# Patient Record
Sex: FEMALE | Race: WHITE | NOT HISPANIC OR LATINO | URBAN - METROPOLITAN AREA
[De-identification: names, ages, dates, MRNs, and addresses within clinical notes are randomized per-mention and may not be internally consistent; named-entity substitution may affect disease eponyms.]

---

## 2017-02-04 ENCOUNTER — INPATIENT (INPATIENT)
Facility: HOSPITAL | Age: 62
LOS: 5 days | Discharge: HOME | DRG: 536 | End: 2017-02-10
Attending: SURGERY | Admitting: SURGERY
Payer: COMMERCIAL

## 2017-02-04 VITALS — DIASTOLIC BLOOD PRESSURE: 73 MMHG | RESPIRATION RATE: 16 BRPM | HEART RATE: 90 BPM | SYSTOLIC BLOOD PRESSURE: 128 MMHG

## 2017-02-04 DIAGNOSIS — S32.501A UNSPECIFIED FRACTURE OF RIGHT PUBIS, INITIAL ENCOUNTER FOR CLOSED FRACTURE: ICD-10-CM

## 2017-02-04 LAB
ALBUMIN SERPL ELPH-MCNC: 3.8 G/DL — SIGNIFICANT CHANGE UP (ref 3.3–5)
ALP SERPL-CCNC: 88 U/L — SIGNIFICANT CHANGE UP (ref 40–120)
ALT FLD-CCNC: 24 U/L RC — SIGNIFICANT CHANGE UP (ref 10–45)
ANION GAP SERPL CALC-SCNC: 15 MMOL/L — SIGNIFICANT CHANGE UP (ref 5–17)
APPEARANCE UR: CLEAR — SIGNIFICANT CHANGE UP
APTT BLD: 29 SEC — SIGNIFICANT CHANGE UP (ref 27.5–37.4)
AST SERPL-CCNC: 28 U/L — SIGNIFICANT CHANGE UP (ref 10–40)
BACTERIA # UR AUTO: ABNORMAL /HPF
BASOPHILS # BLD AUTO: 0 K/UL — SIGNIFICANT CHANGE UP (ref 0–0.2)
BASOPHILS # BLD AUTO: 0.1 K/UL — SIGNIFICANT CHANGE UP (ref 0–0.2)
BASOPHILS NFR BLD AUTO: 0.2 % — SIGNIFICANT CHANGE UP (ref 0–2)
BASOPHILS NFR BLD AUTO: 0.5 % — SIGNIFICANT CHANGE UP (ref 0–2)
BILIRUB SERPL-MCNC: 0.4 MG/DL — SIGNIFICANT CHANGE UP (ref 0.2–1.2)
BILIRUB UR-MCNC: NEGATIVE — SIGNIFICANT CHANGE UP
BLD GP AB SCN SERPL QL: NEGATIVE — SIGNIFICANT CHANGE UP
BUN SERPL-MCNC: 19 MG/DL — SIGNIFICANT CHANGE UP (ref 7–23)
CALCIUM SERPL-MCNC: 9.3 MG/DL — SIGNIFICANT CHANGE UP (ref 8.4–10.5)
CHLORIDE SERPL-SCNC: 104 MMOL/L — SIGNIFICANT CHANGE UP (ref 96–108)
CO2 SERPL-SCNC: 23 MMOL/L — SIGNIFICANT CHANGE UP (ref 22–31)
COLOR SPEC: SIGNIFICANT CHANGE UP
CREAT SERPL-MCNC: 0.72 MG/DL — SIGNIFICANT CHANGE UP (ref 0.5–1.3)
DIFF PNL FLD: NEGATIVE — SIGNIFICANT CHANGE UP
EOSINOPHIL # BLD AUTO: 0 K/UL — SIGNIFICANT CHANGE UP (ref 0–0.5)
EOSINOPHIL # BLD AUTO: 0.1 K/UL — SIGNIFICANT CHANGE UP (ref 0–0.5)
EOSINOPHIL NFR BLD AUTO: 0.1 % — SIGNIFICANT CHANGE UP (ref 0–6)
EOSINOPHIL NFR BLD AUTO: 0.9 % — SIGNIFICANT CHANGE UP (ref 0–6)
EPI CELLS # UR: SIGNIFICANT CHANGE UP /HPF
GLUCOSE SERPL-MCNC: 115 MG/DL — HIGH (ref 70–99)
GLUCOSE UR QL: NEGATIVE — SIGNIFICANT CHANGE UP
HCT VFR BLD CALC: 40.2 % — SIGNIFICANT CHANGE UP (ref 34.5–45)
HCT VFR BLD CALC: 40.7 % — SIGNIFICANT CHANGE UP (ref 34.5–45)
HGB BLD-MCNC: 13.7 G/DL — SIGNIFICANT CHANGE UP (ref 11.5–15.5)
HGB BLD-MCNC: 13.8 G/DL — SIGNIFICANT CHANGE UP (ref 11.5–15.5)
INR BLD: 1.16 RATIO — SIGNIFICANT CHANGE UP (ref 0.88–1.16)
KETONES UR-MCNC: NEGATIVE — SIGNIFICANT CHANGE UP
LEUKOCYTE ESTERASE UR-ACNC: NEGATIVE — SIGNIFICANT CHANGE UP
LYMPHOCYTES # BLD AUTO: 15.5 % — SIGNIFICANT CHANGE UP (ref 13–44)
LYMPHOCYTES # BLD AUTO: 2 K/UL — SIGNIFICANT CHANGE UP (ref 1–3.3)
LYMPHOCYTES # BLD AUTO: 3.3 K/UL — SIGNIFICANT CHANGE UP (ref 1–3.3)
LYMPHOCYTES # BLD AUTO: 30.3 % — SIGNIFICANT CHANGE UP (ref 13–44)
MCHC RBC-ENTMCNC: 32.8 PG — SIGNIFICANT CHANGE UP (ref 27–34)
MCHC RBC-ENTMCNC: 32.8 PG — SIGNIFICANT CHANGE UP (ref 27–34)
MCHC RBC-ENTMCNC: 33.7 GM/DL — SIGNIFICANT CHANGE UP (ref 32–36)
MCHC RBC-ENTMCNC: 34.2 GM/DL — SIGNIFICANT CHANGE UP (ref 32–36)
MCV RBC AUTO: 95.8 FL — SIGNIFICANT CHANGE UP (ref 80–100)
MCV RBC AUTO: 97.3 FL — SIGNIFICANT CHANGE UP (ref 80–100)
MONOCYTES # BLD AUTO: 0.7 K/UL — SIGNIFICANT CHANGE UP (ref 0–0.9)
MONOCYTES # BLD AUTO: 0.7 K/UL — SIGNIFICANT CHANGE UP (ref 0–0.9)
MONOCYTES NFR BLD AUTO: 5.8 % — SIGNIFICANT CHANGE UP (ref 2–14)
MONOCYTES NFR BLD AUTO: 6.5 % — SIGNIFICANT CHANGE UP (ref 2–14)
NEUTROPHILS # BLD AUTO: 10 K/UL — HIGH (ref 1.8–7.4)
NEUTROPHILS # BLD AUTO: 6.8 K/UL — SIGNIFICANT CHANGE UP (ref 1.8–7.4)
NEUTROPHILS NFR BLD AUTO: 61.7 % — SIGNIFICANT CHANGE UP (ref 43–77)
NEUTROPHILS NFR BLD AUTO: 78.4 % — HIGH (ref 43–77)
NITRITE UR-MCNC: NEGATIVE — SIGNIFICANT CHANGE UP
PH UR: 6.5 — SIGNIFICANT CHANGE UP (ref 4.8–8)
PLATELET # BLD AUTO: 222 K/UL — SIGNIFICANT CHANGE UP (ref 150–400)
PLATELET # BLD AUTO: 263 K/UL — SIGNIFICANT CHANGE UP (ref 150–400)
POTASSIUM SERPL-MCNC: 4.3 MMOL/L — SIGNIFICANT CHANGE UP (ref 3.5–5.3)
POTASSIUM SERPL-SCNC: 4.3 MMOL/L — SIGNIFICANT CHANGE UP (ref 3.5–5.3)
PROT SERPL-MCNC: 6.6 G/DL — SIGNIFICANT CHANGE UP (ref 6–8.3)
PROT UR-MCNC: NEGATIVE — SIGNIFICANT CHANGE UP
PROTHROM AB SERPL-ACNC: 12.7 SEC — SIGNIFICANT CHANGE UP (ref 10–13.1)
RBC # BLD: 4.18 M/UL — SIGNIFICANT CHANGE UP (ref 3.8–5.2)
RBC # BLD: 4.2 M/UL — SIGNIFICANT CHANGE UP (ref 3.8–5.2)
RBC # FLD: 10.7 % — SIGNIFICANT CHANGE UP (ref 10.3–14.5)
RBC # FLD: 11.2 % — SIGNIFICANT CHANGE UP (ref 10.3–14.5)
RH IG SCN BLD-IMP: POSITIVE — SIGNIFICANT CHANGE UP
SODIUM SERPL-SCNC: 142 MMOL/L — SIGNIFICANT CHANGE UP (ref 135–145)
SP GR SPEC: 1.01 — SIGNIFICANT CHANGE UP (ref 1.01–1.02)
UROBILINOGEN FLD QL: NEGATIVE — SIGNIFICANT CHANGE UP
WBC # BLD: 10.9 K/UL — HIGH (ref 3.8–10.5)
WBC # BLD: 12.8 K/UL — HIGH (ref 3.8–10.5)
WBC # FLD AUTO: 10.9 K/UL — HIGH (ref 3.8–10.5)
WBC # FLD AUTO: 12.8 K/UL — HIGH (ref 3.8–10.5)
WBC UR QL: SIGNIFICANT CHANGE UP /HPF (ref 0–5)

## 2017-02-04 PROCEDURE — 73552 X-RAY EXAM OF FEMUR 2/>: CPT | Mod: 26,RT

## 2017-02-04 PROCEDURE — 71260 CT THORAX DX C+: CPT | Mod: 26

## 2017-02-04 PROCEDURE — 76377 3D RENDER W/INTRP POSTPROCES: CPT | Mod: 26

## 2017-02-04 PROCEDURE — 73502 X-RAY EXAM HIP UNI 2-3 VIEWS: CPT | Mod: 26,RT

## 2017-02-04 PROCEDURE — 71010: CPT | Mod: 26

## 2017-02-04 PROCEDURE — 99221 1ST HOSP IP/OBS SF/LOW 40: CPT | Mod: AI

## 2017-02-04 PROCEDURE — 72193 CT PELVIS W/DYE: CPT | Mod: 26,77

## 2017-02-04 PROCEDURE — 74177 CT ABD & PELVIS W/CONTRAST: CPT | Mod: 26,59

## 2017-02-04 PROCEDURE — 99285 EMERGENCY DEPT VISIT HI MDM: CPT

## 2017-02-04 PROCEDURE — 72193 CT PELVIS W/DYE: CPT | Mod: 26

## 2017-02-04 RX ORDER — SODIUM CHLORIDE 9 MG/ML
1000 INJECTION INTRAMUSCULAR; INTRAVENOUS; SUBCUTANEOUS ONCE
Qty: 0 | Refills: 0 | Status: COMPLETED | OUTPATIENT
Start: 2017-02-04 | End: 2017-02-04

## 2017-02-04 RX ORDER — ACETAMINOPHEN 500 MG
1000 TABLET ORAL ONCE
Qty: 0 | Refills: 0 | Status: COMPLETED | OUTPATIENT
Start: 2017-02-04 | End: 2017-02-04

## 2017-02-04 RX ORDER — SODIUM CHLORIDE 9 MG/ML
1000 INJECTION, SOLUTION INTRAVENOUS
Qty: 0 | Refills: 0 | Status: DISCONTINUED | OUTPATIENT
Start: 2017-02-04 | End: 2017-02-05

## 2017-02-04 RX ORDER — MORPHINE SULFATE 50 MG/1
4 CAPSULE, EXTENDED RELEASE ORAL ONCE
Qty: 0 | Refills: 0 | Status: DISCONTINUED | OUTPATIENT
Start: 2017-02-04 | End: 2017-02-04

## 2017-02-04 RX ORDER — OXYCODONE HYDROCHLORIDE 5 MG/1
5 TABLET ORAL EVERY 6 HOURS
Qty: 0 | Refills: 0 | Status: DISCONTINUED | OUTPATIENT
Start: 2017-02-04 | End: 2017-02-05

## 2017-02-04 RX ORDER — MORPHINE SULFATE 50 MG/1
2 CAPSULE, EXTENDED RELEASE ORAL ONCE
Qty: 0 | Refills: 0 | Status: DISCONTINUED | OUTPATIENT
Start: 2017-02-04 | End: 2017-02-04

## 2017-02-04 RX ORDER — ACETAMINOPHEN 500 MG
650 TABLET ORAL EVERY 6 HOURS
Qty: 0 | Refills: 0 | Status: DISCONTINUED | OUTPATIENT
Start: 2017-02-04 | End: 2017-02-05

## 2017-02-04 RX ADMIN — MORPHINE SULFATE 2 MILLIGRAM(S): 50 CAPSULE, EXTENDED RELEASE ORAL at 20:25

## 2017-02-04 RX ADMIN — MORPHINE SULFATE 4 MILLIGRAM(S): 50 CAPSULE, EXTENDED RELEASE ORAL at 23:37

## 2017-02-04 RX ADMIN — SODIUM CHLORIDE 1000 MILLILITER(S): 9 INJECTION INTRAMUSCULAR; INTRAVENOUS; SUBCUTANEOUS at 22:30

## 2017-02-04 RX ADMIN — Medication 1000 MILLIGRAM(S): at 18:53

## 2017-02-04 RX ADMIN — MORPHINE SULFATE 2 MILLIGRAM(S): 50 CAPSULE, EXTENDED RELEASE ORAL at 18:56

## 2017-02-04 RX ADMIN — MORPHINE SULFATE 2 MILLIGRAM(S): 50 CAPSULE, EXTENDED RELEASE ORAL at 21:00

## 2017-02-04 RX ADMIN — Medication 400 MILLIGRAM(S): at 17:55

## 2017-02-04 NOTE — ED PROVIDER NOTE - MUSCULOSKELETAL, MLM
No midline C, T, L ttp. No clavicular, chest wall, sternal ttp. Full ROM bilat UE with no joint or bony ttp. Full ROM LLE with no joint or bony ttp. RLE with limited R hip ROM 2/2 pain, but passive ROM intact, with R lateral hip ttp, no appreciable deformity. R knee with no ttp, full ROM, no ttp or deformity of ankle or foot.

## 2017-02-04 NOTE — H&P ADULT. - ATTENDING COMMENTS
Pt seen and examined. Agree with A/P. Admit to [facility], floor. NPO, IVF, serial HCT, pain control, hold lovenox.
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
## 2017-02-04 NOTE — ED ADULT NURSE REASSESSMENT NOTE - NS ED NURSE REASSESS COMMENT FT1
Patient complaining of pain at this time; MD dougherty made aware; will write for morphine; vitals stable; will continue to monitor; family at bedside
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
## 2017-02-04 NOTE — ED ADULT NURSE REASSESSMENT NOTE - NS ED NURSE REASSESS COMMENT FT1
0715 report received from aman jamison in [facility]; patient resting comfortably in stretcher; family at bedside; awaiting disposition; will reassess pain; a&ox3
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
## 2017-02-04 NOTE — ED ADULT NURSE REASSESSMENT NOTE - NS ED NURSE REASSESS COMMENT FT1
Assisted pt on female urinal. Hygienic and comfort needs provided. Tylenol IV administered as ordered, no adverse reaction noted.
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
## 2017-02-04 NOTE — ED PROCEDURE NOTE - PROCEDURE ADDITIONAL DETAILS
Emergency Department Focused Ultrasound performed at patient's bedside for educational purposes. The study will have a follow up study performed or was performed in the direct supervision of an ultrasound trained attending.       eFAST exam: no obvious intraperitoneal free fluid, lung sliding observed bilaterally. Confirmatory CT a/p and CXR pending
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
## 2017-02-04 NOTE — ED ADULT NURSE REASSESSMENT NOTE - NS ED NURSE REASSESS COMMENT FT1
Patient medicated as per order; patient awaiting transport upstairs; report called to shaheen brown 2monti

## 2017-02-04 NOTE — ED PROVIDER NOTE - PHYSICAL EXAMINATION
CN II-XII intact. Visual fields intact. EOMI, PERRLA. Facial sensation equal bilat. Smile and eye closure equal bilat. Hearing intact bilat. Palate elevation equal, tongue protrusion midline. Lateral head rotation equal bilat. 5/5 strength bilat UE and LLE. assessment RLE limited 2/2 pain. No pronator drift.  2+ rad and dp pulses bilat. sensation grossly intact bilat.
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
## 2017-02-05 LAB
ANION GAP SERPL CALC-SCNC: 10 MMOL/L — SIGNIFICANT CHANGE UP (ref 5–17)
APTT BLD: 29.2 SEC — SIGNIFICANT CHANGE UP (ref 27.5–37.4)
BUN SERPL-MCNC: 12 MG/DL — SIGNIFICANT CHANGE UP (ref 7–23)
CALCIUM SERPL-MCNC: 9.3 MG/DL — SIGNIFICANT CHANGE UP (ref 8.4–10.5)
CHLORIDE SERPL-SCNC: 104 MMOL/L — SIGNIFICANT CHANGE UP (ref 96–108)
CO2 SERPL-SCNC: 24 MMOL/L — SIGNIFICANT CHANGE UP (ref 22–31)
CREAT SERPL-MCNC: 0.74 MG/DL — SIGNIFICANT CHANGE UP (ref 0.5–1.3)
GLUCOSE SERPL-MCNC: 105 MG/DL — HIGH (ref 70–99)
HCT VFR BLD CALC: 38.2 % — SIGNIFICANT CHANGE UP (ref 34.5–45)
HGB BLD-MCNC: 12.4 G/DL — SIGNIFICANT CHANGE UP (ref 11.5–15.5)
INR BLD: 1.1 RATIO — SIGNIFICANT CHANGE UP (ref 0.88–1.16)
MCHC RBC-ENTMCNC: 31.9 PG — SIGNIFICANT CHANGE UP (ref 27–34)
MCHC RBC-ENTMCNC: 32.5 GM/DL — SIGNIFICANT CHANGE UP (ref 32–36)
MCV RBC AUTO: 98.2 FL — SIGNIFICANT CHANGE UP (ref 80–100)
PLATELET # BLD AUTO: 244 K/UL — SIGNIFICANT CHANGE UP (ref 150–400)
POTASSIUM SERPL-MCNC: 4.4 MMOL/L — SIGNIFICANT CHANGE UP (ref 3.5–5.3)
POTASSIUM SERPL-SCNC: 4.4 MMOL/L — SIGNIFICANT CHANGE UP (ref 3.5–5.3)
PROTHROM AB SERPL-ACNC: 12.4 SEC — SIGNIFICANT CHANGE UP (ref 10–13.1)
RBC # BLD: 3.89 M/UL — SIGNIFICANT CHANGE UP (ref 3.8–5.2)
RBC # FLD: 12.1 % — SIGNIFICANT CHANGE UP (ref 10.3–14.5)
SODIUM SERPL-SCNC: 138 MMOL/L — SIGNIFICANT CHANGE UP (ref 135–145)
WBC # BLD: 9.67 K/UL — SIGNIFICANT CHANGE UP (ref 3.8–10.5)
WBC # FLD AUTO: 9.67 K/UL — SIGNIFICANT CHANGE UP (ref 3.8–10.5)

## 2017-02-05 PROCEDURE — 99232 SBSQ HOSP IP/OBS MODERATE 35: CPT

## 2017-02-05 RX ORDER — OXYCODONE HYDROCHLORIDE 5 MG/1
10 TABLET ORAL EVERY 6 HOURS
Qty: 0 | Refills: 0 | Status: DISCONTINUED | OUTPATIENT
Start: 2017-02-05 | End: 2017-02-07

## 2017-02-05 RX ORDER — ACETAMINOPHEN 500 MG
1000 TABLET ORAL ONCE
Qty: 0 | Refills: 0 | Status: COMPLETED | OUTPATIENT
Start: 2017-02-05 | End: 2017-02-05

## 2017-02-05 RX ORDER — MORPHINE SULFATE 50 MG/1
2 CAPSULE, EXTENDED RELEASE ORAL
Qty: 0 | Refills: 0 | Status: DISCONTINUED | OUTPATIENT
Start: 2017-02-05 | End: 2017-02-07

## 2017-02-05 RX ORDER — DOCUSATE SODIUM 100 MG
100 CAPSULE ORAL THREE TIMES A DAY
Qty: 0 | Refills: 0 | Status: DISCONTINUED | OUTPATIENT
Start: 2017-02-05 | End: 2017-02-10

## 2017-02-05 RX ORDER — OXYCODONE HYDROCHLORIDE 5 MG/1
5 TABLET ORAL EVERY 6 HOURS
Qty: 0 | Refills: 0 | Status: DISCONTINUED | OUTPATIENT
Start: 2017-02-05 | End: 2017-02-06

## 2017-02-05 RX ORDER — SENNA PLUS 8.6 MG/1
2 TABLET ORAL AT BEDTIME
Qty: 0 | Refills: 0 | Status: DISCONTINUED | OUTPATIENT
Start: 2017-02-05 | End: 2017-02-10

## 2017-02-05 RX ORDER — ACETAMINOPHEN 500 MG
1000 TABLET ORAL ONCE
Qty: 0 | Refills: 0 | Status: COMPLETED | OUTPATIENT
Start: 2017-02-06 | End: 2017-02-06

## 2017-02-05 RX ORDER — ENOXAPARIN SODIUM 100 MG/ML
40 INJECTION SUBCUTANEOUS DAILY
Qty: 0 | Refills: 0 | Status: DISCONTINUED | OUTPATIENT
Start: 2017-02-05 | End: 2017-02-10

## 2017-02-05 RX ORDER — OXYCODONE HYDROCHLORIDE 5 MG/1
5 TABLET ORAL EVERY 6 HOURS
Qty: 0 | Refills: 0 | Status: DISCONTINUED | OUTPATIENT
Start: 2017-02-05 | End: 2017-02-05

## 2017-02-05 RX ADMIN — SODIUM CHLORIDE 30 MILLILITER(S): 9 INJECTION, SOLUTION INTRAVENOUS at 01:44

## 2017-02-05 RX ADMIN — Medication 400 MILLIGRAM(S): at 23:10

## 2017-02-05 RX ADMIN — Medication 1000 MILLIGRAM(S): at 19:30

## 2017-02-05 RX ADMIN — Medication 650 MILLIGRAM(S): at 01:44

## 2017-02-05 RX ADMIN — OXYCODONE HYDROCHLORIDE 5 MILLIGRAM(S): 5 TABLET ORAL at 12:11

## 2017-02-05 RX ADMIN — Medication 650 MILLIGRAM(S): at 12:11

## 2017-02-05 RX ADMIN — Medication 400 MILLIGRAM(S): at 19:15

## 2017-02-05 RX ADMIN — OXYCODONE HYDROCHLORIDE 5 MILLIGRAM(S): 5 TABLET ORAL at 12:41

## 2017-02-05 RX ADMIN — SENNA PLUS 2 TABLET(S): 8.6 TABLET ORAL at 23:10

## 2017-02-05 RX ADMIN — Medication 100 MILLIGRAM(S): at 23:10

## 2017-02-05 RX ADMIN — MORPHINE SULFATE 4 MILLIGRAM(S): 50 CAPSULE, EXTENDED RELEASE ORAL at 02:21

## 2017-02-05 RX ADMIN — Medication 1000 MILLIGRAM(S): at 23:25

## 2017-02-05 RX ADMIN — ENOXAPARIN SODIUM 40 MILLIGRAM(S): 100 INJECTION SUBCUTANEOUS at 12:12

## 2017-02-05 RX ADMIN — Medication 650 MILLIGRAM(S): at 06:20

## 2017-02-05 NOTE — PATIENT PROFILE ADULT. - VISION (WITH CORRECTIVE LENSES IF THE PATIENT USUALLY WEARS THEM):
Normal vision: sees adequately in most situations; can see medication labels, newsprint/prescription glasses

## 2017-02-06 PROCEDURE — 99232 SBSQ HOSP IP/OBS MODERATE 35: CPT

## 2017-02-06 RX ORDER — ACETAMINOPHEN 500 MG
1000 TABLET ORAL ONCE
Qty: 0 | Refills: 0 | Status: COMPLETED | OUTPATIENT
Start: 2017-02-06 | End: 2017-02-06

## 2017-02-06 RX ORDER — ONDANSETRON 8 MG/1
4 TABLET, FILM COATED ORAL EVERY 6 HOURS
Qty: 0 | Refills: 0 | Status: DISCONTINUED | OUTPATIENT
Start: 2017-02-06 | End: 2017-02-10

## 2017-02-06 RX ORDER — IBUPROFEN 200 MG
400 TABLET ORAL THREE TIMES A DAY
Qty: 0 | Refills: 0 | Status: DISCONTINUED | OUTPATIENT
Start: 2017-02-06 | End: 2017-02-10

## 2017-02-06 RX ORDER — ACETAMINOPHEN 500 MG
650 TABLET ORAL EVERY 6 HOURS
Qty: 0 | Refills: 0 | Status: DISCONTINUED | OUTPATIENT
Start: 2017-02-06 | End: 2017-02-10

## 2017-02-06 RX ORDER — OXYCODONE HYDROCHLORIDE 5 MG/1
5 TABLET ORAL EVERY 4 HOURS
Qty: 0 | Refills: 0 | Status: DISCONTINUED | OUTPATIENT
Start: 2017-02-06 | End: 2017-02-10

## 2017-02-06 RX ORDER — POLYETHYLENE GLYCOL 3350 17 G/17G
17 POWDER, FOR SOLUTION ORAL ONCE
Qty: 0 | Refills: 0 | Status: COMPLETED | OUTPATIENT
Start: 2017-02-06 | End: 2017-02-07

## 2017-02-06 RX ADMIN — Medication 400 MILLIGRAM(S): at 21:43

## 2017-02-06 RX ADMIN — MORPHINE SULFATE 2 MILLIGRAM(S): 50 CAPSULE, EXTENDED RELEASE ORAL at 10:51

## 2017-02-06 RX ADMIN — Medication 650 MILLIGRAM(S): at 17:38

## 2017-02-06 RX ADMIN — Medication 100 MILLIGRAM(S): at 12:39

## 2017-02-06 RX ADMIN — Medication 1000 MILLIGRAM(S): at 12:57

## 2017-02-06 RX ADMIN — OXYCODONE HYDROCHLORIDE 5 MILLIGRAM(S): 5 TABLET ORAL at 19:14

## 2017-02-06 RX ADMIN — Medication 1000 MILLIGRAM(S): at 06:36

## 2017-02-06 RX ADMIN — OXYCODONE HYDROCHLORIDE 5 MILLIGRAM(S): 5 TABLET ORAL at 18:44

## 2017-02-06 RX ADMIN — Medication 100 MILLIGRAM(S): at 21:12

## 2017-02-06 RX ADMIN — MORPHINE SULFATE 2 MILLIGRAM(S): 50 CAPSULE, EXTENDED RELEASE ORAL at 09:36

## 2017-02-06 RX ADMIN — Medication 100 MILLIGRAM(S): at 06:36

## 2017-02-06 RX ADMIN — Medication 400 MILLIGRAM(S): at 21:13

## 2017-02-06 RX ADMIN — Medication 650 MILLIGRAM(S): at 23:09

## 2017-02-06 RX ADMIN — SENNA PLUS 2 TABLET(S): 8.6 TABLET ORAL at 21:13

## 2017-02-06 RX ADMIN — ONDANSETRON 4 MILLIGRAM(S): 8 TABLET, FILM COATED ORAL at 17:08

## 2017-02-06 RX ADMIN — Medication 400 MILLIGRAM(S): at 12:42

## 2017-02-06 RX ADMIN — OXYCODONE HYDROCHLORIDE 10 MILLIGRAM(S): 5 TABLET ORAL at 23:39

## 2017-02-06 RX ADMIN — Medication 650 MILLIGRAM(S): at 17:08

## 2017-02-06 RX ADMIN — OXYCODONE HYDROCHLORIDE 10 MILLIGRAM(S): 5 TABLET ORAL at 23:09

## 2017-02-06 RX ADMIN — Medication 400 MILLIGRAM(S): at 06:35

## 2017-02-06 RX ADMIN — Medication 650 MILLIGRAM(S): at 23:39

## 2017-02-06 RX ADMIN — ENOXAPARIN SODIUM 40 MILLIGRAM(S): 100 INJECTION SUBCUTANEOUS at 12:39

## 2017-02-06 NOTE — PHYSICAL THERAPY INITIAL EVALUATION ADULT - DIAGNOSIS, PT EVAL
Decr. functional mobility due to decr. dynamic balance, postural control, R LE strength; gait dysfunction
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
## 2017-02-06 NOTE — PHYSICAL THERAPY INITIAL EVALUATION ADULT - ADDITIONAL COMMENTS
Pt lives with  in private home with 3-4 stairs to enter (B HR, spread apart), full flight to bedroom (1 HR), first floor setup available if necessary. Pt owns walker, w/c, rollator. Pt states  works during the day and is unable to provide pt with assist.

## 2017-02-06 NOTE — PHYSICAL THERAPY INITIAL EVALUATION ADULT - GENERAL OBSERVATIONS, REHAB EVAL
Pt received semi-supine in bed, (+) B venodyne sleeves, NAD. Pt's sister at bedside. Pt alert, c/o R hip pain, agreeable to PT eval.

## 2017-02-06 NOTE — PHYSICAL THERAPY INITIAL EVALUATION ADULT - GAIT DEVIATIONS NOTED, PT EVAL
decreased weight-shifting ability/decreased step length/decreased derek/increased time in double stance

## 2017-02-06 NOTE — PHYSICAL THERAPY INITIAL EVALUATION ADULT - PLANNED THERAPY INTERVENTIONS, PT EVAL
bed mobility training/balance training/postural re-education/transfer training/gait training/strengthening

## 2017-02-07 PROCEDURE — 99222 1ST HOSP IP/OBS MODERATE 55: CPT | Mod: GC

## 2017-02-07 RX ORDER — OXYCODONE HYDROCHLORIDE 5 MG/1
5 TABLET ORAL ONCE
Qty: 0 | Refills: 0 | Status: DISCONTINUED | OUTPATIENT
Start: 2017-02-07 | End: 2017-02-07

## 2017-02-07 RX ORDER — OXYCODONE HYDROCHLORIDE 5 MG/1
10 TABLET ORAL EVERY 4 HOURS
Qty: 0 | Refills: 0 | Status: DISCONTINUED | OUTPATIENT
Start: 2017-02-07 | End: 2017-02-10

## 2017-02-07 RX ORDER — TRAMADOL HYDROCHLORIDE 50 MG/1
25 TABLET ORAL EVERY 8 HOURS
Qty: 0 | Refills: 0 | Status: DISCONTINUED | OUTPATIENT
Start: 2017-02-07 | End: 2017-02-08

## 2017-02-07 RX ORDER — LIDOCAINE 4 G/100G
1 CREAM TOPICAL DAILY
Qty: 0 | Refills: 0 | Status: DISCONTINUED | OUTPATIENT
Start: 2017-02-07 | End: 2017-02-10

## 2017-02-07 RX ADMIN — Medication 650 MILLIGRAM(S): at 06:00

## 2017-02-07 RX ADMIN — Medication 400 MILLIGRAM(S): at 23:00

## 2017-02-07 RX ADMIN — ONDANSETRON 4 MILLIGRAM(S): 8 TABLET, FILM COATED ORAL at 15:35

## 2017-02-07 RX ADMIN — Medication 100 MILLIGRAM(S): at 05:25

## 2017-02-07 RX ADMIN — TRAMADOL HYDROCHLORIDE 25 MILLIGRAM(S): 50 TABLET ORAL at 19:00

## 2017-02-07 RX ADMIN — OXYCODONE HYDROCHLORIDE 10 MILLIGRAM(S): 5 TABLET ORAL at 20:40

## 2017-02-07 RX ADMIN — LIDOCAINE 1 PATCH: 4 CREAM TOPICAL at 15:34

## 2017-02-07 RX ADMIN — POLYETHYLENE GLYCOL 3350 17 GRAM(S): 17 POWDER, FOR SOLUTION ORAL at 05:25

## 2017-02-07 RX ADMIN — Medication 100 MILLIGRAM(S): at 13:48

## 2017-02-07 RX ADMIN — TRAMADOL HYDROCHLORIDE 25 MILLIGRAM(S): 50 TABLET ORAL at 18:42

## 2017-02-07 RX ADMIN — Medication 100 MILLIGRAM(S): at 22:27

## 2017-02-07 RX ADMIN — Medication 400 MILLIGRAM(S): at 05:25

## 2017-02-07 RX ADMIN — OXYCODONE HYDROCHLORIDE 5 MILLIGRAM(S): 5 TABLET ORAL at 08:35

## 2017-02-07 RX ADMIN — OXYCODONE HYDROCHLORIDE 10 MILLIGRAM(S): 5 TABLET ORAL at 20:19

## 2017-02-07 RX ADMIN — Medication 400 MILLIGRAM(S): at 06:00

## 2017-02-07 RX ADMIN — Medication 400 MILLIGRAM(S): at 13:44

## 2017-02-07 RX ADMIN — Medication 400 MILLIGRAM(S): at 22:27

## 2017-02-07 RX ADMIN — Medication 650 MILLIGRAM(S): at 05:25

## 2017-02-07 RX ADMIN — OXYCODONE HYDROCHLORIDE 5 MILLIGRAM(S): 5 TABLET ORAL at 08:05

## 2017-02-07 RX ADMIN — ENOXAPARIN SODIUM 40 MILLIGRAM(S): 100 INJECTION SUBCUTANEOUS at 13:47

## 2017-02-07 RX ADMIN — OXYCODONE HYDROCHLORIDE 5 MILLIGRAM(S): 5 TABLET ORAL at 09:25

## 2017-02-07 RX ADMIN — SENNA PLUS 2 TABLET(S): 8.6 TABLET ORAL at 22:27

## 2017-02-07 RX ADMIN — Medication 400 MILLIGRAM(S): at 14:14

## 2017-02-07 RX ADMIN — OXYCODONE HYDROCHLORIDE 5 MILLIGRAM(S): 5 TABLET ORAL at 08:55

## 2017-02-08 RX ORDER — POLYETHYLENE GLYCOL 3350 17 G/17G
17 POWDER, FOR SOLUTION ORAL DAILY
Qty: 0 | Refills: 0 | Status: DISCONTINUED | OUTPATIENT
Start: 2017-02-08 | End: 2017-02-10

## 2017-02-08 RX ORDER — TRAMADOL HYDROCHLORIDE 50 MG/1
50 TABLET ORAL EVERY 8 HOURS
Qty: 0 | Refills: 0 | Status: DISCONTINUED | OUTPATIENT
Start: 2017-02-08 | End: 2017-02-10

## 2017-02-08 RX ADMIN — LIDOCAINE 1 PATCH: 4 CREAM TOPICAL at 13:31

## 2017-02-08 RX ADMIN — Medication 400 MILLIGRAM(S): at 14:07

## 2017-02-08 RX ADMIN — Medication 400 MILLIGRAM(S): at 13:27

## 2017-02-08 RX ADMIN — Medication 100 MILLIGRAM(S): at 13:27

## 2017-02-08 RX ADMIN — Medication 650 MILLIGRAM(S): at 19:32

## 2017-02-08 RX ADMIN — Medication 650 MILLIGRAM(S): at 14:06

## 2017-02-08 RX ADMIN — TRAMADOL HYDROCHLORIDE 50 MILLIGRAM(S): 50 TABLET ORAL at 12:06

## 2017-02-08 RX ADMIN — OXYCODONE HYDROCHLORIDE 10 MILLIGRAM(S): 5 TABLET ORAL at 09:40

## 2017-02-08 RX ADMIN — ENOXAPARIN SODIUM 40 MILLIGRAM(S): 100 INJECTION SUBCUTANEOUS at 13:31

## 2017-02-08 RX ADMIN — OXYCODONE HYDROCHLORIDE 5 MILLIGRAM(S): 5 TABLET ORAL at 15:58

## 2017-02-08 RX ADMIN — POLYETHYLENE GLYCOL 3350 17 GRAM(S): 17 POWDER, FOR SOLUTION ORAL at 13:26

## 2017-02-08 RX ADMIN — SENNA PLUS 2 TABLET(S): 8.6 TABLET ORAL at 21:00

## 2017-02-08 RX ADMIN — Medication 650 MILLIGRAM(S): at 13:26

## 2017-02-08 RX ADMIN — OXYCODONE HYDROCHLORIDE 10 MILLIGRAM(S): 5 TABLET ORAL at 20:57

## 2017-02-08 RX ADMIN — LIDOCAINE 1 PATCH: 4 CREAM TOPICAL at 03:20

## 2017-02-08 RX ADMIN — Medication 400 MILLIGRAM(S): at 06:23

## 2017-02-08 RX ADMIN — OXYCODONE HYDROCHLORIDE 10 MILLIGRAM(S): 5 TABLET ORAL at 21:30

## 2017-02-08 RX ADMIN — TRAMADOL HYDROCHLORIDE 50 MILLIGRAM(S): 50 TABLET ORAL at 11:36

## 2017-02-08 RX ADMIN — Medication 100 MILLIGRAM(S): at 21:00

## 2017-02-08 RX ADMIN — OXYCODONE HYDROCHLORIDE 5 MILLIGRAM(S): 5 TABLET ORAL at 16:28

## 2017-02-08 RX ADMIN — OXYCODONE HYDROCHLORIDE 10 MILLIGRAM(S): 5 TABLET ORAL at 10:10

## 2017-02-08 RX ADMIN — Medication 650 MILLIGRAM(S): at 19:00

## 2017-02-08 RX ADMIN — Medication 400 MILLIGRAM(S): at 07:00

## 2017-02-08 RX ADMIN — Medication 100 MILLIGRAM(S): at 06:23
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
## 2017-02-08 NOTE — OCCUPATIONAL THERAPY INITIAL EVALUATION ADULT - ADDITIONAL COMMENTS
CT Pelvis Comminuted and mildly displaced fracture of the right superior pubic ramus, minimally displaced fracture of the right inferior pubic ramus and nondisplaced fracture of the right sacrum, noted on the earlier CT of the same date. Mild asymmetric enlargement of the right hip adductor muscles, indicating muscle edema/hematoma. No contrast extravasation to suggest active bleeding.

## 2017-02-08 NOTE — OCCUPATIONAL THERAPY INITIAL EVALUATION ADULT - DIAGNOSIS, OT EVAL
Decreased strength, balance, endurance and increased pain impacting ability to perform ADLs and functional mobility

## 2017-02-09 RX ORDER — DIPHENHYDRAMINE HCL 50 MG
25 CAPSULE ORAL ONCE
Qty: 0 | Refills: 0 | Status: COMPLETED | OUTPATIENT
Start: 2017-02-09 | End: 2017-02-09

## 2017-02-09 RX ADMIN — TRAMADOL HYDROCHLORIDE 50 MILLIGRAM(S): 50 TABLET ORAL at 16:58

## 2017-02-09 RX ADMIN — OXYCODONE HYDROCHLORIDE 5 MILLIGRAM(S): 5 TABLET ORAL at 21:00

## 2017-02-09 RX ADMIN — Medication 100 MILLIGRAM(S): at 05:16

## 2017-02-09 RX ADMIN — SENNA PLUS 2 TABLET(S): 8.6 TABLET ORAL at 21:16

## 2017-02-09 RX ADMIN — OXYCODONE HYDROCHLORIDE 10 MILLIGRAM(S): 5 TABLET ORAL at 09:21

## 2017-02-09 RX ADMIN — Medication 400 MILLIGRAM(S): at 05:17

## 2017-02-09 RX ADMIN — Medication 25 MILLIGRAM(S): at 18:52

## 2017-02-09 RX ADMIN — OXYCODONE HYDROCHLORIDE 5 MILLIGRAM(S): 5 TABLET ORAL at 20:35

## 2017-02-09 RX ADMIN — Medication 650 MILLIGRAM(S): at 18:52

## 2017-02-09 RX ADMIN — Medication 400 MILLIGRAM(S): at 12:52

## 2017-02-09 RX ADMIN — Medication 100 MILLIGRAM(S): at 12:54

## 2017-02-09 RX ADMIN — Medication 400 MILLIGRAM(S): at 13:22

## 2017-02-09 RX ADMIN — LIDOCAINE 1 PATCH: 4 CREAM TOPICAL at 11:07

## 2017-02-09 RX ADMIN — TRAMADOL HYDROCHLORIDE 50 MILLIGRAM(S): 50 TABLET ORAL at 08:45

## 2017-02-09 RX ADMIN — LIDOCAINE 1 PATCH: 4 CREAM TOPICAL at 01:00

## 2017-02-09 RX ADMIN — Medication 650 MILLIGRAM(S): at 11:06

## 2017-02-09 RX ADMIN — OXYCODONE HYDROCHLORIDE 10 MILLIGRAM(S): 5 TABLET ORAL at 09:51

## 2017-02-09 RX ADMIN — Medication 400 MILLIGRAM(S): at 21:15

## 2017-02-09 RX ADMIN — POLYETHYLENE GLYCOL 3350 17 GRAM(S): 17 POWDER, FOR SOLUTION ORAL at 12:52

## 2017-02-09 RX ADMIN — Medication 650 MILLIGRAM(S): at 11:36

## 2017-02-09 RX ADMIN — Medication 400 MILLIGRAM(S): at 06:00

## 2017-02-09 RX ADMIN — Medication 650 MILLIGRAM(S): at 05:17

## 2017-02-09 RX ADMIN — Medication 650 MILLIGRAM(S): at 19:22

## 2017-02-09 RX ADMIN — Medication 100 MILLIGRAM(S): at 21:15

## 2017-02-09 RX ADMIN — Medication 650 MILLIGRAM(S): at 06:00

## 2017-02-09 RX ADMIN — TRAMADOL HYDROCHLORIDE 50 MILLIGRAM(S): 50 TABLET ORAL at 16:28

## 2017-02-09 RX ADMIN — TRAMADOL HYDROCHLORIDE 50 MILLIGRAM(S): 50 TABLET ORAL at 08:15

## 2017-02-09 RX ADMIN — ENOXAPARIN SODIUM 40 MILLIGRAM(S): 100 INJECTION SUBCUTANEOUS at 11:07

## 2017-02-09 RX ADMIN — Medication 650 MILLIGRAM(S): at 01:00

## 2017-02-09 RX ADMIN — Medication 400 MILLIGRAM(S): at 22:00

## 2017-02-09 RX ADMIN — Medication 650 MILLIGRAM(S): at 00:24

## 2017-02-10 ENCOUNTER — TRANSCRIPTION ENCOUNTER (OUTPATIENT)
Age: 62
End: 2017-02-10

## 2017-02-10 ENCOUNTER — INPATIENT (INPATIENT)
Facility: HOSPITAL | Age: 62
LOS: 12 days | Discharge: HOME HEALTH | DRG: 950 | End: 2017-02-23
Attending: STUDENT IN AN ORGANIZED HEALTH CARE EDUCATION/TRAINING PROGRAM | Admitting: STUDENT IN AN ORGANIZED HEALTH CARE EDUCATION/TRAINING PROGRAM
Payer: COMMERCIAL

## 2017-02-10 VITALS
OXYGEN SATURATION: 97 % | TEMPERATURE: 98 F | DIASTOLIC BLOOD PRESSURE: 81 MMHG | HEART RATE: 89 BPM | SYSTOLIC BLOOD PRESSURE: 122 MMHG | RESPIRATION RATE: 18 BRPM

## 2017-02-10 DIAGNOSIS — R26.9 UNSPECIFIED ABNORMALITIES OF GAIT AND MOBILITY: ICD-10-CM

## 2017-02-10 DIAGNOSIS — R11.0 NAUSEA: ICD-10-CM

## 2017-02-10 DIAGNOSIS — M25.562 PAIN IN LEFT KNEE: ICD-10-CM

## 2017-02-10 DIAGNOSIS — S32.9XXA FRACTURE OF UNSPECIFIED PARTS OF LUMBOSACRAL SPINE AND PELVIS, INITIAL ENCOUNTER FOR CLOSED FRACTURE: ICD-10-CM

## 2017-02-10 DIAGNOSIS — L23.9 ALLERGIC CONTACT DERMATITIS, UNSPECIFIED CAUSE: ICD-10-CM

## 2017-02-10 DIAGNOSIS — E55.9 VITAMIN D DEFICIENCY, UNSPECIFIED: ICD-10-CM

## 2017-02-10 DIAGNOSIS — G47.00 INSOMNIA, UNSPECIFIED: ICD-10-CM

## 2017-02-10 DIAGNOSIS — Z51.89 ENCOUNTER FOR OTHER SPECIFIED AFTERCARE: ICD-10-CM

## 2017-02-10 DIAGNOSIS — S32.511D FRACTURE OF SUPERIOR RIM OF RIGHT PUBIS, SUBSEQUENT ENCOUNTER FOR FRACTURE WITH ROUTINE HEALING: ICD-10-CM

## 2017-02-10 DIAGNOSIS — K59.00 CONSTIPATION, UNSPECIFIED: ICD-10-CM

## 2017-02-10 PROCEDURE — 96376 TX/PRO/DX INJ SAME DRUG ADON: CPT | Mod: XU

## 2017-02-10 PROCEDURE — 86900 BLOOD TYPING SEROLOGIC ABO: CPT

## 2017-02-10 PROCEDURE — 93005 ELECTROCARDIOGRAM TRACING: CPT

## 2017-02-10 PROCEDURE — 85027 COMPLETE CBC AUTOMATED: CPT

## 2017-02-10 PROCEDURE — 71045 X-RAY EXAM CHEST 1 VIEW: CPT

## 2017-02-10 PROCEDURE — 86901 BLOOD TYPING SEROLOGIC RH(D): CPT

## 2017-02-10 PROCEDURE — 80053 COMPREHEN METABOLIC PANEL: CPT

## 2017-02-10 PROCEDURE — 72192 CT PELVIS W/O DYE: CPT

## 2017-02-10 PROCEDURE — 97165 OT EVAL LOW COMPLEX 30 MIN: CPT

## 2017-02-10 PROCEDURE — 85610 PROTHROMBIN TIME: CPT

## 2017-02-10 PROCEDURE — 73552 X-RAY EXAM OF FEMUR 2/>: CPT

## 2017-02-10 PROCEDURE — 97530 THERAPEUTIC ACTIVITIES: CPT

## 2017-02-10 PROCEDURE — 71260 CT THORAX DX C+: CPT

## 2017-02-10 PROCEDURE — 99285 EMERGENCY DEPT VISIT HI MDM: CPT | Mod: 25

## 2017-02-10 PROCEDURE — 97162 PT EVAL MOD COMPLEX 30 MIN: CPT

## 2017-02-10 PROCEDURE — 76377 3D RENDER W/INTRP POSTPROCES: CPT

## 2017-02-10 PROCEDURE — 97110 THERAPEUTIC EXERCISES: CPT

## 2017-02-10 PROCEDURE — 72193 CT PELVIS W/DYE: CPT

## 2017-02-10 PROCEDURE — 99222 1ST HOSP IP/OBS MODERATE 55: CPT

## 2017-02-10 PROCEDURE — 86850 RBC ANTIBODY SCREEN: CPT

## 2017-02-10 PROCEDURE — 74177 CT ABD & PELVIS W/CONTRAST: CPT

## 2017-02-10 PROCEDURE — 96374 THER/PROPH/DIAG INJ IV PUSH: CPT | Mod: XU

## 2017-02-10 PROCEDURE — 85730 THROMBOPLASTIN TIME PARTIAL: CPT

## 2017-02-10 PROCEDURE — 80048 BASIC METABOLIC PNL TOTAL CA: CPT

## 2017-02-10 PROCEDURE — 81001 URINALYSIS AUTO W/SCOPE: CPT

## 2017-02-10 PROCEDURE — 73502 X-RAY EXAM HIP UNI 2-3 VIEWS: CPT

## 2017-02-10 PROCEDURE — 96375 TX/PRO/DX INJ NEW DRUG ADDON: CPT | Mod: XU

## 2017-02-10 RX ORDER — ENOXAPARIN SODIUM 100 MG/ML
40 INJECTION SUBCUTANEOUS
Qty: 0 | Refills: 0 | COMMUNITY
Start: 2017-02-10

## 2017-02-10 RX ORDER — LIDOCAINE 4 G/100G
1 CREAM TOPICAL
Qty: 0 | Refills: 0 | COMMUNITY
Start: 2017-02-10

## 2017-02-10 RX ORDER — SENNA PLUS 8.6 MG/1
2 TABLET ORAL
Qty: 0 | Refills: 0 | COMMUNITY
Start: 2017-02-10

## 2017-02-10 RX ORDER — IBUPROFEN 200 MG
1 TABLET ORAL
Qty: 0 | Refills: 0 | COMMUNITY
Start: 2017-02-10

## 2017-02-10 RX ORDER — POLYETHYLENE GLYCOL 3350 17 G/17G
17 POWDER, FOR SOLUTION ORAL
Qty: 0 | Refills: 0 | COMMUNITY
Start: 2017-02-10

## 2017-02-10 RX ORDER — TRAMADOL HYDROCHLORIDE 50 MG/1
1 TABLET ORAL
Qty: 0 | Refills: 0 | COMMUNITY
Start: 2017-02-10

## 2017-02-10 RX ORDER — DOCUSATE SODIUM 100 MG
1 CAPSULE ORAL
Qty: 0 | Refills: 0 | COMMUNITY
Start: 2017-02-10

## 2017-02-10 RX ORDER — ACETAMINOPHEN 500 MG
2 TABLET ORAL
Qty: 0 | Refills: 0 | COMMUNITY
Start: 2017-02-10

## 2017-02-10 RX ADMIN — Medication 650 MILLIGRAM(S): at 06:00

## 2017-02-10 RX ADMIN — Medication 650 MILLIGRAM(S): at 11:38

## 2017-02-10 RX ADMIN — Medication 400 MILLIGRAM(S): at 06:00

## 2017-02-10 RX ADMIN — Medication 650 MILLIGRAM(S): at 11:39

## 2017-02-10 RX ADMIN — OXYCODONE HYDROCHLORIDE 5 MILLIGRAM(S): 5 TABLET ORAL at 14:00

## 2017-02-10 RX ADMIN — Medication 650 MILLIGRAM(S): at 01:05

## 2017-02-10 RX ADMIN — OXYCODONE HYDROCHLORIDE 5 MILLIGRAM(S): 5 TABLET ORAL at 10:09

## 2017-02-10 RX ADMIN — Medication 650 MILLIGRAM(S): at 05:09

## 2017-02-10 RX ADMIN — TRAMADOL HYDROCHLORIDE 50 MILLIGRAM(S): 50 TABLET ORAL at 11:39

## 2017-02-10 RX ADMIN — LIDOCAINE 1 PATCH: 4 CREAM TOPICAL at 00:36

## 2017-02-10 RX ADMIN — Medication 400 MILLIGRAM(S): at 05:09

## 2017-02-10 RX ADMIN — POLYETHYLENE GLYCOL 3350 17 GRAM(S): 17 POWDER, FOR SOLUTION ORAL at 11:42

## 2017-02-10 RX ADMIN — Medication 650 MILLIGRAM(S): at 00:35

## 2017-02-10 RX ADMIN — Medication 100 MILLIGRAM(S): at 05:09

## 2017-02-10 RX ADMIN — ENOXAPARIN SODIUM 40 MILLIGRAM(S): 100 INJECTION SUBCUTANEOUS at 11:38

## 2017-02-10 NOTE — DISCHARGE NOTE ADULT - CARE PROVIDER_API CALL
Renea Suggs (MD), Orthopaedic Surgery  [address] [place] [id number]  Phone: [phone]  Fax: [phone]

## 2017-02-10 NOTE — DISCHARGE NOTE ADULT - PLAN OF CARE
Pain control and PT You are being discharged to acute rehab.  Please continue to work toward your goals and improve.  You are currently TTWB on RLE.  You can f/u with orthopedics, Dr. Suggs in 1-2 weeks.  Please call upon discharge to set up your appointment.  Limit activity to what you can tolerate with PT, regular diet.

## 2017-02-10 NOTE — DISCHARGE NOTE ADULT - VISION (WITH CORRECTIVE LENSES IF THE PATIENT USUALLY WEARS THEM):
prescription glasses/Normal vision: sees adequately in most situations; can see medication labels, newsprint

## 2017-02-10 NOTE — DISCHARGE NOTE ADULT - MEDICATION SUMMARY - MEDICATIONS TO TAKE
I will START or STAY ON the medications listed below when I get home from the hospital:    acetaminophen 325 mg oral tablet  -- 2 tab(s) by mouth every 6 hours  -- Indication: For Mild pain    traMADol 50 mg oral tablet  -- 1 tab(s) by mouth every 8 hours, As needed, Moderate Pain (4 - 6)  -- Indication: For moderate pain    ibuprofen 400 mg oral tablet  -- 1 tab(s) by mouth 3 times a day, As Needed for mild pain  -- Indication: For mild pain    enoxaparin  -- 40 milligram(s) subcutaneously once a day  -- Indication: For DVT prophylaxis    lidocaine 5% topical film  -- Apply on skin to affected area once a day  -- Indication: For pain    docusate sodium 100 mg oral capsule  -- 1 cap(s) by mouth 3 times a day, As Needed for consitpation  -- Indication: For Constipation    polyethylene glycol 3350 oral powder for reconstitution  -- 17 gram(s) by mouth once a day, As Needed for constipation  -- Indication: For Constipation    senna oral tablet  -- 2 tab(s) by mouth once a day (at bedtime), As Needed for constipation  -- Indication: For Constipation

## 2017-02-10 NOTE — DISCHARGE NOTE ADULT - PATIENT PORTAL LINK FT
“You can access the [facility] Patient Portal, offered by [facility], by registering with the following website: [email or web address]”

## 2017-02-10 NOTE — DISCHARGE NOTE ADULT - OTHER SIGNIFICANT FINDINGS
You endorsed a rash 2/2 oxycodone.  [facility] [facility] acute rehab may also  make recommendations for pain control which you should follow.  Ween your pain control, take laxatives for constipation 2/2 your pain meds.
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
## 2017-02-11 PROCEDURE — 99232 SBSQ HOSP IP/OBS MODERATE 35: CPT

## 2017-02-11 PROCEDURE — 93010 ELECTROCARDIOGRAM REPORT: CPT

## 2017-02-12 PROCEDURE — 99232 SBSQ HOSP IP/OBS MODERATE 35: CPT

## 2017-02-13 PROCEDURE — 99232 SBSQ HOSP IP/OBS MODERATE 35: CPT

## 2017-02-14 PROCEDURE — 99232 SBSQ HOSP IP/OBS MODERATE 35: CPT

## 2017-02-15 PROCEDURE — 99232 SBSQ HOSP IP/OBS MODERATE 35: CPT

## 2017-02-16 PROCEDURE — 99232 SBSQ HOSP IP/OBS MODERATE 35: CPT

## 2017-02-17 PROCEDURE — 99232 SBSQ HOSP IP/OBS MODERATE 35: CPT

## 2017-02-18 PROCEDURE — 99232 SBSQ HOSP IP/OBS MODERATE 35: CPT

## 2017-02-19 PROCEDURE — 99232 SBSQ HOSP IP/OBS MODERATE 35: CPT

## 2017-02-20 PROCEDURE — 99232 SBSQ HOSP IP/OBS MODERATE 35: CPT

## 2017-02-21 PROCEDURE — 99232 SBSQ HOSP IP/OBS MODERATE 35: CPT

## 2017-02-23 PROCEDURE — 99238 HOSP IP/OBS DSCHRG MGMT 30/<: CPT

## 2017-03-12 PROCEDURE — 97535 SELF CARE MNGMENT TRAINING: CPT

## 2017-03-12 PROCEDURE — 97116 GAIT TRAINING THERAPY: CPT

## 2017-03-12 PROCEDURE — 85025 COMPLETE CBC W/AUTO DIFF WBC: CPT

## 2017-03-12 PROCEDURE — 97530 THERAPEUTIC ACTIVITIES: CPT

## 2017-03-12 PROCEDURE — 80048 BASIC METABOLIC PNL TOTAL CA: CPT

## 2017-03-12 PROCEDURE — 97163 PT EVAL HIGH COMPLEX 45 MIN: CPT

## 2017-03-12 PROCEDURE — 81003 URINALYSIS AUTO W/O SCOPE: CPT

## 2017-03-12 PROCEDURE — 93005 ELECTROCARDIOGRAM TRACING: CPT

## 2017-03-12 PROCEDURE — 97167 OT EVAL HIGH COMPLEX 60 MIN: CPT

## 2017-03-12 PROCEDURE — 87086 URINE CULTURE/COLONY COUNT: CPT

## 2017-03-12 PROCEDURE — 80053 COMPREHEN METABOLIC PANEL: CPT

## 2017-03-12 PROCEDURE — 97110 THERAPEUTIC EXERCISES: CPT

## 2017-03-12 PROCEDURE — 85027 COMPLETE CBC AUTOMATED: CPT

## 2017-03-12 PROCEDURE — 82306 VITAMIN D 25 HYDROXY: CPT

## 2017-04-10 ENCOUNTER — APPOINTMENT (OUTPATIENT)
Dept: INTERNAL MEDICINE | Facility: CLINIC | Age: 62
End: 2017-04-10

## 2017-04-10 VITALS
WEIGHT: 155 LBS | HEART RATE: 81 BPM | TEMPERATURE: 98.1 F | BODY MASS INDEX: 28.52 KG/M2 | OXYGEN SATURATION: 94 % | DIASTOLIC BLOOD PRESSURE: 60 MMHG | HEIGHT: 62 IN | SYSTOLIC BLOOD PRESSURE: 120 MMHG

## 2017-04-10 DIAGNOSIS — R74.8 ABNORMAL LEVELS OF OTHER SERUM ENZYMES: ICD-10-CM

## 2017-04-10 DIAGNOSIS — R39.9 UNSPECIFIED SYMPTOMS AND SIGNS INVOLVING THE GENITOURINARY SYSTEM: ICD-10-CM

## 2017-04-10 DIAGNOSIS — J06.9 ACUTE UPPER RESPIRATORY INFECTION, UNSPECIFIED: ICD-10-CM

## 2017-04-21 LAB
25(OH)D3 SERPL-MCNC: 51.2 NG/ML
ALBUMIN SERPL ELPH-MCNC: 4.5 G/DL
ALP BLD-CCNC: 109 U/L
ALT SERPL-CCNC: 57 U/L
ANION GAP SERPL CALC-SCNC: 18 MMOL/L
APPEARANCE: CLEAR
AST SERPL-CCNC: 36 U/L
BACTERIA UR CULT: ABNORMAL
BACTERIA: NEGATIVE
BILIRUB SERPL-MCNC: 0.5 MG/DL
BILIRUBIN URINE: NEGATIVE
BLOOD URINE: NEGATIVE
BUN SERPL-MCNC: 21 MG/DL
CALCIUM SERPL-MCNC: 10 MG/DL
CHLORIDE SERPL-SCNC: 101 MMOL/L
CO2 SERPL-SCNC: 24 MMOL/L
COLOR: ABNORMAL
CREAT SERPL-MCNC: 0.62 MG/DL
GLUCOSE QUALITATIVE U: NORMAL MG/DL
GLUCOSE SERPL-MCNC: 79 MG/DL
HBV SURFACE AB SER QL: REACTIVE
HBV SURFACE AG SER QL: NONREACTIVE
HCV AB SER QL: NONREACTIVE
HCV S/CO RATIO: 0.11 S/CO
HYALINE CASTS: 3 /LPF
KETONES URINE: ABNORMAL
LEUKOCYTE ESTERASE URINE: NEGATIVE
MICROSCOPIC-UA: NORMAL
NITRITE URINE: NEGATIVE
PH URINE: 5
POTASSIUM SERPL-SCNC: 4.2 MMOL/L
PROT SERPL-MCNC: 7.9 G/DL
PROTEIN URINE: NEGATIVE MG/DL
RED BLOOD CELLS URINE: 7 /HPF
SODIUM SERPL-SCNC: 143 MMOL/L
SPECIFIC GRAVITY URINE: 1.03
SQUAMOUS EPITHELIAL CELLS: 4 /HPF
UROBILINOGEN URINE: 1 MG/DL
WHITE BLOOD CELLS URINE: 2 /HPF

## 2017-05-19 ENCOUNTER — APPOINTMENT (OUTPATIENT)
Dept: UROLOGY | Facility: CLINIC | Age: 62
End: 2017-05-19

## 2017-05-19 VITALS
DIASTOLIC BLOOD PRESSURE: 81 MMHG | SYSTOLIC BLOOD PRESSURE: 119 MMHG | TEMPERATURE: 98.1 F | HEART RATE: 94 BPM | RESPIRATION RATE: 15 BRPM

## 2017-05-22 LAB — BACTERIA UR CULT: ABNORMAL

## 2017-06-21 ENCOUNTER — RESULT REVIEW (OUTPATIENT)
Age: 62
End: 2017-06-21

## 2017-06-30 ENCOUNTER — APPOINTMENT (OUTPATIENT)
Dept: UROLOGY | Facility: CLINIC | Age: 62
End: 2017-06-30

## 2017-08-21 ENCOUNTER — RESULT REVIEW (OUTPATIENT)
Age: 62
End: 2017-08-21

## 2017-08-29 ENCOUNTER — MESSAGE (OUTPATIENT)
Age: 62
End: 2017-08-29

## 2017-08-30 ENCOUNTER — APPOINTMENT (OUTPATIENT)
Dept: INTERNAL MEDICINE | Facility: CLINIC | Age: 62
End: 2017-08-30
Payer: COMMERCIAL

## 2017-08-30 VITALS
HEART RATE: 81 BPM | SYSTOLIC BLOOD PRESSURE: 110 MMHG | HEIGHT: 61 IN | TEMPERATURE: 98.2 F | OXYGEN SATURATION: 97 % | BODY MASS INDEX: 30.21 KG/M2 | WEIGHT: 160 LBS | DIASTOLIC BLOOD PRESSURE: 70 MMHG

## 2017-08-30 DIAGNOSIS — R68.89 OTHER GENERAL SYMPTOMS AND SIGNS: ICD-10-CM

## 2017-08-30 DIAGNOSIS — Z11.59 ENCOUNTER FOR SCREENING FOR OTHER VIRAL DISEASES: ICD-10-CM

## 2017-08-30 PROCEDURE — 99214 OFFICE O/P EST MOD 30 MIN: CPT

## 2017-08-30 RX ORDER — ASPIRIN 81 MG
81 TABLET, DELAYED RELEASE (ENTERIC COATED) ORAL
Refills: 0 | Status: COMPLETED | COMMUNITY
End: 2017-08-30

## 2017-08-30 RX ORDER — TOBRAMYCIN AND DEXAMETHASONE 3; 1 MG/ML; MG/ML
0.3-0.1 SUSPENSION/ DROPS OPHTHALMIC
Qty: 5 | Refills: 0 | Status: COMPLETED | COMMUNITY
Start: 2017-08-16

## 2017-08-30 RX ORDER — ENOXAPARIN SODIUM 100 MG/ML
40 INJECTION SUBCUTANEOUS
Qty: 6 | Refills: 0 | Status: COMPLETED | COMMUNITY
Start: 2017-03-22

## 2017-09-12 ENCOUNTER — APPOINTMENT (OUTPATIENT)
Dept: INTERNAL MEDICINE | Facility: CLINIC | Age: 62
End: 2017-09-12
Payer: COMMERCIAL

## 2017-09-12 VITALS
HEART RATE: 85 BPM | OXYGEN SATURATION: 98 % | DIASTOLIC BLOOD PRESSURE: 70 MMHG | TEMPERATURE: 98.7 F | WEIGHT: 157 LBS | BODY MASS INDEX: 28.89 KG/M2 | SYSTOLIC BLOOD PRESSURE: 110 MMHG | HEIGHT: 62 IN

## 2017-09-12 DIAGNOSIS — K22.9 DISEASE OF ESOPHAGUS, UNSPECIFIED: ICD-10-CM

## 2017-09-12 PROCEDURE — 99214 OFFICE O/P EST MOD 30 MIN: CPT

## 2017-09-12 RX ORDER — RANITIDINE 150 MG/1
150 TABLET ORAL
Qty: 60 | Refills: 0 | Status: COMPLETED | COMMUNITY
Start: 2017-07-19 | End: 2017-09-12

## 2017-09-28 ENCOUNTER — EMERGENCY (EMERGENCY)
Facility: HOSPITAL | Age: 62
LOS: 1 days | Discharge: HOME | End: 2017-09-28
Attending: EMERGENCY MEDICINE
Payer: COMMERCIAL

## 2017-09-28 VITALS
WEIGHT: 156.97 LBS | RESPIRATION RATE: 18 BRPM | HEART RATE: 105 BPM | TEMPERATURE: 99 F | DIASTOLIC BLOOD PRESSURE: 82 MMHG | OXYGEN SATURATION: 97 % | SYSTOLIC BLOOD PRESSURE: 153 MMHG

## 2017-09-28 VITALS
DIASTOLIC BLOOD PRESSURE: 81 MMHG | OXYGEN SATURATION: 97 % | RESPIRATION RATE: 18 BRPM | SYSTOLIC BLOOD PRESSURE: 124 MMHG | HEART RATE: 96 BPM | TEMPERATURE: 98 F

## 2017-09-28 LAB
ALBUMIN SERPL ELPH-MCNC: 4.3 G/DL — SIGNIFICANT CHANGE UP (ref 3.3–5)
ALP SERPL-CCNC: 93 U/L — SIGNIFICANT CHANGE UP (ref 40–120)
ALT FLD-CCNC: 18 U/L RC — SIGNIFICANT CHANGE UP (ref 10–45)
ANION GAP SERPL CALC-SCNC: 13 MMOL/L — SIGNIFICANT CHANGE UP (ref 5–17)
AST SERPL-CCNC: 29 U/L — SIGNIFICANT CHANGE UP (ref 10–40)
BASOPHILS # BLD AUTO: 0 K/UL — SIGNIFICANT CHANGE UP (ref 0–0.2)
BASOPHILS NFR BLD AUTO: 0.2 % — SIGNIFICANT CHANGE UP (ref 0–2)
BILIRUB SERPL-MCNC: 0.5 MG/DL — SIGNIFICANT CHANGE UP (ref 0.2–1.2)
BUN SERPL-MCNC: 18 MG/DL — SIGNIFICANT CHANGE UP (ref 7–23)
CALCIUM SERPL-MCNC: 9.9 MG/DL — SIGNIFICANT CHANGE UP (ref 8.4–10.5)
CHLORIDE SERPL-SCNC: 103 MMOL/L — SIGNIFICANT CHANGE UP (ref 96–108)
CO2 SERPL-SCNC: 24 MMOL/L — SIGNIFICANT CHANGE UP (ref 22–31)
CREAT SERPL-MCNC: 0.89 MG/DL — SIGNIFICANT CHANGE UP (ref 0.5–1.3)
EOSINOPHIL # BLD AUTO: 0 K/UL — SIGNIFICANT CHANGE UP (ref 0–0.5)
EOSINOPHIL NFR BLD AUTO: 0.4 % — SIGNIFICANT CHANGE UP (ref 0–6)
GLUCOSE SERPL-MCNC: 104 MG/DL — HIGH (ref 70–99)
HCT VFR BLD CALC: 43 % — SIGNIFICANT CHANGE UP (ref 34.5–45)
HGB BLD-MCNC: 14.5 G/DL — SIGNIFICANT CHANGE UP (ref 11.5–15.5)
LYMPHOCYTES # BLD AUTO: 1.9 K/UL — SIGNIFICANT CHANGE UP (ref 1–3.3)
LYMPHOCYTES # BLD AUTO: 16.8 % — SIGNIFICANT CHANGE UP (ref 13–44)
MCHC RBC-ENTMCNC: 33.2 PG — SIGNIFICANT CHANGE UP (ref 27–34)
MCHC RBC-ENTMCNC: 33.8 GM/DL — SIGNIFICANT CHANGE UP (ref 32–36)
MCV RBC AUTO: 98.2 FL — SIGNIFICANT CHANGE UP (ref 80–100)
MONOCYTES # BLD AUTO: 0.7 K/UL — SIGNIFICANT CHANGE UP (ref 0–0.9)
MONOCYTES NFR BLD AUTO: 5.9 % — SIGNIFICANT CHANGE UP (ref 2–14)
NEUTROPHILS # BLD AUTO: 8.8 K/UL — HIGH (ref 1.8–7.4)
NEUTROPHILS NFR BLD AUTO: 76.7 % — SIGNIFICANT CHANGE UP (ref 43–77)
PLATELET # BLD AUTO: 272 K/UL — SIGNIFICANT CHANGE UP (ref 150–400)
POTASSIUM SERPL-MCNC: 5 MMOL/L — SIGNIFICANT CHANGE UP (ref 3.5–5.3)
POTASSIUM SERPL-SCNC: 5 MMOL/L — SIGNIFICANT CHANGE UP (ref 3.5–5.3)
PROT SERPL-MCNC: 7.9 G/DL — SIGNIFICANT CHANGE UP (ref 6–8.3)
RBC # BLD: 4.38 M/UL — SIGNIFICANT CHANGE UP (ref 3.8–5.2)
RBC # FLD: 11.1 % — SIGNIFICANT CHANGE UP (ref 10.3–14.5)
SODIUM SERPL-SCNC: 140 MMOL/L — SIGNIFICANT CHANGE UP (ref 135–145)
WBC # BLD: 11.5 K/UL — HIGH (ref 3.8–10.5)
WBC # FLD AUTO: 11.5 K/UL — HIGH (ref 3.8–10.5)

## 2017-09-28 PROCEDURE — 80053 COMPREHEN METABOLIC PANEL: CPT

## 2017-09-28 PROCEDURE — 85027 COMPLETE CBC AUTOMATED: CPT

## 2017-09-28 PROCEDURE — 99284 EMERGENCY DEPT VISIT MOD MDM: CPT

## 2017-09-28 RX ORDER — SODIUM CHLORIDE 9 MG/ML
1000 INJECTION INTRAMUSCULAR; INTRAVENOUS; SUBCUTANEOUS ONCE
Qty: 0 | Refills: 0 | Status: COMPLETED | OUTPATIENT
Start: 2017-09-28 | End: 2017-09-28

## 2017-09-28 RX ADMIN — SODIUM CHLORIDE 2000 MILLILITER(S): 9 INJECTION INTRAMUSCULAR; INTRAVENOUS; SUBCUTANEOUS at 22:58

## 2017-09-28 NOTE — ED PROVIDER NOTE - MEDICAL DECISION MAKING DETAILS
62yoF presenting with pruritic rash of chest and pelvis, as well as flexor ACs. Concern for contact derm vs allergic ingestion. No respiratory sx at this time. Labs do not show flagrant eiosinophilia and I suspect pt will improve with time and antihistamines. Discussion is had of respiratory involvement being a reason for immediate return to the emergency department, pt is understanding of this. Will d/c with f/u dermatology. 62yoF presenting with pruritic rash of chest and pelvis, as well as flexor ACs. Concern for contact derm vs allergic ingestion. No respiratory sx at this time. Labs do not show flagrant eosinophilia and I suspect pt will improve with time and antihistamines. Discussion is had of respiratory involvement being a reason for immediate return to the emergency department, pt is understanding of this. Will d/c with f/u dermatology.

## 2017-09-28 NOTE — ED PROVIDER NOTE - ATTENDING CONTRIBUTION TO CARE
Patient with non-life threatening rash, no stridor, no wheezing, no nausea/vomiting. No other complaints. Patient has tried hydrocortisone and benadryl with mild relief.   no stridor, no wheezing, Well appearing, ctab, non-tachycardic, non-tachypneic, erythematous blanching rash without urticaria or vesicles/petechiae, no edema, ntnd  will get cbc, cmp and reassess  No immediate life threatening issues present on history or clinical exam. Patient is a safe disposition home, has capacity and insight into their condition, ambulatory in the emergency department and will follow up with their doctor(s) this week. Patient  understands anticipatory guidance and was given strict return and follow up precautions. The patient has been informed of all concerning signs and symptoms to return to Emergency Department, the necessity to follow up with the PMD/Clinic/follow up provided within 2-3 days was explained, and the patient reports understanding of above with capacity and insight.

## 2017-09-28 NOTE — ED ADULT NURSE NOTE - OBJECTIVE STATEMENT
63 y/o female hx of esophagial reflux came in c/o rashes to bilateral breast, left flank/back area since yesterday, reports taking benadryl at 7pm and felt "weird: to lips. No swelling to throat, no difficulty breathing. Denies fever/chills, no chest pain or SOB, no nausea or vomiting. Safety maintained & continue monitor.

## 2017-09-28 NOTE — ED PROVIDER NOTE - OBJECTIVE STATEMENT
The patient notes that over the past two days she has developed a rash on her breasts and lower abdomen/pelvis that is itchy and elevated. She has had allergic reactions in the past and decided to take benadryl and notes that this initially helped her rash, but that she had to keep taking it over the past day and a half to keep the rash alleviated. She saw her endocrinologist yesterday and at that time was told it was likely a contact dermatitis and that she should use hydrocortisone cream. She did this for a day but notes that the rash does not seem to be improving and instead worsening. The only new [id number] for her in the past month is prilosec. She started this approximately one month ago for treatment of GERD. She has not had recent travel and does not believe she has been wheezing or had any shortness of breath. She has had some abnormal feelings in her lips but denies any swelling or feelings of throat closure. She has also noticed that there seems to be a welt along the roof of her mouth, she believes it came on two days ago. She denies other new symptoms of concern at this time. She was last evaluated by a rheumatologist ~4-6 years ago and was told at that time that she did not have "anything wrong with (her)".

## 2017-09-28 NOTE — ED PROVIDER NOTE - CARE PLAN
Principal Discharge DX:	Pruritic rash  Instructions for follow-up, activity and diet:	Follow up with dermatology, call [phone] or [phone] to schedule an appointment to be seen for your rash. Continue use of home benadryl as needed. Apply the hydrocortisone cream previously prescribed to your affected areas. Return here to the emergency department for difficulties breathing, worsening symptoms, or other new concerns.

## 2017-09-28 NOTE — ED PROVIDER NOTE - NS ED ROS FT
General: No fevers, "possibly" chills  HENT: No head trauma, ear pain, positive intermittent rhinorrhea, no sore throat, +oral lesion  Eyes: No visual changes  CP: No chest pain, palpitations, or light headedness  Resp: No shortness of breath, no cough  GI: No abdominal pain, diarrhea, constipation, nausea, or vomiting  : No urinary fz, dysuria, or hematuria  Neuro: No numbness, tingling, or weakness  Skin: + rash  Endo: No hx of diabetes  Heme: No hx of easy bleeding or bruising

## 2017-09-28 NOTE — ED PROVIDER NOTE - PLAN OF CARE
Follow up with dermatology, call [phone] or [phone] to schedule an appointment to be seen for your rash. Continue use of home benadryl as needed. Apply the hydrocortisone cream previously prescribed to your affected areas. Return here to the emergency department for difficulties breathing, worsening symptoms, or other new concerns.

## 2017-09-28 NOTE — ED PROVIDER NOTE - PHYSICAL EXAMINATION
Gen: NAD, non-toxic, conversational  Eyes: PERRL, EOMI   HENT: Normocephalic, atraumatic. External ears normal, no rhinorrhea, moist mucous membranes.   CV: regular rhythm, borderline tachycardia, no M/R/G  Resp: CTAB, non-labored, speaking without difficulty on room air  Abd: soft, non tender, non rigid, no guarding or rebound tenderness  Skin: dry, wwp, erythematous patches of the bilateral breasts, bilateral flexor antecubital fossae, and pelvis  Neuro: AOx3, speech is fluent and appropriate  Psych: Mood anxious, affect euthymic

## 2017-09-29 ENCOUNTER — APPOINTMENT (OUTPATIENT)
Dept: INTERNAL MEDICINE | Facility: CLINIC | Age: 62
End: 2017-09-29
Payer: COMMERCIAL

## 2017-09-29 ENCOUNTER — APPOINTMENT (OUTPATIENT)
Dept: DERMATOLOGY | Facility: CLINIC | Age: 62
End: 2017-09-29
Payer: COMMERCIAL

## 2017-09-29 VITALS
DIASTOLIC BLOOD PRESSURE: 71 MMHG | OXYGEN SATURATION: 95 % | HEIGHT: 62 IN | HEART RATE: 96 BPM | TEMPERATURE: 98.6 F | BODY MASS INDEX: 28.89 KG/M2 | WEIGHT: 157 LBS | SYSTOLIC BLOOD PRESSURE: 110 MMHG

## 2017-09-29 VITALS — WEIGHT: 157 LBS | HEIGHT: 62 IN | BODY MASS INDEX: 28.89 KG/M2

## 2017-09-29 DIAGNOSIS — Z80.9 FAMILY HISTORY OF MALIGNANT NEOPLASM, UNSPECIFIED: ICD-10-CM

## 2017-09-29 DIAGNOSIS — Z87.19 PERSONAL HISTORY OF OTHER DISEASES OF THE DIGESTIVE SYSTEM: ICD-10-CM

## 2017-09-29 DIAGNOSIS — Z91.89 OTHER SPECIFIED PERSONAL RISK FACTORS, NOT ELSEWHERE CLASSIFIED: ICD-10-CM

## 2017-09-29 DIAGNOSIS — Z87.39 PERSONAL HISTORY OF OTHER DISEASES OF THE MUSCULOSKELETAL SYSTEM AND CONNECTIVE TISSUE: ICD-10-CM

## 2017-09-29 DIAGNOSIS — T78.40XA ALLERGY, UNSPECIFIED, INITIAL ENCOUNTER: ICD-10-CM

## 2017-09-29 DIAGNOSIS — L50.8 OTHER URTICARIA: ICD-10-CM

## 2017-09-29 DIAGNOSIS — Z84.0 FAMILY HISTORY OF DISEASES OF THE SKIN AND SUBCUTANEOUS TISSUE: ICD-10-CM

## 2017-09-29 PROCEDURE — 99214 OFFICE O/P EST MOD 30 MIN: CPT

## 2017-09-29 PROCEDURE — 99203 OFFICE O/P NEW LOW 30 MIN: CPT | Mod: GC

## 2017-09-29 RX ORDER — OMEPRAZOLE 20 MG/1
20 CAPSULE, DELAYED RELEASE ORAL
Qty: 30 | Refills: 0 | Status: COMPLETED | COMMUNITY
Start: 2017-09-11

## 2017-10-18 ENCOUNTER — MESSAGE (OUTPATIENT)
Age: 62
End: 2017-10-18

## 2017-10-24 ENCOUNTER — APPOINTMENT (OUTPATIENT)
Dept: ALLERGY | Facility: CLINIC | Age: 62
End: 2017-10-24
Payer: COMMERCIAL

## 2017-10-24 VITALS
HEIGHT: 62 IN | DIASTOLIC BLOOD PRESSURE: 70 MMHG | SYSTOLIC BLOOD PRESSURE: 120 MMHG | WEIGHT: 154 LBS | RESPIRATION RATE: 14 BRPM | HEART RATE: 80 BPM | BODY MASS INDEX: 28.34 KG/M2

## 2017-10-24 PROCEDURE — 95018 ALL TSTG PERQ&IQ DRUGS/BIOL: CPT

## 2017-10-24 PROCEDURE — 99244 OFF/OP CNSLTJ NEW/EST MOD 40: CPT | Mod: 25

## 2017-10-24 PROCEDURE — 95004 PERQ TESTS W/ALRGNC XTRCS: CPT

## 2017-10-24 RX ORDER — CALCIUM CITRATE/VITAMIN D3 315MG-6.25
TABLET ORAL DAILY
Refills: 0 | Status: DISCONTINUED | COMMUNITY
End: 2017-10-24

## 2017-10-24 RX ORDER — TRIAMCINOLONE ACETONIDE 5 MG/G
0.5 CREAM TOPICAL
Qty: 1 | Refills: 1 | Status: DISCONTINUED | COMMUNITY
Start: 2017-09-29 | End: 2017-10-24

## 2017-10-24 RX ORDER — SUCRALFATE 1 G/10ML
1 SUSPENSION ORAL 3 TIMES DAILY
Qty: 1 | Refills: 3 | Status: DISCONTINUED | COMMUNITY
Start: 2017-09-29 | End: 2017-10-24

## 2017-10-30 ENCOUNTER — NON-APPOINTMENT (OUTPATIENT)
Age: 62
End: 2017-10-30

## 2017-10-30 ENCOUNTER — APPOINTMENT (OUTPATIENT)
Dept: INTERNAL MEDICINE | Facility: CLINIC | Age: 62
End: 2017-10-30
Payer: COMMERCIAL

## 2017-10-30 VITALS — SYSTOLIC BLOOD PRESSURE: 112 MMHG | DIASTOLIC BLOOD PRESSURE: 78 MMHG

## 2017-10-30 VITALS
TEMPERATURE: 98.2 F | SYSTOLIC BLOOD PRESSURE: 90 MMHG | WEIGHT: 155 LBS | OXYGEN SATURATION: 98 % | BODY MASS INDEX: 29.27 KG/M2 | DIASTOLIC BLOOD PRESSURE: 62 MMHG | HEART RATE: 86 BPM | HEIGHT: 61 IN

## 2017-10-30 DIAGNOSIS — Z80.0 FAMILY HISTORY OF MALIGNANT NEOPLASM OF DIGESTIVE ORGANS: ICD-10-CM

## 2017-10-30 LAB
BASOPHILS # BLD AUTO: 0.02 K/UL
BASOPHILS NFR BLD AUTO: 0.3 %
EOSINOPHIL # BLD AUTO: 0.11 K/UL
EOSINOPHIL NFR BLD AUTO: 1.8 %
HCT VFR BLD CALC: 42.7 %
HGB BLD-MCNC: 13.9 G/DL
IMM GRANULOCYTES NFR BLD AUTO: 0 %
LYMPHOCYTES # BLD AUTO: 2.28 K/UL
LYMPHOCYTES NFR BLD AUTO: 36.5 %
MAN DIFF?: NORMAL
MCHC RBC-ENTMCNC: 31.9 PG
MCHC RBC-ENTMCNC: 32.6 GM/DL
MCV RBC AUTO: 97.9 FL
MONOCYTES # BLD AUTO: 0.5 K/UL
MONOCYTES NFR BLD AUTO: 8 %
NEUTROPHILS # BLD AUTO: 3.33 K/UL
NEUTROPHILS NFR BLD AUTO: 53.4 %
PLATELET # BLD AUTO: 272 K/UL
RBC # BLD: 4.36 M/UL
RBC # FLD: 12.7 %
WBC # FLD AUTO: 6.24 K/UL

## 2017-10-30 PROCEDURE — 93000 ELECTROCARDIOGRAM COMPLETE: CPT

## 2017-10-30 PROCEDURE — 36415 COLL VENOUS BLD VENIPUNCTURE: CPT

## 2017-10-30 PROCEDURE — 99396 PREV VISIT EST AGE 40-64: CPT | Mod: 25

## 2017-10-30 PROCEDURE — 90715 TDAP VACCINE 7 YRS/> IM: CPT

## 2017-10-30 PROCEDURE — 90471 IMMUNIZATION ADMIN: CPT

## 2017-11-10 ENCOUNTER — APPOINTMENT (OUTPATIENT)
Dept: INTERNAL MEDICINE | Facility: CLINIC | Age: 62
End: 2017-11-10
Payer: COMMERCIAL

## 2017-11-10 PROCEDURE — 90686 IIV4 VACC NO PRSV 0.5 ML IM: CPT

## 2017-11-10 PROCEDURE — G0008: CPT

## 2017-12-29 ENCOUNTER — TRANSCRIPTION ENCOUNTER (OUTPATIENT)
Age: 62
End: 2017-12-29

## 2018-01-04 ENCOUNTER — TRANSCRIPTION ENCOUNTER (OUTPATIENT)
Age: 63
End: 2018-01-04

## 2018-03-30 ENCOUNTER — TRANSCRIPTION ENCOUNTER (OUTPATIENT)
Age: 63
End: 2018-03-30

## 2018-04-02 ENCOUNTER — APPOINTMENT (OUTPATIENT)
Dept: INTERNAL MEDICINE | Facility: CLINIC | Age: 63
End: 2018-04-02
Payer: COMMERCIAL

## 2018-04-02 ENCOUNTER — NON-APPOINTMENT (OUTPATIENT)
Age: 63
End: 2018-04-02

## 2018-04-02 VITALS
TEMPERATURE: 98.6 F | DIASTOLIC BLOOD PRESSURE: 68 MMHG | SYSTOLIC BLOOD PRESSURE: 110 MMHG | OXYGEN SATURATION: 97 % | HEART RATE: 113 BPM

## 2018-04-02 VITALS — HEART RATE: 118 BPM

## 2018-04-02 DIAGNOSIS — R00.0 TACHYCARDIA, UNSPECIFIED: ICD-10-CM

## 2018-04-02 PROCEDURE — 93000 ELECTROCARDIOGRAM COMPLETE: CPT

## 2018-04-02 PROCEDURE — 99214 OFFICE O/P EST MOD 30 MIN: CPT

## 2018-04-02 RX ORDER — MUPIROCIN 20 MG/G
2 OINTMENT TOPICAL
Qty: 22 | Refills: 0 | Status: DISCONTINUED | COMMUNITY
Start: 2017-12-06

## 2018-04-02 RX ORDER — DOXYCYCLINE HYCLATE 100 MG/1
100 CAPSULE ORAL
Qty: 20 | Refills: 0 | Status: DISCONTINUED | COMMUNITY
Start: 2017-12-29

## 2018-04-02 RX ORDER — OSELTAMIVIR PHOSPHATE 75 MG/1
75 CAPSULE ORAL
Qty: 10 | Refills: 0 | Status: DISCONTINUED | COMMUNITY
Start: 2018-03-30

## 2018-04-05 ENCOUNTER — APPOINTMENT (OUTPATIENT)
Dept: INTERNAL MEDICINE | Facility: CLINIC | Age: 63
End: 2018-04-05
Payer: COMMERCIAL

## 2018-04-05 ENCOUNTER — OUTPATIENT (OUTPATIENT)
Dept: OUTPATIENT SERVICES | Facility: HOSPITAL | Age: 63
LOS: 1 days | End: 2018-04-05
Payer: COMMERCIAL

## 2018-04-05 ENCOUNTER — APPOINTMENT (OUTPATIENT)
Dept: RADIOLOGY | Facility: CLINIC | Age: 63
End: 2018-04-05
Payer: COMMERCIAL

## 2018-04-05 VITALS — OXYGEN SATURATION: 99 % | HEART RATE: 94 BPM | RESPIRATION RATE: 12 BRPM

## 2018-04-05 VITALS
SYSTOLIC BLOOD PRESSURE: 110 MMHG | OXYGEN SATURATION: 97 % | HEART RATE: 108 BPM | TEMPERATURE: 97.7 F | WEIGHT: 156 LBS | DIASTOLIC BLOOD PRESSURE: 70 MMHG | BODY MASS INDEX: 29.45 KG/M2 | HEIGHT: 61 IN

## 2018-04-05 DIAGNOSIS — J06.9 ACUTE UPPER RESPIRATORY INFECTION, UNSPECIFIED: ICD-10-CM

## 2018-04-05 PROCEDURE — 99214 OFFICE O/P EST MOD 30 MIN: CPT | Mod: 25

## 2018-04-05 PROCEDURE — 36415 COLL VENOUS BLD VENIPUNCTURE: CPT

## 2018-04-05 PROCEDURE — 71046 X-RAY EXAM CHEST 2 VIEWS: CPT

## 2018-04-05 PROCEDURE — 71046 X-RAY EXAM CHEST 2 VIEWS: CPT | Mod: 26

## 2018-04-06 LAB
ALBUMIN SERPL ELPH-MCNC: 4.4 G/DL
ALP BLD-CCNC: 79 U/L
ALT SERPL-CCNC: 9 U/L
ANION GAP SERPL CALC-SCNC: 12 MMOL/L
AST SERPL-CCNC: 19 U/L
BASOPHILS # BLD AUTO: 0.01 K/UL
BASOPHILS NFR BLD AUTO: 0.2 %
BILIRUB SERPL-MCNC: 0.3 MG/DL
BUN SERPL-MCNC: 20 MG/DL
CALCIUM SERPL-MCNC: 9.8 MG/DL
CHLORIDE SERPL-SCNC: 102 MMOL/L
CO2 SERPL-SCNC: 25 MMOL/L
CREAT SERPL-MCNC: 0.76 MG/DL
EOSINOPHIL # BLD AUTO: 0.09 K/UL
EOSINOPHIL NFR BLD AUTO: 1.6 %
GLUCOSE SERPL-MCNC: 93 MG/DL
HCT VFR BLD CALC: 41.9 %
HGB BLD-MCNC: 13.2 G/DL
IMM GRANULOCYTES NFR BLD AUTO: 0.2 %
LYMPHOCYTES # BLD AUTO: 2.5 K/UL
LYMPHOCYTES NFR BLD AUTO: 45.1 %
MAN DIFF?: NORMAL
MCHC RBC-ENTMCNC: 31.2 PG
MCHC RBC-ENTMCNC: 31.5 GM/DL
MCV RBC AUTO: 99.1 FL
MONOCYTES # BLD AUTO: 0.42 K/UL
MONOCYTES NFR BLD AUTO: 7.6 %
NEUTROPHILS # BLD AUTO: 2.51 K/UL
NEUTROPHILS NFR BLD AUTO: 45.3 %
PLATELET # BLD AUTO: 279 K/UL
POTASSIUM SERPL-SCNC: 4.3 MMOL/L
PROT SERPL-MCNC: 7.7 G/DL
RBC # BLD: 4.23 M/UL
RBC # FLD: 12.6 %
SODIUM SERPL-SCNC: 139 MMOL/L
TSH SERPL-ACNC: 1.66 UIU/ML
WBC # FLD AUTO: 5.54 K/UL

## 2018-09-05 ENCOUNTER — TRANSCRIPTION ENCOUNTER (OUTPATIENT)
Age: 63
End: 2018-09-05

## 2018-11-30 ENCOUNTER — TRANSCRIPTION ENCOUNTER (OUTPATIENT)
Age: 63
End: 2018-11-30

## 2018-12-10 LAB
25(OH)D3 SERPL-MCNC: 36.9 NG/ML
ALBUMIN SERPL ELPH-MCNC: 4.3 G/DL
ALP BLD-CCNC: 90 U/L
ALT SERPL-CCNC: 18 U/L
ANION GAP SERPL CALC-SCNC: 14 MMOL/L
APPEARANCE: CLEAR
AST SERPL-CCNC: 22 U/L
BACTERIA: NEGATIVE
BILIRUB SERPL-MCNC: 0.4 MG/DL
BILIRUBIN URINE: NEGATIVE
BLOOD URINE: NEGATIVE
BUN SERPL-MCNC: 17 MG/DL
CALCIUM SERPL-MCNC: 9.8 MG/DL
CHLORIDE SERPL-SCNC: 101 MMOL/L
CHOLEST SERPL-MCNC: 277 MG/DL
CHOLEST/HDLC SERPL: 4.5 RATIO
CO2 SERPL-SCNC: 25 MMOL/L
COLOR: YELLOW
CREAT SERPL-MCNC: 0.77 MG/DL
GLUCOSE QUALITATIVE U: NEGATIVE MG/DL
GLUCOSE SERPL-MCNC: 85 MG/DL
HBA1C MFR BLD HPLC: 5.7 %
HDLC SERPL-MCNC: 61 MG/DL
HYALINE CASTS: 3 /LPF
KETONES URINE: ABNORMAL
LDLC SERPL CALC-MCNC: 191 MG/DL
LEUKOCYTE ESTERASE URINE: NEGATIVE
MICROSCOPIC-UA: NORMAL
NITRITE URINE: NEGATIVE
PH URINE: 5
POTASSIUM SERPL-SCNC: 4.7 MMOL/L
PROT SERPL-MCNC: 8.2 G/DL
PROTEIN URINE: NEGATIVE MG/DL
RED BLOOD CELLS URINE: 3 /HPF
SODIUM SERPL-SCNC: 140 MMOL/L
SPECIFIC GRAVITY URINE: 1.02
SQUAMOUS EPITHELIAL CELLS: 1 /HPF
T4 FREE SERPL-MCNC: 1 NG/DL
TRIGL SERPL-MCNC: 127 MG/DL
TSH SERPL-ACNC: 2.24 UIU/ML
UROBILINOGEN URINE: NEGATIVE MG/DL
WHITE BLOOD CELLS URINE: 2 /HPF

## 2019-02-11 PROBLEM — K21.9 GASTRO-ESOPHAGEAL REFLUX DISEASE WITHOUT ESOPHAGITIS: Chronic | Status: ACTIVE | Noted: 2017-09-28

## 2019-02-12 ENCOUNTER — APPOINTMENT (OUTPATIENT)
Dept: INTERNAL MEDICINE | Facility: CLINIC | Age: 64
End: 2019-02-12
Payer: COMMERCIAL

## 2019-02-12 VITALS
WEIGHT: 156 LBS | HEIGHT: 62 IN | OXYGEN SATURATION: 98 % | HEART RATE: 79 BPM | BODY MASS INDEX: 28.71 KG/M2 | DIASTOLIC BLOOD PRESSURE: 78 MMHG | SYSTOLIC BLOOD PRESSURE: 120 MMHG | TEMPERATURE: 97.8 F

## 2019-02-12 PROCEDURE — 99214 OFFICE O/P EST MOD 30 MIN: CPT

## 2019-02-12 RX ORDER — AZITHROMYCIN 250 MG/1
250 TABLET, FILM COATED ORAL
Qty: 1 | Refills: 0 | Status: COMPLETED | COMMUNITY
Start: 2018-04-02 | End: 2019-02-12

## 2019-02-18 ENCOUNTER — FORM ENCOUNTER (OUTPATIENT)
Age: 64
End: 2019-02-18

## 2019-02-19 ENCOUNTER — APPOINTMENT (OUTPATIENT)
Dept: ULTRASOUND IMAGING | Facility: CLINIC | Age: 64
End: 2019-02-19
Payer: COMMERCIAL

## 2019-02-19 ENCOUNTER — OUTPATIENT (OUTPATIENT)
Dept: OUTPATIENT SERVICES | Facility: HOSPITAL | Age: 64
LOS: 1 days | End: 2019-02-19
Payer: COMMERCIAL

## 2019-02-19 DIAGNOSIS — R22.1 LOCALIZED SWELLING, MASS AND LUMP, NECK: ICD-10-CM

## 2019-02-19 PROCEDURE — 76536 US EXAM OF HEAD AND NECK: CPT | Mod: 26

## 2019-02-19 PROCEDURE — 76536 US EXAM OF HEAD AND NECK: CPT

## 2019-02-19 NOTE — ASSESSMENT
[FreeTextEntry1] : Patient with mild fullness of neck, and no symptoms.  Will refer for a neck US for now, and observe if negative.  Patient to contact me for any new symptoms, or palpable masses.

## 2019-02-19 NOTE — PHYSICAL EXAM
[No Acute Distress] : no acute distress [Normal Voice/Communication] : normal voice/communication [Normal Sclera/Conjunctiva] : normal sclera/conjunctiva [Normal Outer Ear/Nose] : the outer ears and nose were normal in appearance [Normal Oropharynx] : the oropharynx was normal [Normal TMs] : both tympanic membranes were normal [Normal Nasal Mucosa] : the nasal mucosa was normal [No JVD] : no jugular venous distention [Supple] : supple [No Lymphadenopathy] : no lymphadenopathy [Thyroid Normal, No Nodules] : the thyroid was normal and there were no nodules present [No Respiratory Distress] : no respiratory distress  [Clear to Auscultation] : lungs were clear to auscultation bilaterally [No Accessory Muscle Use] : no accessory muscle use [Normal Rate] : normal rate  [Regular Rhythm] : with a regular rhythm [Normal S1, S2] : normal S1 and S2 [No Murmur] : no murmur heard [No Edema] : there was no peripheral edema [Soft] : abdomen soft [Non Tender] : non-tender [Non-distended] : non-distended [No Masses] : no abdominal mass palpated [Normal Bowel Sounds] : normal bowel sounds [de-identified] : Mild fullness of neck.  No masses.

## 2019-02-19 NOTE — ADDENDUM
[FreeTextEntry1] : 2/19/19 - U/S neck - normal appearing lymph nodes.  Left message for patient at 7:30PM.

## 2019-02-19 NOTE — HISTORY OF PRESENT ILLNESS
[FreeTextEntry8] : Patient was at her chiropractor's office yesterday, and the chiropractor mentioned that the patient's neck was somewhat swollen.  The patient was feeling well, and had no fevers, chills, sore throat, cough, or swollen glands.  She has no swallowing complaints.  She has been feeling well except for a "GI bug" last week, with some nausea and abdominal discomfort (and normal bowel movements).  Those symptoms resolved.  Several people at home have been ill, and no one had the flu or a strep throat.  Patient also notes that she thought she had a UTI recently, and had a normal urine dip at her cardiologist's office (Dr. Joel Goldberg).  She did not take any antibiotics.  No symptoms of hypo- or hyperthyroidism.

## 2019-04-12 NOTE — ED ADULT NURSE NOTE - CAS TRG GENERAL AIRWAY, MLM
2-0 chromic suture. The uterus was then replaced intra-abdominally. The pelvic gutters were explored, cleared of any clot collection, and the anterior abdominal wall peritoneum was closed with 3-0 chromic suture. The fascia was then reapproximated with running sutures of 0 Vicryl. The subcutaneous tissue was then re-approximated with 3 interrupted sutures of 3-0 plain gut, and the skin was reapproximated with 4-0 Vicryl in a subcuticular fashion. Instrument, sponge, and needle counts were correct prior the abdominal closure and at the conclusion of the case. The skin was then dressed with DermaBond, and the patient left the operating room in stable condition, and was transferred to recovery room. Findings:  Clear Fluid. Estimated Blood Loss:  550ml           Drains: Lazaro           Total IV Fluids: 2000 ml           Specimens: Placenta. Complications:  none           Condition: infant stable to general nursery and mother stable    Disposition: PACU - hemodynamically stable. Attending Attestation: I performed the procedure.     Rani Galdamez MD, [id number] [facility]

## 2019-05-31 ENCOUNTER — APPOINTMENT (OUTPATIENT)
Dept: INTERNAL MEDICINE | Facility: CLINIC | Age: 64
End: 2019-05-31
Payer: COMMERCIAL

## 2019-05-31 ENCOUNTER — NON-APPOINTMENT (OUTPATIENT)
Age: 64
End: 2019-05-31

## 2019-05-31 VITALS
DIASTOLIC BLOOD PRESSURE: 80 MMHG | HEART RATE: 83 BPM | SYSTOLIC BLOOD PRESSURE: 118 MMHG | HEIGHT: 61 IN | BODY MASS INDEX: 29.62 KG/M2 | TEMPERATURE: 98 F | WEIGHT: 156.9 LBS

## 2019-05-31 DIAGNOSIS — J06.9 ACUTE UPPER RESPIRATORY INFECTION, UNSPECIFIED: ICD-10-CM

## 2019-05-31 DIAGNOSIS — Z23 ENCOUNTER FOR IMMUNIZATION: ICD-10-CM

## 2019-05-31 PROCEDURE — G0444 DEPRESSION SCREEN ANNUAL: CPT

## 2019-05-31 PROCEDURE — 93000 ELECTROCARDIOGRAM COMPLETE: CPT

## 2019-05-31 PROCEDURE — 36415 COLL VENOUS BLD VENIPUNCTURE: CPT

## 2019-05-31 PROCEDURE — 99396 PREV VISIT EST AGE 40-64: CPT | Mod: 25

## 2019-06-06 PROBLEM — Z23 NEED FOR DIPHTHERIA-TETANUS-PERTUSSIS (TDAP) VACCINE, ADULT/ADOLESCENT: Status: RESOLVED | Noted: 2017-10-30 | Resolved: 2019-06-06

## 2019-06-06 PROBLEM — J06.9 URI WITH COUGH AND CONGESTION: Status: RESOLVED | Noted: 2018-04-02 | Resolved: 2019-06-06

## 2019-06-06 NOTE — PHYSICAL EXAM
[Well Nourished] : well nourished [No Acute Distress] : no acute distress [Well Developed] : well developed [Well-Appearing] : well-appearing [Normal Voice/Communication] : normal voice/communication [Normal Sclera/Conjunctiva] : normal sclera/conjunctiva [PERRL] : pupils equal round and reactive to light [EOMI] : extraocular movements intact [Normal Oropharynx] : the oropharynx was normal [Normal Outer Ear/Nose] : the outer ears and nose were normal in appearance [Normal TMs] : both tympanic membranes were normal [No JVD] : no jugular venous distention [Supple] : supple [No Lymphadenopathy] : no lymphadenopathy [Clear to Auscultation] : lungs were clear to auscultation bilaterally [No Respiratory Distress] : no respiratory distress  [Thyroid Normal, No Nodules] : the thyroid was normal and there were no nodules present [No Accessory Muscle Use] : no accessory muscle use [Regular Rhythm] : with a regular rhythm [Normal Rate] : normal rate  [No Murmur] : no murmur heard [No Carotid Bruits] : no carotid bruits [Normal S1, S2] : normal S1 and S2 [No Abdominal Bruit] : a ~M bruit was not heard ~T in the abdomen [No Varicosities] : no varicosities [Pedal Pulses Present] : the pedal pulses are present [No Extremity Clubbing/Cyanosis] : no extremity clubbing/cyanosis [No Edema] : there was no peripheral edema [Normal Appearance] : normal in appearance [No Palpable Aorta] : no palpable aorta [No Nipple Discharge] : no nipple discharge [Soft] : abdomen soft [No Axillary Lymphadenopathy] : no axillary lymphadenopathy [Non Tender] : non-tender [Non-distended] : non-distended [No Masses] : no abdominal mass palpated [No HSM] : no HSM [Normal Bowel Sounds] : normal bowel sounds [Normal Posterior Cervical Nodes] : no posterior cervical lymphadenopathy [No Hernias] : no hernias [No CVA Tenderness] : no CVA  tenderness [Normal Anterior Cervical Nodes] : no anterior cervical lymphadenopathy [No Spinal Tenderness] : no spinal tenderness [No Joint Swelling] : no joint swelling [Grossly Normal Strength/Tone] : grossly normal strength/tone [No Rash] : no rash [Normal Gait] : normal gait [Coordination Grossly Intact] : coordination grossly intact [No Focal Deficits] : no focal deficits [Deep Tendon Reflexes (DTR)] : deep tendon reflexes were 2+ and symmetric [Normal Affect] : the affect was normal [Normal Insight/Judgement] : insight and judgment were intact

## 2019-06-25 ENCOUNTER — MESSAGE (OUTPATIENT)
Age: 64
End: 2019-06-25

## 2019-06-25 ENCOUNTER — TRANSCRIPTION ENCOUNTER (OUTPATIENT)
Age: 64
End: 2019-06-25

## 2019-06-25 LAB
25(OH)D3 SERPL-MCNC: 36.1 NG/ML
ALBUMIN SERPL ELPH-MCNC: 4.8 G/DL
ALP BLD-CCNC: 94 U/L
ALT SERPL-CCNC: 16 U/L
ANION GAP SERPL CALC-SCNC: 14 MMOL/L
APPEARANCE: CLEAR
AST SERPL-CCNC: 21 U/L
BACTERIA: NEGATIVE
BASOPHILS # BLD AUTO: 0.05 K/UL
BASOPHILS NFR BLD AUTO: 0.6 %
BILIRUB SERPL-MCNC: 0.6 MG/DL
BILIRUBIN URINE: NEGATIVE
BLOOD URINE: NEGATIVE
BUN SERPL-MCNC: 19 MG/DL
CALCIUM SERPL-MCNC: 9.9 MG/DL
CHLORIDE SERPL-SCNC: 103 MMOL/L
CHOLEST SERPL-MCNC: 268 MG/DL
CHOLEST/HDLC SERPL: 4.7 RATIO
CO2 SERPL-SCNC: 25 MMOL/L
COLOR: YELLOW
CREAT SERPL-MCNC: 0.74 MG/DL
EOSINOPHIL # BLD AUTO: 0.1 K/UL
EOSINOPHIL NFR BLD AUTO: 1.2 %
ESTIMATED AVERAGE GLUCOSE: 111 MG/DL
GLUCOSE QUALITATIVE U: NEGATIVE
GLUCOSE SERPL-MCNC: 80 MG/DL
HBA1C MFR BLD HPLC: 5.5 %
HCT VFR BLD CALC: 45.1 %
HDLC SERPL-MCNC: 57 MG/DL
HGB BLD-MCNC: 13.5 G/DL
HYALINE CASTS: 3 /LPF
IMM GRANULOCYTES NFR BLD AUTO: 0.2 %
KETONES URINE: NEGATIVE
LDLC SERPL CALC-MCNC: 181 MG/DL
LEUKOCYTE ESTERASE URINE: NEGATIVE
LYMPHOCYTES # BLD AUTO: 3.49 K/UL
LYMPHOCYTES NFR BLD AUTO: 42.2 %
MAN DIFF?: NORMAL
MCHC RBC-ENTMCNC: 29.9 GM/DL
MCHC RBC-ENTMCNC: 30.1 PG
MCV RBC AUTO: 100.7 FL
MICROSCOPIC-UA: NORMAL
MONOCYTES # BLD AUTO: 0.59 K/UL
MONOCYTES NFR BLD AUTO: 7.1 %
NEUTROPHILS # BLD AUTO: 4.02 K/UL
NEUTROPHILS NFR BLD AUTO: 48.7 %
NITRITE URINE: NEGATIVE
PH URINE: 6
PLATELET # BLD AUTO: 311 K/UL
POTASSIUM SERPL-SCNC: 4.6 MMOL/L
PROT SERPL-MCNC: 7.7 G/DL
PROTEIN URINE: NORMAL
RBC # BLD: 4.48 M/UL
RBC # FLD: 12.3 %
RED BLOOD CELLS URINE: 1 /HPF
SODIUM SERPL-SCNC: 142 MMOL/L
SPECIFIC GRAVITY URINE: 1.03
SQUAMOUS EPITHELIAL CELLS: 2 /HPF
T4 FREE SERPL-MCNC: 1.1 NG/DL
TRIGL SERPL-MCNC: 152 MG/DL
TSH SERPL-ACNC: 1.57 UIU/ML
UROBILINOGEN URINE: NORMAL
WBC # FLD AUTO: 8.27 K/UL
WHITE BLOOD CELLS URINE: 1 /HPF

## 2019-06-25 NOTE — ADDENDUM
[FreeTextEntry1] : 6/25/19 - Spoke with patient at 11:50AM.  LDL remains high.  Patient willing to go back onto atorvastatin 10mg daily.  She will call for muscle pains or abdominal pains.  Follow-up in 3 months.  Other labs WNL.

## 2019-06-25 NOTE — ASSESSMENT
[FreeTextEntry1] : (1) HCM - discussed diet, exercise, weight maintenance.  Labs ordered in office as below.  Hepatitis C screening negative 4/10/17.  HIV testing offered to patient and patient declined.  Tdap UTD from 10/30/17.  Patient reports an influenza vaccination from Oct 2018.  She declines the Shingrix at this time.  Continue annual screening mammogram (and breast US) as well as Gyn visits as directed.  Patient had a colonoscopy 1/11/2012 with Dr. Mora, with 5 year follow-up recommended (reminder given to patient today).  She reports a paternal uncle with rectal cancer.  Patient reports that she has written advance directives and I asked her to forward a copy to me.\par \par (2) CV - ECG is NSR.  Patient has had intermittent palpitation, and has seen cardiology for this.  She uses Slo-Mg when needed.  Patient also has hyperlipidemia ( last year), and has been declining medication.  Lipids sent today.  I urged the patient to reconsider medication.\par \par (3) Vitamin D insufficiency - check level today.\par \par (4) GI - patient with improved reflux and managing it off medication at this point.\par \par (5) Allergy - patient saw Dr. Boxer's in 2017.  Patient not found to have specific allergies on testing, although she is staying off the PPI's and H2 blockers.

## 2019-06-25 NOTE — HEALTH RISK ASSESSMENT
[Very Good] : ~his/her~  mood as very good [No falls in past year] : Patient reported no falls in the past year [0] : 2) Feeling down, depressed, or hopeless: Not at all (0) [Patient reported colonoscopy was normal] : Patient reported colonoscopy was normal [Patient reported mammogram was normal] : Patient reported mammogram was normal [Patient reported PAP Smear was normal] : Patient reported PAP Smear was normal [HIV test declined] : HIV test declined [None] : None [With Family] : lives with family [Employed] : employed [] :  [Sexually Active] : sexually active [Feels Safe at Home] : Feels safe at home [Fully functional (bathing, dressing, toileting, transferring, walking, feeding)] : Fully functional (bathing, dressing, toileting, transferring, walking, feeding) [Fully functional (using the telephone, shopping, preparing meals, housekeeping, doing laundry, using] : Fully functional and needs no help or supervision to perform IADLs (using the telephone, shopping, preparing meals, housekeeping, doing laundry, using transportation, managing medications and managing finances) [Smoke Detector] : smoke detector [Carbon Monoxide Detector] : carbon monoxide detector [Seat Belt] :  uses seat belt [Sunscreen] : uses sunscreen [With Patient/Caregiver] : With Patient/Caregiver [] : No [de-identified] : Walks for 45min, up to 3x/week. [MLT1Joicw] : 0 [de-identified] : Breakfast - protein cereal, almond milk, egg with avocado, piece of toast.  Apple for snack at school.  Lunch - coconut yogurt with banana, almonds, cereal, salad, sometimes with protein.  Dinner - salad, fish or chicken, vegetables  cucumbers, lettuce, olives, avocado , steamed vegetables.  Avoids red meat.  Occasional cookie [Change in mental status noted] : No change in mental status noted [Language] : denies difficulty with language [Handling Complex Tasks] : denies difficulty handling complex tasks [Behavior] : denies difficulty with behavior [Reasoning] : denies difficulty with reasoning [High Risk Behavior] : no high risk behavior [Reports changes in hearing] : Reports no changes in hearing [Reports changes in dental health] : Reports no changes in dental health [MammogramDate] : 6/2017 [Reports changes in vision] : Reports no changes in vision [MammogramComments] : Normal - [facility]  [phone], [facility] [PapSmearDate] : 07/18 [PapSmearComments] : Dr. Nikkie Norton [BoneDensityComments] : New Endocrinologist (? name, formerly saw Dr. Franklin).  Improved.  Not on treatment. [BoneDensityDate] : 12/18 [ColonoscopyDate] : 1/11/2012 [HepatitisCDate] : 4/10/17 [ColonoscopyComments] : Dr. Boyer.  No polyps. [HepatitisCComments] : Negative [de-identified] : No h/o STDs. [de-identified] : Drives a car. 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 Dr. Cedric Tejada [FreeTextEntry4] : Patient reports having a written Health Care Proxy and/or Living Will and will forward a copy. [AdvancecareDate] : 05/31/2019

## 2019-06-25 NOTE — HISTORY OF PRESENT ILLNESS
[Pregnancy History] : pregnancy history: [Total Preg ___] : [unfilled] [Full Term ___] : [unfilled] [Premature ___] : [unfilled] [Abortions ___] : [unfilled] [Living ___] : [unfilled] [AB Induced ___] : elective abortions: [unfilled] [AB Spont ___] : miscarriages: [unfilled] [FreeTextEntry1] : Patient had a follow-up bone density showing osteoporosis.  She had a visit with an Endocrinologist (Dr. Franklin), who is performing additional laboratory work.  Patient may consider Prolia. [de-identified] : Patient presents for a follow-up annual physical.

## 2019-08-30 ENCOUNTER — APPOINTMENT (OUTPATIENT)
Dept: INTERNAL MEDICINE | Facility: CLINIC | Age: 64
End: 2019-08-30
Payer: COMMERCIAL

## 2019-08-30 VITALS
OXYGEN SATURATION: 96 % | HEART RATE: 85 BPM | WEIGHT: 152.6 LBS | SYSTOLIC BLOOD PRESSURE: 130 MMHG | BODY MASS INDEX: 28.83 KG/M2 | TEMPERATURE: 97.9 F | DIASTOLIC BLOOD PRESSURE: 70 MMHG

## 2019-08-30 DIAGNOSIS — R10.11 RIGHT UPPER QUADRANT PAIN: ICD-10-CM

## 2019-08-30 DIAGNOSIS — K52.9 NONINFECTIVE GASTROENTERITIS AND COLITIS, UNSPECIFIED: ICD-10-CM

## 2019-08-30 PROCEDURE — 99214 OFFICE O/P EST MOD 30 MIN: CPT

## 2019-08-30 NOTE — PHYSICAL EXAM
[Normal] : normal rate, regular rhythm, normal S1 and S2 and no murmur heard [No Edema] : there was no peripheral edema [Soft] : abdomen soft [Non-distended] : non-distended [No Masses] : no abdominal mass palpated [No HSM] : no HSM [Normal Bowel Sounds] : normal bowel sounds [No Rash] : no rash [Normal Gait] : normal gait [Normal Affect] : the affect was normal [Normal Mood] : the mood was normal [de-identified] : mild RUQ tenderness

## 2019-08-30 NOTE — HISTORY OF PRESENT ILLNESS
[FreeTextEntry8] : Patient comes for an acute visit. \par \par She is here for evaluation of abdominal pain.\par \par She had abdominal pain and diarrhea 1 month ago for a few days. Last Friday, she ate some fish and subsequently developed similar stomach pain and slight loose stool. She then felt better for a few days however two nights ago, she developed abdominal pain located in epigastric region. She had associated loose stool, more soft and not so watery. She felt gassy and nauseous. She had some chills but no reported fever. Appetite seems okay but she is afraid to eat. She is drinking plenty of water. No recent exotic foods. No recent travel or sick contacts. She has not tried OTC medications. She is leaving for trip to [facility] on 9/9. \par

## 2019-08-30 NOTE — PLAN
[FreeTextEntry1] : Suspect likely acute gastroenteritis. Suggest rest, increase fluids, low fat diet / BRAT - advance as tolerated.\par \par RUQ pain - send for abdominal ultrasound. Possible cholelithiasis. Eat low fat diet.\par \par Leaving for trip to [place] next month. Rx given for Z-mateus in case develops traveler's diarrhea or URI.

## 2019-08-30 NOTE — REVIEW OF SYSTEMS
[Abdominal Pain] : abdominal pain [Diarrhea] : diarrhea [Negative] : Psychiatric [Nausea] : no nausea [Constipation] : no constipation [Vomiting] : no vomiting [Melena] : no melena

## 2019-09-04 ENCOUNTER — APPOINTMENT (OUTPATIENT)
Dept: ULTRASOUND IMAGING | Facility: CLINIC | Age: 64
End: 2019-09-04

## 2019-09-04 ENCOUNTER — APPOINTMENT (OUTPATIENT)
Dept: ULTRASOUND IMAGING | Facility: IMAGING CENTER | Age: 64
End: 2019-09-04

## 2019-09-04 ENCOUNTER — OUTPATIENT (OUTPATIENT)
Dept: OUTPATIENT SERVICES | Facility: HOSPITAL | Age: 64
LOS: 1 days | End: 2019-09-04
Payer: COMMERCIAL

## 2019-09-04 DIAGNOSIS — R10.11 RIGHT UPPER QUADRANT PAIN: ICD-10-CM

## 2019-09-04 PROCEDURE — 76700 US EXAM ABDOM COMPLETE: CPT

## 2019-09-04 PROCEDURE — 76700 US EXAM ABDOM COMPLETE: CPT | Mod: 26

## 2019-09-06 ENCOUNTER — MESSAGE (OUTPATIENT)
Age: 64
End: 2019-09-06

## 2019-11-18 ENCOUNTER — APPOINTMENT (OUTPATIENT)
Dept: PEDIATRIC MEDICAL GENETICS | Facility: CLINIC | Age: 64
End: 2019-11-18

## 2019-11-18 ENCOUNTER — APPOINTMENT (OUTPATIENT)
Dept: PEDIATRIC MEDICAL GENETICS | Facility: CLINIC | Age: 64
End: 2019-11-18
Payer: COMMERCIAL

## 2019-11-18 PROCEDURE — 99201 OFFICE OUTPATIENT NEW 10 MINUTES: CPT

## 2020-01-12 ENCOUNTER — TRANSCRIPTION ENCOUNTER (OUTPATIENT)
Age: 65
End: 2020-01-12

## 2020-01-30 ENCOUNTER — EMERGENCY (EMERGENCY)
Facility: HOSPITAL | Age: 65
LOS: 1 days | Discharge: HOME | End: 2020-01-30
Attending: EMERGENCY MEDICINE
Payer: COMMERCIAL

## 2020-01-30 VITALS
OXYGEN SATURATION: 97 % | TEMPERATURE: 98 F | DIASTOLIC BLOOD PRESSURE: 83 MMHG | RESPIRATION RATE: 17 BRPM | HEART RATE: 81 BPM | SYSTOLIC BLOOD PRESSURE: 117 MMHG

## 2020-01-30 VITALS
WEIGHT: 156.97 LBS | RESPIRATION RATE: 19 BRPM | HEIGHT: 61 IN | DIASTOLIC BLOOD PRESSURE: 89 MMHG | SYSTOLIC BLOOD PRESSURE: 140 MMHG | TEMPERATURE: 98 F | HEART RATE: 99 BPM | OXYGEN SATURATION: 97 %

## 2020-01-30 LAB
ALBUMIN SERPL ELPH-MCNC: 4.3 G/DL — SIGNIFICANT CHANGE UP (ref 3.3–5)
ALP SERPL-CCNC: 89 U/L — SIGNIFICANT CHANGE UP (ref 40–120)
ALT FLD-CCNC: 10 U/L — SIGNIFICANT CHANGE UP (ref 10–45)
ANION GAP SERPL CALC-SCNC: 14 MMOL/L — SIGNIFICANT CHANGE UP (ref 5–17)
APPEARANCE UR: CLEAR — SIGNIFICANT CHANGE UP
AST SERPL-CCNC: 15 U/L — SIGNIFICANT CHANGE UP (ref 10–40)
BACTERIA # UR AUTO: NEGATIVE — SIGNIFICANT CHANGE UP
BILIRUB SERPL-MCNC: 0.4 MG/DL — SIGNIFICANT CHANGE UP (ref 0.2–1.2)
BILIRUB UR-MCNC: NEGATIVE — SIGNIFICANT CHANGE UP
BUN SERPL-MCNC: 22 MG/DL — SIGNIFICANT CHANGE UP (ref 7–23)
CALCIUM SERPL-MCNC: 9.7 MG/DL — SIGNIFICANT CHANGE UP (ref 8.4–10.5)
CHLORIDE SERPL-SCNC: 103 MMOL/L — SIGNIFICANT CHANGE UP (ref 96–108)
CO2 SERPL-SCNC: 22 MMOL/L — SIGNIFICANT CHANGE UP (ref 22–31)
COLOR SPEC: SIGNIFICANT CHANGE UP
CREAT SERPL-MCNC: 0.61 MG/DL — SIGNIFICANT CHANGE UP (ref 0.5–1.3)
DIFF PNL FLD: NEGATIVE — SIGNIFICANT CHANGE UP
EPI CELLS # UR: 1 /HPF — SIGNIFICANT CHANGE UP
GLUCOSE SERPL-MCNC: 113 MG/DL — HIGH (ref 70–99)
GLUCOSE UR QL: NEGATIVE — SIGNIFICANT CHANGE UP
HCT VFR BLD CALC: 41.2 % — SIGNIFICANT CHANGE UP (ref 34.5–45)
HGB BLD-MCNC: 13.1 G/DL — SIGNIFICANT CHANGE UP (ref 11.5–15.5)
HYALINE CASTS # UR AUTO: 1 /LPF — SIGNIFICANT CHANGE UP (ref 0–2)
KETONES UR-MCNC: NEGATIVE — SIGNIFICANT CHANGE UP
LEUKOCYTE ESTERASE UR-ACNC: NEGATIVE — SIGNIFICANT CHANGE UP
MAGNESIUM SERPL-MCNC: 2.1 MG/DL — SIGNIFICANT CHANGE UP (ref 1.6–2.6)
MCHC RBC-ENTMCNC: 30.2 PG — SIGNIFICANT CHANGE UP (ref 27–34)
MCHC RBC-ENTMCNC: 31.8 GM/DL — LOW (ref 32–36)
MCV RBC AUTO: 94.9 FL — SIGNIFICANT CHANGE UP (ref 80–100)
NITRITE UR-MCNC: NEGATIVE — SIGNIFICANT CHANGE UP
NRBC # BLD: 0 /100 WBCS — SIGNIFICANT CHANGE UP (ref 0–0)
PH UR: 6 — SIGNIFICANT CHANGE UP (ref 5–8)
PLATELET # BLD AUTO: 279 K/UL — SIGNIFICANT CHANGE UP (ref 150–400)
POTASSIUM SERPL-MCNC: 4 MMOL/L — SIGNIFICANT CHANGE UP (ref 3.5–5.3)
POTASSIUM SERPL-SCNC: 4 MMOL/L — SIGNIFICANT CHANGE UP (ref 3.5–5.3)
PROT SERPL-MCNC: 7.6 G/DL — SIGNIFICANT CHANGE UP (ref 6–8.3)
PROT UR-MCNC: NEGATIVE — SIGNIFICANT CHANGE UP
RBC # BLD: 4.34 M/UL — SIGNIFICANT CHANGE UP (ref 3.8–5.2)
RBC # FLD: 11.9 % — SIGNIFICANT CHANGE UP (ref 10.3–14.5)
RBC CASTS # UR COMP ASSIST: 1 /HPF — SIGNIFICANT CHANGE UP (ref 0–4)
SODIUM SERPL-SCNC: 139 MMOL/L — SIGNIFICANT CHANGE UP (ref 135–145)
SP GR SPEC: 1.02 — SIGNIFICANT CHANGE UP (ref 1.01–1.02)
TROPONIN T, HIGH SENSITIVITY RESULT: <6 NG/L — SIGNIFICANT CHANGE UP (ref 0–51)
TSH SERPL-MCNC: 1.2 UIU/ML — SIGNIFICANT CHANGE UP (ref 0.27–4.2)
UROBILINOGEN FLD QL: NEGATIVE — SIGNIFICANT CHANGE UP
WBC # BLD: 12.45 K/UL — HIGH (ref 3.8–10.5)
WBC # FLD AUTO: 12.45 K/UL — HIGH (ref 3.8–10.5)
WBC UR QL: 2 /HPF — SIGNIFICANT CHANGE UP (ref 0–5)

## 2020-01-30 PROCEDURE — G0378: CPT

## 2020-01-30 PROCEDURE — 93005 ELECTROCARDIOGRAM TRACING: CPT

## 2020-01-30 PROCEDURE — 83735 ASSAY OF MAGNESIUM: CPT

## 2020-01-30 PROCEDURE — 84443 ASSAY THYROID STIM HORMONE: CPT

## 2020-01-30 PROCEDURE — 99284 EMERGENCY DEPT VISIT MOD MDM: CPT | Mod: 25

## 2020-01-30 PROCEDURE — 99218: CPT

## 2020-01-30 PROCEDURE — 93010 ELECTROCARDIOGRAM REPORT: CPT

## 2020-01-30 PROCEDURE — 80053 COMPREHEN METABOLIC PANEL: CPT

## 2020-01-30 PROCEDURE — 85027 COMPLETE CBC AUTOMATED: CPT

## 2020-01-30 PROCEDURE — 84484 ASSAY OF TROPONIN QUANT: CPT

## 2020-01-30 PROCEDURE — 87086 URINE CULTURE/COLONY COUNT: CPT

## 2020-01-30 PROCEDURE — 81001 URINALYSIS AUTO W/SCOPE: CPT

## 2020-01-30 RX ORDER — OMEPRAZOLE 10 MG/1
0 CAPSULE, DELAYED RELEASE ORAL
Qty: 0 | Refills: 0 | DISCHARGE

## 2020-01-30 RX ORDER — SODIUM CHLORIDE 9 MG/ML
1000 INJECTION INTRAMUSCULAR; INTRAVENOUS; SUBCUTANEOUS ONCE
Refills: 0 | Status: COMPLETED | OUTPATIENT
Start: 2020-01-30 | End: 2020-01-30

## 2020-01-30 RX ORDER — ACETAMINOPHEN 500 MG
650 TABLET ORAL ONCE
Refills: 0 | Status: COMPLETED | OUTPATIENT
Start: 2020-01-30 | End: 2020-01-30

## 2020-01-30 RX ADMIN — SODIUM CHLORIDE 1000 MILLILITER(S): 9 INJECTION INTRAMUSCULAR; INTRAVENOUS; SUBCUTANEOUS at 02:38

## 2020-01-30 NOTE — ED CDU PROVIDER INITIAL DAY NOTE - OBJECTIVE STATEMENT
65y/o F with PMH of HLD and GERD c/o "drunken feeling" described as lightheadedness, palpitations and feeling like she's going pass out. Pt took a "dropper full" of CBD oil around 4PM for neck pain which is normally more than she takes. Pt then took an Aleve at 6PM which she states she does not usually take when she takes CBD oil. Sx started at 10PM and having been improving. Palpitations were described as racing heart beat. + chills. No CP or SOB. pt notes neck pain did improve. denies any fever, flu like symptoms, problems walking, problems going to the bathroom.   In ED, WBC 12.45, trop negative, other labs unremarkable, TSH pending. pt notes all symptoms resolved aside from the palpitations. pt sent to CDU for continued telemetry monitoring and for holter prior to discharge.     PMD/cardio Dr. Joel Goldberg

## 2020-01-30 NOTE — ED ADULT TRIAGE NOTE - CHIEF COMPLAINT QUOTE
ingested CBD oil at about 4: 30 pm to treat neck pain, felt palpitations later about 10 pm, felt "funny" and felt shivering";  also took alleve at 8 pm

## 2020-01-30 NOTE — ED CDU PROVIDER INITIAL DAY NOTE - PROGRESS NOTE DETAILS
CDU NOTE MCKENZIE Valles: VSS NAD. Patient is resting comfortably and is without any complaints. No events on tele. Pt still w/ palpitations but overall feels improved. Will discuss inpt vs outpt holter w./ her cardiologist Joel Goldberg Spoke to Dr. Goldberg in regards to patient who states that pt can be d/c home and will see her in office to determine need to have holter placed   Daysi Valles PA-C

## 2020-01-30 NOTE — ED PROVIDER NOTE - PHYSICAL EXAMINATION
CONSTITUTIONAL: Well appearing, well nourished, awake, alert, oriented to person, place, time/situation and in no apparent distress  ENMT: Airway patent  EYES: Clear bilaterally  CARDIAC: Normal rate, regular rhythm.  Heart sounds S1, S2.  No murmurs, rubs or gallops  RESPIRATORY: Breath sounds clear and equal bilaterally.  ABDOMEN: Abdomen soft, non-tender, no guarding  MUSCULOSKELETAL: Spine appears normal, no deformities, equal active FROM bilaterally  NEUROLOGIC: CN II-XII grossly intact, moves all extremities without lateralization  SKIN: Exposed skin normal color for race, warm, dry and intact

## 2020-01-30 NOTE — ED ADULT NURSE NOTE - NSIMPLEMENTINTERV_GEN_ALL_ED
Implemented All Universal Safety Interventions:  [place] to call system. Call bell, personal items and telephone within reach. Instruct patient to call for assistance. Room bathroom lighting operational. Non-slip footwear when patient is off stretcher. Physically safe environment: no spills, clutter or unnecessary equipment. Stretcher in lowest position, wheels locked, appropriate side rails in place.

## 2020-01-30 NOTE — ED CDU PROVIDER DISPOSITION NOTE - ATTENDING CONTRIBUTION TO CARE
Patient seen and evaluated in CDU.  Labs unremarkable, ekg nonactionable, no events on tele.  Patient has significant improvement of palpitations.  She has intermittent very mild symptoms but no associated lightheadedness, CP, dizziness.  Has  been ambulating in CDU without problems.  Symptoms seem likely 2/2 extra dose CBD taken last night (symptoms were a/w feeling "drunk" and dizzy); she states no thc or other drug use or etoh use last night.  Very unlikely acute coronary syndrome (trop <6, pt had recent normal stress); no clinical e/o PE, dissection.  Case d/w Dr. Goldberg (cardiology), agrees holter/loop can be deferred for outpt w/u.  Patient comfortable c d/c plan, will abstain from CBD oil, and f/u c Dr. Goldberg.  --BMM

## 2020-01-30 NOTE — ED CDU PROVIDER DISPOSITION NOTE - CLINICAL COURSE
65y/o F with PMH of HLD and GERD c/o "drunken feeling" described as lightheadedness, palpitations and feeling like she's going pass out. Pt took a "dropper full" of CBD oil around 4PM for neck pain which is normally more than she takes. Pt then took an Aleve at 6PM which she states she does not usually take when she takes CBD oil. Sx started at 10PM and having been improving. Palpitations were described as racing heart beat. + chills. No CP or SOB. pt notes neck pain did improve. denies any fever, flu like symptoms, problems walking, problems going to the bathroom.   In ED, WBC 12.45, trop negative, other labs unremarkable, TSH pending. pt notes all symptoms resolved aside from the palpitations. pt sent to CDU for continued telemetry monitoring and for holter prior to discharge.   In CDU, __________ 65y/o F with PMH of HLD and GERD c/o "drunken feeling" described as lightheadedness, palpitations and feeling like she's going pass out. Pt took a "dropper full" of CBD oil around 4PM for neck pain which is normally more than she takes. Pt then took an Aleve at 6PM which she states she does not usually take when she takes CBD oil. Sx started at 10PM and having been improving. Palpitations were described as racing heart beat. + chills. No CP or SOB. pt notes neck pain did improve. denies any fever, flu like symptoms, problems walking, problems going to the bathroom.   In ED, WBC 12.45, trop negative, other labs unremarkable, TSH pending. pt notes all symptoms resolved aside from the palpitations. pt sent to CDU for continued telemetry monitoring and for holter prior to discharge.   In CDU, pt did well overnight and in am overall felt improved. Spoke to Dr. Goldberg in regards to patient who states that pt can be d/c home and will see her in office to determine need to have holter placed. Pt seen and evaluated by Dr. Vazquez who agreed w/ d/c home

## 2020-01-30 NOTE — ED ADULT NURSE NOTE - OBJECTIVE STATEMENT
63 y/o female AAOx3 presents to ED for palpitations. As per pt, pt took dropper full of CBD oil around 4pm for chronic neck pain, then took Alleve at 6pm and she believes the combination of the medications caused her to develop palpitations and "funny feeling". Pt states she felt warm and then shivers and was associated with dizziness. Upon arrival to [facility], pt placed on cardiac monitor, NSR with HR in the 80s, EKG completed. C/o of palpitations and heart racing but denies any CP. Non labored respirations, no use of accessory muscles or cough. +ROMx4 extremities, +strong hand  b/l, no numbness, tingling or weakness. Denies n/v/d, SOB, urinary complaints. Ambulating w/ steady gait, safety and comfort maintained, no acute distress noted at this time.

## 2020-01-30 NOTE — ED CDU PROVIDER INITIAL DAY NOTE - ATTENDING CONTRIBUTION TO CARE
Please see ED provider note. Pt seen by me in ED overnight. Under my care until 7AM 1/30/20. MASOOD.

## 2020-01-30 NOTE — ED CDU PROVIDER DISPOSITION NOTE - PATIENT PORTAL LINK FT
You can access the FollowMyHealth Patient Portal offered by [facility] by registering at the following website: [email or web address]. By joining [employer]’s FollowMyHealth portal, you will also be able to view your health information using other applications (apps) compatible with our system.

## 2020-01-30 NOTE — ED PROVIDER NOTE - PROGRESS NOTE DETAILS
Labs non-actionable. Pt continues to have sensation of palpitations. No arrythmia noted on monitor. Will place in CDU for tele monitor and possible Holter in [place].

## 2020-01-30 NOTE — ED ADULT NURSE REASSESSMENT NOTE - NS ED NURSE REASSESS COMMENT FT1
Pt received from ABIGAIL Lock. Pt oriented to CDU & plan of care was discussed. Pt A&O x 3. Pt in CDU for tele monitoring and possible Holter in AM, cardiology c/s. .Pt denies any dizziness or lightheadedness at this time. Pt still c/o palpitations, pt states it much better now then it was before. Pt on a cardiac monitor in NSR, HR in 80's. Pt resting in bed. Safety & comfort measures maintained. Call bell in reach. Will continue to monitor.

## 2020-01-30 NOTE — ED CDU PROVIDER DISPOSITION NOTE - NSFOLLOWUPINSTRUCTIONS_ED_ALL_ED_FT
1. Stay hydrated.  2. Follow up with your PMD/cardiologist within 48-72 hours. Show copies of your reports given to you.   3. Worsening or continued chest pain, shortness of breath, weakness, return to ED. 1. Stay hydrated and continue any home medications   2. Follow up with your PMD/cardiologist within 48-72 hours. Dr. Goldberg is aware and is expecting a call to make an appointment. Show copies of your reports given to you.   3. Worsening or continued palpitations, dizziness, chest pain, shortness of breath, weakness, return to ED.

## 2020-01-30 NOTE — ED PROVIDER NOTE - OBJECTIVE STATEMENT
64F PMH of HL p/w c/o "drunken feeling" described as lightheadedness, palpitations and feeling like I'm gonna pass out. Pt took a "dropper full" of CBD oil around 4PM for neck muscle pain which is normally more than she takes. Pt took an Aleve at 6PM. Sx started at 10PM and having been improving. Palpitations were described as racing heart beat. No CP or SOB. Pt feels mental state is back to baseline.

## 2020-01-31 LAB
CULTURE RESULTS: SIGNIFICANT CHANGE UP
SPECIMEN SOURCE: SIGNIFICANT CHANGE UP

## 2020-02-01 NOTE — ED POST DISCHARGE NOTE - DETAILS
2/1: LVM for patient that I had test results, gave number for callback. Will call back tomorrow to determine if symptomatic to assess need for abx. - Juan David Pete PA-C

## 2020-02-05 PROBLEM — E78.5 HYPERLIPIDEMIA, UNSPECIFIED: Chronic | Status: ACTIVE | Noted: 2020-01-30

## 2020-02-14 ENCOUNTER — APPOINTMENT (OUTPATIENT)
Dept: ORTHOPEDIC SURGERY | Facility: CLINIC | Age: 65
End: 2020-02-14
Payer: COMMERCIAL

## 2020-02-14 VITALS
SYSTOLIC BLOOD PRESSURE: 116 MMHG | HEIGHT: 62 IN | WEIGHT: 157 LBS | BODY MASS INDEX: 28.89 KG/M2 | DIASTOLIC BLOOD PRESSURE: 70 MMHG

## 2020-02-14 DIAGNOSIS — S83.511A SPRAIN OF ANTERIOR CRUCIATE LIGAMENT OF RIGHT KNEE, INITIAL ENCOUNTER: ICD-10-CM

## 2020-02-14 PROCEDURE — 99203 OFFICE O/P NEW LOW 30 MIN: CPT

## 2020-02-14 RX ORDER — AZITHROMYCIN 250 MG/1
250 TABLET, FILM COATED ORAL
Qty: 1 | Refills: 0 | Status: DISCONTINUED | COMMUNITY
Start: 2019-08-30 | End: 2020-02-14

## 2020-02-14 RX ORDER — ATORVASTATIN CALCIUM 10 MG/1
10 TABLET, FILM COATED ORAL
Qty: 90 | Refills: 1 | Status: DISCONTINUED | COMMUNITY
Start: 2019-06-25 | End: 2020-02-14

## 2020-02-14 RX ORDER — EPINEPHRINE 0.3 MG/.3ML
0.3 INJECTION INTRAMUSCULAR
Qty: 1 | Refills: 0 | Status: DISCONTINUED | COMMUNITY
Start: 2017-09-29 | End: 2020-02-14

## 2020-02-27 NOTE — HISTORY OF PRESENT ILLNESS
[de-identified] : Emily Rae is a 65 yo woman with right knee issues for the last several months. She reports progressive discomfort and stiffness in her right knee. she has dull, achy type pain than increases when kneeling. She was seen by a local orthopedist and was sent for an MRI which shows an ACL tear. She denies any swelling, instability or locking. She fractured her pelvis is 2017 and had a prolonged course of PT.

## 2020-02-27 NOTE — DISCUSSION/SUMMARY
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 She does not have any issues with instability and denies any locking symptoms. She will begin a course of supervised PT to improve her strength. She will return on an as needed basis. ALl questions were answered. She will call if any issues arise.

## 2020-02-27 NOTE — PHYSICAL EXAM
[de-identified] : The patient is a well developed, well nourished female in no apparent distress. She is alert and oriented X 3 with a pleasant mood and appropriate affect.\par \par On physical examination of the right knee, there is full range of motion. The patient walks with a normal gait and stands in neutral alignment. There is no effusion. No warmth or erythema is noted. The patella is tender to palpation medially and laterally. There is patellofemoral crepitus noted. The apprehension and grind tests are negative. The extensor mechanism is intact. There is no joint line tenderness. The Marco A sign is absent. The Lachman and pivot shift tests are positive. There is no varus or valgus laxity at 0 or 30 degrees. No posterolateral or anteromedial laxity is noted. No masses are palpable. No other soft tissue or bony tenderness is noted. There is some tenderness noted on palpation of the IT band. Quadriceps weakness is noted. Neurovascular function is intact.   [de-identified] : MRi of the right knee shows a chronic tear of the ACL with a displaced lateral meniscus tear

## 2020-02-27 NOTE — END OF VISIT
[FreeTextEntry3] : All medical record entries made by MCKENZIE Baca, acting as a scribe for this encounter under the direction of Ruben Washington MD . I have reviewed the chart and agree that the record accurately reflects my personal performance of the history, physical exam, assessment and plan. I have also personally directed, reviewed, and agreed with the chart.

## 2020-05-21 ENCOUNTER — NON-APPOINTMENT (OUTPATIENT)
Age: 65
End: 2020-05-21

## 2020-07-01 ENCOUNTER — OUTPATIENT (OUTPATIENT)
Dept: OUTPATIENT SERVICES | Facility: HOSPITAL | Age: 65
LOS: 1 days | End: 2020-07-01
Payer: COMMERCIAL

## 2020-07-01 ENCOUNTER — APPOINTMENT (OUTPATIENT)
Dept: CT IMAGING | Facility: CLINIC | Age: 65
End: 2020-07-01
Payer: COMMERCIAL

## 2020-07-01 DIAGNOSIS — R10.11 RIGHT UPPER QUADRANT PAIN: ICD-10-CM

## 2020-07-01 PROCEDURE — 74177 CT ABD & PELVIS W/CONTRAST: CPT

## 2020-07-01 PROCEDURE — 82565 ASSAY OF CREATININE: CPT

## 2020-07-01 PROCEDURE — 74177 CT ABD & PELVIS W/CONTRAST: CPT | Mod: 26

## 2020-07-06 ENCOUNTER — TRANSCRIPTION ENCOUNTER (OUTPATIENT)
Age: 65
End: 2020-07-06

## 2020-07-06 DIAGNOSIS — K63.89 OTHER SPECIFIED DISEASES OF INTESTINE: ICD-10-CM

## 2020-07-07 ENCOUNTER — EMERGENCY (EMERGENCY)
Facility: HOSPITAL | Age: 65
LOS: 1 days | Discharge: HOME | End: 2020-07-07
Attending: EMERGENCY MEDICINE | Admitting: EMERGENCY MEDICINE
Payer: COMMERCIAL

## 2020-07-07 VITALS
WEIGHT: 154.98 LBS | RESPIRATION RATE: 17 BRPM | OXYGEN SATURATION: 99 % | HEART RATE: 75 BPM | HEIGHT: 62 IN | TEMPERATURE: 97 F | SYSTOLIC BLOOD PRESSURE: 129 MMHG | DIASTOLIC BLOOD PRESSURE: 78 MMHG

## 2020-07-07 DIAGNOSIS — M25.532 PAIN IN LEFT WRIST: ICD-10-CM

## 2020-07-07 PROCEDURE — 70486 CT MAXILLOFACIAL W/O DYE: CPT | Mod: 26

## 2020-07-07 PROCEDURE — 72125 CT NECK SPINE W/O DYE: CPT

## 2020-07-07 PROCEDURE — 70450 CT HEAD/BRAIN W/O DYE: CPT | Mod: 26

## 2020-07-07 PROCEDURE — 70450 CT HEAD/BRAIN W/O DYE: CPT

## 2020-07-07 PROCEDURE — 72125 CT NECK SPINE W/O DYE: CPT | Mod: 26

## 2020-07-07 PROCEDURE — 99284 EMERGENCY DEPT VISIT MOD MDM: CPT | Mod: 25

## 2020-07-07 PROCEDURE — 70486 CT MAXILLOFACIAL W/O DYE: CPT

## 2020-07-07 PROCEDURE — 99284 EMERGENCY DEPT VISIT MOD MDM: CPT

## 2020-07-07 NOTE — ED ADULT NURSE NOTE - NSIMPLEMENTINTERV_GEN_ALL_ED
Implemented All Fall Risk Interventions:  [place] to call system. Call bell, personal items and telephone within reach. Instruct patient to call for assistance. Room bathroom lighting operational. Non-slip footwear when patient is off stretcher. Physically safe environment: no spills, clutter or unnecessary equipment. Stretcher in lowest position, wheels locked, appropriate side rails in place. Provide visual cue, wrist band, yellow gown, etc. Monitor gait and stability. Monitor for mental status changes and reorient to person, place, and time. Review medications for side effects contributing to fall risk. Reinforce activity limits and safety measures with patient and family.
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
## 2020-07-07 NOTE — ED PROVIDER NOTE - CARE PLAN
Principal Discharge DX:	CHI (closed head injury), initial encounter  Secondary Diagnosis:	Neck strain, initial encounter  Secondary Diagnosis:	Wrist sprain, right, subsequent encounter  Secondary Diagnosis:	Wrist sprain, left, subsequent encounter

## 2020-07-07 NOTE — ED PROVIDER NOTE - NSFOLLOWUPINSTRUCTIONS_ED_ALL_ED_FT
Follow up with your PMD within 24-48 hrs hours.      Rest, Take Tylenol 650mg every 4-6 hours as needed for pain . Refrain from using Advil, Aleve, Naprosyn, Ibuprofen or Aspirin.     You may have a headache associated with nausea and lightheadedness in the next few hours/days. This is called a concussion and does not warrant return to the Emergency department unless you develop significant worsening of pain, profuse vomiting, dizziness, changes in vision, difficulty walking/speaking, weakness or numbness to your extremities.     A sprain is a stretch or tear in one of the tough, fiber-like tissues (ligaments) in your body. This is caused by an injury to the area such as a twisting mechanism. Symptoms include pain, swelling, or bruising. Rest that area over the next several days and slowly resume activity when tolerated. Ice can help with swelling and pain. If the pain does not improve over the next 1-2 weeks, call and arrange follow up with an orthopedist. We have recommended one for you.     SEEK IMMEDIATE MEDICAL CARE IF YOU HAVE ANY OF THE FOLLOWING SYMPTOMS: worsening pain, inability to move that body part, numbness or tingling.     Worsening or new concerning symptoms return to the emergency department.

## 2020-07-07 NOTE — ED PROVIDER NOTE - CARE PROVIDER_API CALL
Shane Newman  [facility]  [address] [place] [address]  Phone: [phone]  Fax: [phone]  Follow Up Time:

## 2020-07-07 NOTE — ED PROVIDER NOTE - PHYSICAL EXAMINATION
Upper lip and face lacerations (s/p repair) and abrasions.  No dislocation of jaw.   PERRL. No midline cervical spine tenderness. ROM intact  Pt shows that her pain is right paracervical and trapezius.   Left wrist is within a velcro volar splint. The right wrist has swelling but no snuff box or focal tenderness. No splint.

## 2020-07-07 NOTE — ED ADULT TRIAGE NOTE - CHIEF COMPLAINT QUOTE
Pt ambulatory from home c/o headache, b/l arm pain, s/p fall yesterday. Pt states she went to her plastic surgeon and got her lips repaired. Denies LOC, nausea, vomiting.

## 2020-07-07 NOTE — ED PROVIDER NOTE - PATIENT PORTAL LINK FT
You can access the FollowMyHealth Patient Portal offered by [facility] by registering at the following website: [email or web address]. By joining [facility]’s FollowMyHealth portal, you will also be able to view your health information using other applications (apps) compatible with our system.
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
## 2020-07-07 NOTE — ED ADULT NURSE NOTE - OBJECTIVE STATEMENT
Pt arrives to ER ambulatory reporting that she tripped and fell yesterday landing on her face, bilateral wrists and bilateral knees. Pt states she went to urgent care and also had her lip repaired by Dr Leon. C/o pain to bilateral wrists 8/10 and also reports pain to temporal area of head on right side thought she denies striking her head on the floor. Pt denies LOC on initial fall. Minor abrasions to bilateral knees and to nose/lip area. Denies chest pain, denies sob, neg fever/chills.

## 2020-07-07 NOTE — ED PROVIDER NOTE - SECONDARY DIAGNOSIS.
Neck strain, initial encounter Wrist sprain, right, subsequent encounter Wrist sprain, left, subsequent encounter

## 2020-07-13 ENCOUNTER — APPOINTMENT (OUTPATIENT)
Dept: ORTHOPEDIC SURGERY | Facility: CLINIC | Age: 65
End: 2020-07-13

## 2020-08-16 NOTE — ED PROVIDER NOTE - ENMT, MLM
Airway patent, Nasal mucosa clear. Mouth with normal mucosa. Throat has no vesicles, no oropharyngeal exudates and uvula is midline.
Regular rate and rhythm, Heart sounds S1 S2 present, no murmurs, rubs or gallops

## 2020-10-01 ENCOUNTER — MED ADMIN CHARGE (OUTPATIENT)
Age: 65
End: 2020-10-01

## 2020-10-01 ENCOUNTER — APPOINTMENT (OUTPATIENT)
Dept: INTERNAL MEDICINE | Facility: CLINIC | Age: 65
End: 2020-10-01
Payer: MEDICARE

## 2020-10-01 PROCEDURE — 90662 IIV NO PRSV INCREASED AG IM: CPT

## 2020-10-01 PROCEDURE — G0008: CPT

## 2020-11-17 ENCOUNTER — TRANSCRIPTION ENCOUNTER (OUTPATIENT)
Age: 65
End: 2020-11-17

## 2020-12-23 DIAGNOSIS — Z20.828 CONTACT WITH AND (SUSPECTED) EXPOSURE TO OTHER VIRAL COMMUNICABLE DISEASES: ICD-10-CM

## 2020-12-30 ENCOUNTER — LABORATORY RESULT (OUTPATIENT)
Age: 65
End: 2020-12-30
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
## 2021-05-15 NOTE — PHYSICAL THERAPY INITIAL EVALUATION ADULT - PHYSICAL ASSIST/NONPHYSICAL ASSIST: STAND/SIT, REHAB EVAL
[facility]  History & Physical   [place] [facility]    Patient Name:  Panda Canales  MRN: [id number]  Admission Date: 2018      Subjective:     Chief Complaint/Reason for Admission:  Infant is a 1 days  Boy Susannah Canales born at 35w2d  Infant boy was born on 2018 at 11:46 PM via Vaginal, Spontaneous Delivery.        Maternal History:  The mother is a 23 y.o.   . She  has a past medical history of Anemia; Bipolar affective disorder; Depression; Herpes simplex without mention of complication; History of ovarian cyst; Mental disorder; Miscarriage; and Tobacco use.     Prenatal Labs Review:  ABO/Rh:   Lab Results   Component Value Date/Time    GROUPTRH O POS 2018 05:40 PM    GROUPTRH O POS 07/15/2013 10:00 AM     Group B Beta Strep:   Lab Results   Component Value Date/Time    STREPBCULT Normal cervicovaginal ramona present 2018 09:00 PM     HIV: 2018: HIV 1/2 Ag/Ab Negative (Ref range: Negative)7/15/2013: HIV-1/HIV-2 Ab Negative (Ref range: Negative)  RPR:   Lab Results   Component Value Date/Time    RPR Non-reactive 2018 11:09 AM     Hepatitis B Surface Antigen:   Lab Results   Component Value Date/Time    HEPBSAG Negative 2018 03:25 PM     Rubella Immune Status:   Lab Results   Component Value Date/Time    RUBELLAIMMUN Indeterminate (A) 2018 03:25 PM       Pregnancy/Delivery Course:  The pregnancy was complicated by tobacco use, hx premature siblings(and jaundice), and bipolar. Prenatal ultrasound revealed normal anatomy. Prenatal care was good. Mother received no medications. Membranes ruptured on 2018 15:00:00  by SRM (Spontaneous Rupture) . The delivery was complicated by  attendance for prematurity. Apgar scores    Assessment:     1 Minute:   Skin color:     Muscle tone:     Heart rate:     Breathing:     Grimace:     Total:  8          5 Minute:   Skin color:     Muscle tone:     Heart rate:     Breathing:   
"  Grimace:     Total:  9          10 Minute:   Skin color:     Muscle tone:     Heart rate:     Breathing:     Grimace:     Total:           Living Status:       .    Review of Systems   Constitutional: Negative for activity change, appetite change, crying, decreased responsiveness, diaphoresis, fever and irritability.   HENT: Negative for congestion, rhinorrhea and trouble swallowing.    Eyes: Negative for discharge and redness.   Respiratory: Negative for apnea, cough, choking, wheezing and stridor.    Cardiovascular: Negative for fatigue with feeds, sweating with feeds and cyanosis.   Gastrointestinal: Negative for abdominal distention, anal bleeding, blood in stool, constipation, diarrhea and vomiting.   Genitourinary: Negative for scrotal swelling.        No penile or scrotal abnormalities   Musculoskeletal: Negative for extremity weakness and joint swelling.        No decreased tone   Skin: Negative for color change (no jaundice), pallor, rash and wound.   Neurological: Negative for seizures.   Hematological: Does not bruise/bleed easily.       Objective:     Vital Signs (Most Recent)  Temp: 98.2 °F (36.8 °C) (08/04/18 0800)  Pulse: 128 (08/04/18 0800)  Resp: 40 (08/04/18 0800)    Most Recent Weight: 2290 g (5 lb 0.8 oz) (Filed from Delivery Summary) (08/03/18 2346)  Admission Weight: 2290 g (5 lb 0.8 oz) (Filed from Delivery Summary) (08/03/18 2346)  Admission  Head Circumference: 31.5 cm (Filed from Delivery Summary)   Admission Length: Height: 43.5 cm (17.13") (Filed from Delivery Summary)    Physical Exam   Constitutional: He is active. He has a strong cry. No distress.   HENT:   Head: Anterior fontanelle is flat. No cranial deformity or facial anomaly.   Nose: No nasal discharge.   Mouth/Throat: Mucous membranes are moist. Oropharynx is clear. Pharynx is normal (no cleft).   Eyes: Conjunctivae are normal. Right eye exhibits no discharge. Left eye exhibits no discharge.   Neck: Normal range of motion. Neck "
supple.   Cardiovascular: Normal rate, regular rhythm, S1 normal and S2 normal.    No murmur heard.  Pulmonary/Chest: Effort normal and breath sounds normal. No nasal flaring or stridor. No respiratory distress. He has no wheezes. He has no rales. He exhibits no retraction.   Abdominal: Soft. Bowel sounds are normal. He exhibits no distension and no mass. There is no hepatosplenomegaly. There is no tenderness. There is no rebound and no guarding. No hernia (cord normal).   Genitourinary: Rectum normal and penis normal.   Genitourinary Comments: Normal genitalia. Anus patent. Testes down bilaterally   Musculoskeletal: Normal range of motion. He exhibits no edema, deformity or signs of injury (clavical intact).   No hip click   Lymphadenopathy: No occipital adenopathy is present.     He has no cervical adenopathy.   Neurological: He is alert. He has normal strength. He exhibits normal muscle tone. Suck normal. Symmetric [place].   Skin: Skin is warm. Turgor is normal. No petechiae, no purpura and no rash noted. He is not diaphoretic. No cyanosis. No jaundice.       Recent Results (from the past 168 hour(s))   Cord blood evaluation    Collection Time: 08/03/18 11:46 PM   Result Value Ref Range    Cord ABO O     Cord Rh POS     Cord Direct Chadd NEG    POCT glucose    Collection Time: 08/04/18 12:54 AM   Result Value Ref Range    POCT Glucose 39 (LL) 70 - 110 mg/dL   POCT glucose    Collection Time: 08/04/18  2:25 AM   Result Value Ref Range    POCT Glucose 64 (L) 70 - 110 mg/dL   CBC auto differential    Collection Time: 08/04/18  2:28 AM   Result Value Ref Range    WBC 14.32 5.00 - 34.00 K/uL    RBC 5.65 3.90 - 6.30 M/uL    Hemoglobin 20.8 (HH) 13.5 - 19.5 g/dL    Hematocrit 57.3 42.0 - 63.0 %     88 - 118 fL    MCH 36.8 31.0 - 37.0 pg    MCHC 36.3 28.0 - 38.0 g/dL    RDW 16.5 (H) 11.5 - 14.5 %    Platelets 330 150 - 350 K/uL    MPV 10.4 9.2 - 12.9 fL    Gran # (ANC) 8.7 1.5 - 28.0 K/uL    Lymph # 3.9 2.0 - 17.0 
K/uL    Mono # 1.6 0.2 - 2.2 K/uL    Eos # 0.0 0.0 - 0.8 K/uL    Baso # 0.03 0.02 - 0.10 K/uL    Gran% 61.6 30.0 - 82.0 %    Lymph% 27.5 (L) 40.0 - 50.0 %    Mono% 11.1 0.8 - 18.7 %    Eosinophil% 0.3 0.0 - 7.5 %    Basophil% 0.2 0.1 - 0.8 %    Platelet Estimate Appears normal     Aniso Slight     Poik Slight     Poly Occasional     Hypo Occasional     Target Cells Occasional     Tear Drop Cells Occasional     Stomatocytes Present     Differential Method Automated    POCT glucose    Collection Time: 18  3:53 AM   Result Value Ref Range    POCT Glucose 58 (L) 70 - 110 mg/dL   POCT glucose    Collection Time: 18  6:39 AM   Result Value Ref Range    POCT Glucose 45 (LL) 70 - 110 mg/dL   POCT glucose    Collection Time: 18 10:02 AM   Result Value Ref Range    POCT Glucose 51 (L) 70 - 110 mg/dL       Assessment and Plan:     * Single liveborn, born in hospital, delivered    Routine  care. hypoglycemia and prematurity protocol, monitor feeding, observe for jaundice(sibling hx). Minimum 48 hour admission. D/W parents. Family flu and adult tdap vaccines d/w parent(s)            JOLIE Baer Jr, MD  Pediatrics  [facility]   
No
verbal cues/1 person assist

## 2021-09-23 ENCOUNTER — APPOINTMENT (OUTPATIENT)
Dept: INTERNAL MEDICINE | Facility: CLINIC | Age: 66
End: 2021-09-23
Payer: COMMERCIAL

## 2021-09-23 PROCEDURE — 90662 IIV NO PRSV INCREASED AG IM: CPT

## 2021-09-23 PROCEDURE — G0008: CPT

## 2021-10-15 ENCOUNTER — NON-APPOINTMENT (OUTPATIENT)
Age: 66
End: 2021-10-15

## 2021-10-15 ENCOUNTER — APPOINTMENT (OUTPATIENT)
Dept: INTERNAL MEDICINE | Facility: CLINIC | Age: 66
End: 2021-10-15
Payer: MEDICARE

## 2021-10-15 VITALS — DIASTOLIC BLOOD PRESSURE: 60 MMHG | SYSTOLIC BLOOD PRESSURE: 120 MMHG

## 2021-10-15 VITALS
WEIGHT: 154 LBS | HEART RATE: 85 BPM | OXYGEN SATURATION: 99 % | TEMPERATURE: 97.6 F | HEIGHT: 62 IN | BODY MASS INDEX: 28.34 KG/M2

## 2021-10-15 DIAGNOSIS — R31.29 OTHER MICROSCOPIC HEMATURIA: ICD-10-CM

## 2021-10-15 DIAGNOSIS — Z87.828 PERSONAL HISTORY OF OTHER (HEALED) PHYSICAL INJURY AND TRAUMA: ICD-10-CM

## 2021-10-15 PROCEDURE — 36415 COLL VENOUS BLD VENIPUNCTURE: CPT

## 2021-10-15 PROCEDURE — G0444 DEPRESSION SCREEN ANNUAL: CPT | Mod: NC,59

## 2021-10-15 PROCEDURE — 93000 ELECTROCARDIOGRAM COMPLETE: CPT | Mod: 59

## 2021-10-15 PROCEDURE — 99397 PER PM REEVAL EST PAT 65+ YR: CPT | Mod: 25

## 2021-10-15 RX ORDER — MULTIVIT-MIN/IRON/FOLIC ACID/K 18-600-40
CAPSULE ORAL
Refills: 0 | Status: ACTIVE | COMMUNITY

## 2021-10-15 RX ORDER — MULTIVITAMIN
TABLET ORAL DAILY
Refills: 0 | Status: ACTIVE | COMMUNITY

## 2021-10-15 NOTE — PHYSICAL EXAM
[No Acute Distress] : no acute distress [Well Nourished] : well nourished [Well Developed] : well developed [Well-Appearing] : well-appearing [Normal Voice/Communication] : normal voice/communication [Normal Sclera/Conjunctiva] : normal sclera/conjunctiva [PERRL] : pupils equal round and reactive to light [EOMI] : extraocular movements intact [Normal Outer Ear/Nose] : the outer ears and nose were normal in appearance [Normal Oropharynx] : the oropharynx was normal [Normal TMs] : both tympanic membranes were normal [No JVD] : no jugular venous distention [Supple] : supple [No Lymphadenopathy] : no lymphadenopathy [Thyroid Normal, No Nodules] : the thyroid was normal and there were no nodules present [No Respiratory Distress] : no respiratory distress  [Clear to Auscultation] : lungs were clear to auscultation bilaterally [No Accessory Muscle Use] : no accessory muscle use [Normal Rate] : normal rate  [Regular Rhythm] : with a regular rhythm [Normal S1, S2] : normal S1 and S2 [No Murmur] : no murmur heard [No Carotid Bruits] : no carotid bruits [No Abdominal Bruit] : a ~M bruit was not heard ~T in the abdomen [No Varicosities] : no varicosities [Pedal Pulses Present] : the pedal pulses are present [No Edema] : there was no peripheral edema [No Extremity Clubbing/Cyanosis] : no extremity clubbing/cyanosis [No Palpable Aorta] : no palpable aorta [Normal Appearance] : normal in appearance [No Nipple Discharge] : no nipple discharge [No Axillary Lymphadenopathy] : no axillary lymphadenopathy [Soft] : abdomen soft [Non Tender] : non-tender [Non-distended] : non-distended [No Masses] : no abdominal mass palpated [No HSM] : no HSM [Normal Bowel Sounds] : normal bowel sounds [No Hernias] : no hernias [Normal Posterior Cervical Nodes] : no posterior cervical lymphadenopathy [Normal Anterior Cervical Nodes] : no anterior cervical lymphadenopathy [No CVA Tenderness] : no CVA  tenderness [No Spinal Tenderness] : no spinal tenderness [No Joint Swelling] : no joint swelling [Grossly Normal Strength/Tone] : grossly normal strength/tone [No Rash] : no rash [Normal Gait] : normal gait [Coordination Grossly Intact] : coordination grossly intact [No Focal Deficits] : no focal deficits [Deep Tendon Reflexes (DTR)] : deep tendon reflexes were 2+ and symmetric [Normal Affect] : the affect was normal [Normal Insight/Judgement] : insight and judgment were intact

## 2021-10-15 NOTE — ASSESSMENT
[FreeTextEntry1] : (1) HCM - discussed diet, exercise, weight maintenance.  Labs ordered in office as below.  Hepatitis C screening negative 4/10/17.  HIV testing offered to patient and patient declined.  Patient has received 3 doses of the Pfizer COVID-19 vaccination.  She received the influenza vaccination this season.  Tdap UTD from 10/30/17.  Patient declines getting the Prevnar-13 today, as she just got the 3rd COVID-19 vaccine 2 days ago and still has some achiness.  She will return for this.  She declines the Shingrix at this time.  Continue annual screening mammogram (and breast US) as well as Gyn visits as directed.  Patient had a colonoscopy 1/11/2012 with Dr. Mora, with 5 year follow-up recommended (reminder given to patient today and she will check with her current gastroenterologist - Dr. Bertrand Conner).  She reports a paternal uncle with rectal cancer.  Continue Gyn as directed.  Bone density done through Endocrine, and showed osteoporosis, and patient declines medication.  Patient reports that she has written advance directives and I asked her to forward a copy to me.\par \par (2) CV - ECG is NSR.  Patient has had intermittent palpitation, and has seen cardiology (Dr. Joel Goldberg) for this.  She uses Slo-Mg when needed, and has not been needing a beta blocker.  She is also now on Crestor for hyperlipidemia.\par \par (3) Vitamin D insufficiency - check level today.\par \par (4) GI - patient with improved reflux and managing it off medication at this point.\par \par (5) Allergy - patient saw Dr. Boxer's in 2017.  Patient not found to have specific allergies on testing, although she is staying off the PPI's and H2 blockers.
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
## 2021-10-15 NOTE — HEALTH RISK ASSESSMENT
[Very Good] : ~his/her~  mood as very good [No falls in past year] : Patient reported no falls in the past year [0] : 2) Feeling down, depressed, or hopeless: Not at all (0) [Patient reported mammogram was normal] : Patient reported mammogram was normal [Patient reported PAP Smear was normal] : Patient reported PAP Smear was normal [Patient reported colonoscopy was normal] : Patient reported colonoscopy was normal [HIV test declined] : HIV test declined [None] : None [With Family] : lives with family [] :  [Sexually Active] : sexually active [Feels Safe at Home] : Feels safe at home [Fully functional (bathing, dressing, toileting, transferring, walking, feeding)] : Fully functional (bathing, dressing, toileting, transferring, walking, feeding) [Fully functional (using the telephone, shopping, preparing meals, housekeeping, doing laundry, using] : Fully functional and needs no help or supervision to perform IADLs (using the telephone, shopping, preparing meals, housekeeping, doing laundry, using transportation, managing medications and managing finances) [Smoke Detector] : smoke detector [Carbon Monoxide Detector] : carbon monoxide detector [Seat Belt] :  uses seat belt [Sunscreen] : uses sunscreen [No] : No [PHQ-2 Negative - No further assessment needed] : PHQ-2 Negative - No further assessment needed [With Patient/Caregiver] : , with patient/caregiver [Designated Healthcare Proxy] : Designated healthcare proxy [Name: ___] : Health Care Proxy's Name: [unfilled]  [Relationship: ___] : Relationship: [unfilled] [] : No [de-identified] : Walks for 45min, up to 3x/week. [de-identified] : Breakfast - protein cereal, almond milk, egg with avocado, piece of toast.  Apple for snack at school.  Lunch - coconut yogurt with banana, almonds, cereal, salad, sometimes with protein.  Dinner - salad, fish or chicken, vegetables  cucumbers, lettuce, olives, avocado , steamed vegetables.  Avoids red meat.  Occasional cookie [de-identified] : Fell early 2020. [[id number]] : 0 [Change in mental status noted] : No change in mental status noted [Language] : denies difficulty with language [Behavior] : denies difficulty with behavior [Handling Complex Tasks] : denies difficulty handling complex tasks [Reasoning] : denies difficulty with reasoning [High Risk Behavior] : no high risk behavior [Reports changes in hearing] : Reports no changes in hearing [Reports changes in vision] : Reports no changes in vision [Reports changes in dental health] : Reports no changes in dental health [MammogramDate] : 2020 [MammogramComments] : [facility] - Dr. Cornell Sadler  [PapSmearDate] : 07/18 [PapSmearComments] : Dr. Nikkie Norton [BoneDensityDate] : 2020 [BoneDensityComments] : Osteoporosis in 1 area.  Dr. Aman Lozada   Not on treatment. [ColonoscopyDate] : 1/11/2012 [ColonoscopyComments] : Dr. Boyer.  No polyps.  Patient will discuss with new GI. 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 [de-identified] : Drives a car. [de-identified] : Glasses, Nearsighted. Dr. Cedric Tejada [de-identified] : Going regularly. [AdvancecareDate] : 10/15/2021 [FreeTextEntry4] : Patient reports having a written Health Care Proxy and/or Living Will and will forward a copy.

## 2021-10-25 LAB
25(OH)D3 SERPL-MCNC: 61.2 NG/ML
ALBUMIN SERPL ELPH-MCNC: 4.7 G/DL
ALP BLD-CCNC: 110 U/L
ALT SERPL-CCNC: 23 U/L
ANION GAP SERPL CALC-SCNC: 14 MMOL/L
APPEARANCE: CLEAR
AST SERPL-CCNC: 22 U/L
BACTERIA: NEGATIVE
BASOPHILS # BLD AUTO: 0.04 K/UL
BASOPHILS NFR BLD AUTO: 0.4 %
BILIRUB SERPL-MCNC: 0.5 MG/DL
BILIRUBIN URINE: NEGATIVE
BLOOD URINE: NEGATIVE
BUN SERPL-MCNC: 14 MG/DL
CALCIUM SERPL-MCNC: 9.9 MG/DL
CHLORIDE SERPL-SCNC: 103 MMOL/L
CHOLEST SERPL-MCNC: 191 MG/DL
CO2 SERPL-SCNC: 23 MMOL/L
COLOR: NORMAL
CREAT SERPL-MCNC: 0.76 MG/DL
EOSINOPHIL # BLD AUTO: 0.08 K/UL
EOSINOPHIL NFR BLD AUTO: 0.9 %
ESTIMATED AVERAGE GLUCOSE: 111 MG/DL
GLUCOSE QUALITATIVE U: NEGATIVE
GLUCOSE SERPL-MCNC: 89 MG/DL
HBA1C MFR BLD HPLC: 5.5 %
HCT VFR BLD CALC: 44.7 %
HDLC SERPL-MCNC: 54 MG/DL
HGB BLD-MCNC: 13.7 G/DL
HYALINE CASTS: 0 /LPF
IMM GRANULOCYTES NFR BLD AUTO: 0.2 %
KETONES URINE: NEGATIVE
LDLC SERPL CALC-MCNC: 109 MG/DL
LEUKOCYTE ESTERASE URINE: NEGATIVE
LYMPHOCYTES # BLD AUTO: 2.52 K/UL
LYMPHOCYTES NFR BLD AUTO: 27.7 %
MAN DIFF?: NORMAL
MCHC RBC-ENTMCNC: 30.6 GM/DL
MCHC RBC-ENTMCNC: 31.1 PG
MCV RBC AUTO: 101.4 FL
MICROSCOPIC-UA: NORMAL
MONOCYTES # BLD AUTO: 0.73 K/UL
MONOCYTES NFR BLD AUTO: 8 %
NEUTROPHILS # BLD AUTO: 5.72 K/UL
NEUTROPHILS NFR BLD AUTO: 62.8 %
NITRITE URINE: NEGATIVE
NONHDLC SERPL-MCNC: 137 MG/DL
PH URINE: 6.5
PLATELET # BLD AUTO: 295 K/UL
POTASSIUM SERPL-SCNC: 4.6 MMOL/L
PROT SERPL-MCNC: 7.5 G/DL
PROTEIN URINE: NEGATIVE
RBC # BLD: 4.41 M/UL
RBC # FLD: 12.3 %
RED BLOOD CELLS URINE: 3 /HPF
SODIUM SERPL-SCNC: 140 MMOL/L
SPECIFIC GRAVITY URINE: 1.02
SQUAMOUS EPITHELIAL CELLS: 2 /HPF
T4 FREE SERPL-MCNC: 1 NG/DL
TRIGL SERPL-MCNC: 139 MG/DL
TSH SERPL-ACNC: 1.12 UIU/ML
UROBILINOGEN URINE: NORMAL
VIT B12 SERPL-MCNC: 1016 PG/ML
WBC # FLD AUTO: 9.11 K/UL
WHITE BLOOD CELLS URINE: 1 /HPF

## 2021-10-25 RX ORDER — ADHESIVE TAPE 3"X 2.3 YD
50 MCG TAPE, NON-MEDICATED TOPICAL
Qty: 90 | Refills: 0 | Status: ACTIVE | COMMUNITY

## 2021-12-27 ENCOUNTER — TRANSCRIPTION ENCOUNTER (OUTPATIENT)
Age: 66
End: 2021-12-27

## 2022-01-07 ENCOUNTER — APPOINTMENT (OUTPATIENT)
Dept: INTERNAL MEDICINE | Facility: CLINIC | Age: 67
End: 2022-01-07
Payer: COMMERCIAL

## 2022-01-07 PROCEDURE — 99442: CPT

## 2022-01-12 ENCOUNTER — NON-APPOINTMENT (OUTPATIENT)
Age: 67
End: 2022-01-12

## 2022-01-12 ENCOUNTER — APPOINTMENT (OUTPATIENT)
Dept: INTERNAL MEDICINE | Facility: CLINIC | Age: 67
End: 2022-01-12
Payer: MEDICARE

## 2022-01-12 VITALS
HEART RATE: 102 BPM | WEIGHT: 154 LBS | OXYGEN SATURATION: 98 % | TEMPERATURE: 97.3 F | DIASTOLIC BLOOD PRESSURE: 68 MMHG | BODY MASS INDEX: 28.34 KG/M2 | SYSTOLIC BLOOD PRESSURE: 124 MMHG | HEIGHT: 62 IN

## 2022-01-12 DIAGNOSIS — R06.2 WHEEZING: ICD-10-CM

## 2022-01-12 DIAGNOSIS — R09.81 NASAL CONGESTION: ICD-10-CM

## 2022-01-12 PROCEDURE — 99214 OFFICE O/P EST MOD 30 MIN: CPT

## 2022-01-12 RX ORDER — ROSUVASTATIN CALCIUM 10 MG/1
10 TABLET, FILM COATED ORAL DAILY
Qty: 90 | Refills: 0 | Status: COMPLETED | COMMUNITY
End: 2022-01-12

## 2022-01-12 RX ORDER — CLINDAMYCIN HYDROCHLORIDE 300 MG/1
300 CAPSULE ORAL
Qty: 10 | Refills: 0 | Status: COMPLETED | COMMUNITY
Start: 2021-08-25

## 2022-01-12 RX ORDER — NAPROXEN 500 MG/1
500 TABLET ORAL
Qty: 20 | Refills: 0 | Status: COMPLETED | COMMUNITY
Start: 2021-08-25

## 2022-01-12 RX ORDER — SUCRALFATE 1 G/1
1 TABLET ORAL
Qty: 90 | Refills: 0 | Status: COMPLETED | COMMUNITY
Start: 2021-07-15

## 2022-01-12 NOTE — HISTORY OF PRESENT ILLNESS
[de-identified] : Patient presents for general follow-up.  Patient developed COVID-19 symptoms before Maulik, with low grade fever.  She had a persistent runny nose.  She got tested at [facility] 12/27/2021, and rapid was negative and PCR was negative.  She continued to have the runny nose and low grade temperatures of 99.5.  One week ago, it became more stuffy than runny.  She was using Nasacort, and stopped 2 days ago as she felt it wasn't helping.  She has a mild frontal headache.  She had decreased taste or smell about 7 days ago.  Her resting HR was about 105.  There was a slight sore throat.  No chest pain, dyspnea.  She took a Z-pack which she had from another Ur[facility] ([facility] visit, where she went 1/3/2022.  A PCR that day was positive.  She used a Netipot.  She took Sudafed initially and stopped it.  She feels "bubbly" in the chest, but not a chest pain or tightness.  She does not hear or feel wheezing.  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

## 2022-01-12 NOTE — ASSESSMENT
[FreeTextEntry1] : Patient with continued symptoms of nasal stuffiness and occasional dry cough (mild).  She feels her chest is "bubbly", and wheezing noted on exam.  As she has had some palpitations and tachycardia, will give Atrovent HFA inhaler.  For the nasal stuffiness, will give Astelin.  She can take Mucinex (or Mucinex DM) if needed.  Patient wishes to use a minimum of medication, but she can call me if these are not effective.  Follow-up in 2 weeks.

## 2022-01-12 NOTE — PHYSICAL EXAM
[Normal Voice/Communication] : normal voice/communication [Normal Sclera/Conjunctiva] : normal sclera/conjunctiva [No Lymphadenopathy] : no lymphadenopathy [Normal] : normal rate, regular rhythm, normal S1 and S2 and no murmur heard [No Edema] : there was no peripheral edema [de-identified] : No sinus tenderness [de-identified] : +b/l wheezing on inspiration and expiration

## 2022-01-26 ENCOUNTER — APPOINTMENT (OUTPATIENT)
Dept: INTERNAL MEDICINE | Facility: CLINIC | Age: 67
End: 2022-01-26
Payer: MEDICARE

## 2022-01-26 VITALS
HEIGHT: 62 IN | SYSTOLIC BLOOD PRESSURE: 107 MMHG | WEIGHT: 157 LBS | TEMPERATURE: 98.6 F | OXYGEN SATURATION: 98 % | BODY MASS INDEX: 28.89 KG/M2 | DIASTOLIC BLOOD PRESSURE: 70 MMHG | HEART RATE: 68 BPM

## 2022-01-26 DIAGNOSIS — U09.9 POST COVID-19 CONDITION, UNSPECIFIED: ICD-10-CM

## 2022-01-26 PROCEDURE — 99213 OFFICE O/P EST LOW 20 MIN: CPT

## 2022-01-26 NOTE — PHYSICAL EXAM
[Normal Voice/Communication] : normal voice/communication [Normal Sclera/Conjunctiva] : normal sclera/conjunctiva [Normal Outer Ear/Nose] : the outer ears and nose were normal in appearance [Normal Oropharynx] : the oropharynx was normal [Normal TMs] : both tympanic membranes were normal [No Lymphadenopathy] : no lymphadenopathy [Normal Rate] : normal rate  [Regular Rhythm] : with a regular rhythm [Normal S1, S2] : normal S1 and S2 [No Murmur] : no murmur heard [No Edema] : there was no peripheral edema [Normal] : soft, non-tender, non-distended, no masses palpated, no HSM and normal bowel sounds

## 2022-01-26 NOTE — ASSESSMENT
[FreeTextEntry1] : Patient's COVID-19 symptoms have improved considerably since her last visit, and she does not feel she needs the inhaler or nasal spray.  She can use them on a prn basis.  She will call for further issues.

## 2022-01-26 NOTE — HISTORY OF PRESENT ILLNESS
[de-identified] : Patient for follow-up from recent visit for ongoing COVID-19 symptoms.  Patient used the Atrovent inhaler for 2 days, and then had improvement in the chest bubbling symptoms.  She discontinued it.  No wheezing or chest tightness.  She got her sense of taste and smell back.  No further fevers or chills.  She used the azelastine a few times, and it helped as well.  She uses it occasionally at this point.

## 2022-02-06 ENCOUNTER — TRANSCRIPTION ENCOUNTER (OUTPATIENT)
Age: 67
End: 2022-02-06

## 2022-04-25 ENCOUNTER — LABORATORY RESULT (OUTPATIENT)
Age: 67
End: 2022-04-25

## 2022-04-25 ENCOUNTER — APPOINTMENT (OUTPATIENT)
Dept: INTERNAL MEDICINE | Facility: CLINIC | Age: 67
End: 2022-04-25
Payer: MEDICARE

## 2022-04-25 ENCOUNTER — NON-APPOINTMENT (OUTPATIENT)
Age: 67
End: 2022-04-25

## 2022-04-25 VITALS
HEART RATE: 87 BPM | DIASTOLIC BLOOD PRESSURE: 68 MMHG | BODY MASS INDEX: 29.26 KG/M2 | TEMPERATURE: 97.3 F | HEIGHT: 62 IN | SYSTOLIC BLOOD PRESSURE: 118 MMHG | WEIGHT: 159 LBS | OXYGEN SATURATION: 98 %

## 2022-04-25 DIAGNOSIS — M79.10 MYALGIA, UNSPECIFIED SITE: ICD-10-CM

## 2022-04-25 DIAGNOSIS — R53.83 OTHER MALAISE: ICD-10-CM

## 2022-04-25 DIAGNOSIS — R53.81 OTHER MALAISE: ICD-10-CM

## 2022-04-25 PROCEDURE — 36415 COLL VENOUS BLD VENIPUNCTURE: CPT

## 2022-04-25 PROCEDURE — 99214 OFFICE O/P EST MOD 30 MIN: CPT | Mod: 25

## 2022-04-25 PROCEDURE — 93000 ELECTROCARDIOGRAM COMPLETE: CPT

## 2022-04-29 LAB
ALBUMIN SERPL ELPH-MCNC: 4.4 G/DL
ALP BLD-CCNC: 86 U/L
ALT SERPL-CCNC: 22 U/L
ANA PAT FLD IF-IMP: NORMAL
ANACR T: ABNORMAL
ANION GAP SERPL CALC-SCNC: 12 MMOL/L
AST SERPL-CCNC: 18 U/L
BASOPHILS # BLD AUTO: 0.04 K/UL
BASOPHILS NFR BLD AUTO: 0.4 %
BILIRUB SERPL-MCNC: 0.3 MG/DL
BUN SERPL-MCNC: 27 MG/DL
CALCIUM SERPL-MCNC: 9.9 MG/DL
CHLORIDE SERPL-SCNC: 105 MMOL/L
CK SERPL-CCNC: 81 U/L
CO2 SERPL-SCNC: 23 MMOL/L
CREAT SERPL-MCNC: 0.77 MG/DL
EGFR: 85 ML/MIN/1.73M2
EOSINOPHIL # BLD AUTO: 0.14 K/UL
EOSINOPHIL NFR BLD AUTO: 1.3 %
ERYTHROCYTE [SEDIMENTATION RATE] IN BLOOD BY WESTERGREN METHOD: 32 MM/HR
GLUCOSE SERPL-MCNC: 91 MG/DL
HCT VFR BLD CALC: 41.4 %
HGB BLD-MCNC: 13 G/DL
IMM GRANULOCYTES NFR BLD AUTO: 0.4 %
LYMPHOCYTES # BLD AUTO: 5.13 K/UL
LYMPHOCYTES NFR BLD AUTO: 46.1 %
MAN DIFF?: NORMAL
MCHC RBC-ENTMCNC: 30.4 PG
MCHC RBC-ENTMCNC: 31.4 GM/DL
MCV RBC AUTO: 97 FL
MONOCYTES # BLD AUTO: 0.83 K/UL
MONOCYTES NFR BLD AUTO: 7.5 %
NEUTROPHILS # BLD AUTO: 4.95 K/UL
NEUTROPHILS NFR BLD AUTO: 44.3 %
PLATELET # BLD AUTO: 311 K/UL
POTASSIUM SERPL-SCNC: 4.6 MMOL/L
PROT SERPL-MCNC: 6.9 G/DL
RAPID RVP RESULT: NOT DETECTED
RBC # BLD: 4.27 M/UL
RBC # FLD: 12.1 %
SARS-COV-2 RNA PNL RESP NAA+PROBE: NOT DETECTED
SODIUM SERPL-SCNC: 141 MMOL/L
T4 FREE SERPL-MCNC: 1 NG/DL
TSH SERPL-ACNC: 1.19 UIU/ML
WBC # FLD AUTO: 11.13 K/UL

## 2022-05-02 ENCOUNTER — APPOINTMENT (OUTPATIENT)
Dept: CARDIOLOGY | Facility: CLINIC | Age: 67
End: 2022-05-02

## 2022-05-19 ENCOUNTER — NON-APPOINTMENT (OUTPATIENT)
Age: 67
End: 2022-05-19

## 2022-06-04 NOTE — ASSESSMENT
[FreeTextEntry1] : Patient with achiness, feet/leg pain with walking.  She has been to podiatry.  She will see Cardiology soon, and will have an evaluation.  Will check ANGELINA, ESR, CPK.  Consider rheumatology evaluation if indicated.\par \par Unclear if statin is causing patient's symptoms.  She can hold the statin for 2 weeks.  If the pain is unchanged, then it is not likely the statin.\par \par Labs drawn in office as below.\par Script given for RVP.

## 2022-06-04 NOTE — HISTORY OF PRESENT ILLNESS
[FreeTextEntry8] : Patient exhausted from the holidays.  When she started walking, her feet and legs were hurting.  She got new sneakers with orthotics from podiatrist.  Her legs feel heavy when walking.  She has some achiness in her muscles, and not joints.  She also has some palpitations.  Her sleep pattern is off.  She can fall asleep early, but does not get a full night's sleep.  Today her whole body is achy.  She had a negative COVID-19 test at home today and also a week ago.  Her niece has has COVID-19.  She has no cough, runny nose, or dyspnea.  She does have a headaches.  This past Saturday 4/23/2022, she had some abdominal achiness, with no nausea or vomiting.  Her appetite is good.  No loss of taste or smell, rashes, or chest pain.\par \par Patient will be seeing Dr. Sheridan () as a new patient soon.\par \par Patient maura pneumococcal vaccination.\par \par Patient walks and does resistance training for exercise.

## 2022-06-06 ENCOUNTER — NON-APPOINTMENT (OUTPATIENT)
Age: 67
End: 2022-06-06

## 2022-06-06 ENCOUNTER — APPOINTMENT (OUTPATIENT)
Dept: CARDIOLOGY | Facility: CLINIC | Age: 67
End: 2022-06-06
Payer: MEDICARE

## 2022-06-06 ENCOUNTER — LABORATORY RESULT (OUTPATIENT)
Age: 67
End: 2022-06-06

## 2022-06-06 VITALS
RESPIRATION RATE: 14 BRPM | SYSTOLIC BLOOD PRESSURE: 114 MMHG | HEART RATE: 92 BPM | WEIGHT: 163 LBS | DIASTOLIC BLOOD PRESSURE: 60 MMHG | OXYGEN SATURATION: 95 % | HEIGHT: 62 IN | BODY MASS INDEX: 30 KG/M2

## 2022-06-06 DIAGNOSIS — Z82.49 FAMILY HISTORY OF ISCHEMIC HEART DISEASE AND OTHER DISEASES OF THE CIRCULATORY SYSTEM: ICD-10-CM

## 2022-06-06 DIAGNOSIS — R06.00 DYSPNEA, UNSPECIFIED: ICD-10-CM

## 2022-06-06 PROCEDURE — 99205 OFFICE O/P NEW HI 60 MIN: CPT

## 2022-06-06 PROCEDURE — 93000 ELECTROCARDIOGRAM COMPLETE: CPT

## 2022-06-06 PROCEDURE — 36415 COLL VENOUS BLD VENIPUNCTURE: CPT

## 2022-06-06 RX ORDER — FLUOCINOLONE ACETONIDE 0.25 MG/G
0.03 OINTMENT TOPICAL
Qty: 60 | Refills: 0 | Status: DISCONTINUED | COMMUNITY
Start: 2022-03-30

## 2022-06-06 RX ORDER — DICLOFENAC SODIUM 1% 10 MG/G
1 GEL TOPICAL
Qty: 100 | Refills: 0 | Status: DISCONTINUED | COMMUNITY
Start: 2022-05-12

## 2022-06-06 RX ORDER — IPRATROPIUM BROMIDE 17 UG/1
17 AEROSOL, METERED RESPIRATORY (INHALATION)
Qty: 1 | Refills: 1 | Status: DISCONTINUED | COMMUNITY
Start: 2022-01-12 | End: 2022-06-06

## 2022-06-06 RX ORDER — NEOMYCIN AND POLYMYXIN B SULFATES AND DEXAMETHASONE 3.5; 10000; 1 MG/G; [IU]/G; MG/G
3.5-10000-0.1 OINTMENT OPHTHALMIC
Qty: 4 | Refills: 0 | Status: DISCONTINUED | COMMUNITY
Start: 2022-02-10

## 2022-06-08 LAB
ALBUMIN SERPL ELPH-MCNC: 4.5 G/DL
ALP BLD-CCNC: 87 U/L
ALT SERPL-CCNC: 23 U/L
ANION GAP SERPL CALC-SCNC: 12 MMOL/L
AST SERPL-CCNC: 18 U/L
BASOPHILS # BLD AUTO: 0.07 K/UL
BASOPHILS NFR BLD AUTO: 0.6 %
BILIRUB SERPL-MCNC: 0.2 MG/DL
BUN SERPL-MCNC: 22 MG/DL
CALCIUM SERPL-MCNC: 9.8 MG/DL
CHLORIDE SERPL-SCNC: 105 MMOL/L
CHOLEST SERPL-MCNC: 194 MG/DL
CK SERPL-CCNC: 81 U/L
CO2 SERPL-SCNC: 25 MMOL/L
CREAT SERPL-MCNC: 0.7 MG/DL
EGFR: 95 ML/MIN/1.73M2
EOSINOPHIL # BLD AUTO: 0.21 K/UL
EOSINOPHIL NFR BLD AUTO: 1.9 %
ESTIMATED AVERAGE GLUCOSE: 117 MG/DL
GLUCOSE SERPL-MCNC: 109 MG/DL
HBA1C MFR BLD HPLC: 5.7 %
HCT VFR BLD CALC: 41.6 %
HDLC SERPL-MCNC: 48 MG/DL
HGB BLD-MCNC: 13.1 G/DL
IMM GRANULOCYTES NFR BLD AUTO: 0.4 %
LDLC SERPL CALC-MCNC: 89 MG/DL
LDLC SERPL DIRECT ASSAY-MCNC: 106 MG/DL
LYMPHOCYTES # BLD AUTO: 4.8 K/UL
LYMPHOCYTES NFR BLD AUTO: 43.6 %
MAN DIFF?: NORMAL
MCHC RBC-ENTMCNC: 30.9 PG
MCHC RBC-ENTMCNC: 31.5 GM/DL
MCV RBC AUTO: 98.1 FL
MONOCYTES # BLD AUTO: 0.84 K/UL
MONOCYTES NFR BLD AUTO: 7.6 %
NEUTROPHILS # BLD AUTO: 5.04 K/UL
NEUTROPHILS NFR BLD AUTO: 45.9 %
NONHDLC SERPL-MCNC: 146 MG/DL
PLATELET # BLD AUTO: 290 K/UL
POTASSIUM SERPL-SCNC: 4.3 MMOL/L
PROT SERPL-MCNC: 6.8 G/DL
RBC # BLD: 4.24 M/UL
RBC # FLD: 12.1 %
SODIUM SERPL-SCNC: 142 MMOL/L
TRIGL SERPL-MCNC: 287 MG/DL
TSH SERPL-ACNC: 1.35 UIU/ML
WBC # FLD AUTO: 11 K/UL

## 2022-06-08 NOTE — REASON FOR VISIT
[FreeTextEntry1] : \jovi Triplettene Kyra presents for cardiac evaluation regarding HLD and a family hx. of heart disease.

## 2022-06-08 NOTE — HISTORY OF PRESENT ILLNESS
[FreeTextEntry1] : She is 66 years old.  She has a family history of heart disease.  Her father required bypass surgery at approximately age 60 and  at the age of 72 of a myocardial infarction.  Her paternal grandfather also had heart disease, and  at approximately age 50 of an MI.  Her mother also has heart disease, late in life.  Her mother is alive at age 97.  She had an aortic valve replacement performed surgically at age 79.  She also has a history of pacemaker placement and a recent small heart attack.\par \par Mrs. Rae has a history of hyperlipidemia currently treated with Crestor 5 mg every other day.  She has no history of hypertension or diabetes.  She has never been a cigarette smoker.\par \par She remains active without limitation.  She reports no episodes of exertional chest discomfort.  She has occasional episodes of shortness of breath, which are more apt to occur after a large meal.  She reports occasional palpitations which occur randomly.\par \par She was previously seen by Dr. Joel Goldberg.  Cardiac work-up in the past included echocardiography, stress testing which did not show any significant abnormality other than a mild valvular leak.  Apparently only low-grade ectopy was seen on cardiac monitoring and she was treated with magnesium supplementation but not with prescription medication.\par \par She has a history of gastroesophageal reflux, but does not require treatment with medications at the present time.

## 2022-06-08 NOTE — DISCUSSION/SUMMARY
[FreeTextEntry1] : I reassured Mrs. Rae that her physical examination today is unremarkable with normal blood pressure.  EKG appears to be normal.\par \par We will send out blood work today.  She will continue on Crestor at her current dose (presently she is taking 5 mg every other day), pending results of the lipid profile.  We will call her when results are available.\par \par I will request a copy of her cardiac records from Dr. Goldberg's office for review.\par \par 62 minutes spent on today's office visit. \par \par I asked her to return here in 3 months.

## 2022-06-16 ENCOUNTER — APPOINTMENT (OUTPATIENT)
Dept: RHEUMATOLOGY | Facility: CLINIC | Age: 67
End: 2022-06-16

## 2022-07-28 ENCOUNTER — NON-APPOINTMENT (OUTPATIENT)
Age: 67
End: 2022-07-28

## 2022-07-28 ENCOUNTER — APPOINTMENT (OUTPATIENT)
Dept: CARDIOLOGY | Facility: CLINIC | Age: 67
End: 2022-07-28

## 2022-07-28 VITALS
OXYGEN SATURATION: 98 % | SYSTOLIC BLOOD PRESSURE: 108 MMHG | WEIGHT: 163 LBS | BODY MASS INDEX: 29.81 KG/M2 | HEART RATE: 81 BPM | DIASTOLIC BLOOD PRESSURE: 70 MMHG

## 2022-07-28 PROCEDURE — 93000 ELECTROCARDIOGRAM COMPLETE: CPT

## 2022-07-28 PROCEDURE — 99214 OFFICE O/P EST MOD 30 MIN: CPT

## 2022-08-01 NOTE — DISCUSSION/SUMMARY
[EKG obtained to assist in diagnosis and management of assessed problem(s)] : EKG obtained to assist in diagnosis and management of assessed problem(s) [FreeTextEntry1] : HLD - continue Crestor at her current dose; should consider increasing the dose to 5 mg daily.\par \par I request a copy of her cardiac records from Dr. Goldberg's office for review.\par \par 30  minutes spent on today's office visit. \par \par I asked her to return here in 3 months.

## 2022-08-01 NOTE — HISTORY OF PRESENT ILLNESS
[FreeTextEntry1] : She has a family history of heart disease.  Her father required bypass surgery at approximately age 60 and  at the age of 72 of a myocardial infarction.  Her paternal grandfather also had heart disease, and  at approximately age 50 of an MI.  Her mother also has heart disease, late in life.  Her mother is alive at age 97.  She had an aortic valve replacement performed surgically at age 79.  Mother also has a history of pacemaker placement and a recent small heart attack.\par \par Mrs. Rae has a history of hyperlipidemia currently treated with Crestor 5 mg every other day.  She has no history of hypertension or diabetes.  She has never been a cigarette smoker.\par \par As she returns today, she remains well.  There have been no episodes of CP or significant shortness of breath.  Palps have been infrequent.  \par \par She has not needed meds for her hx. of GERD.

## 2022-08-26 ENCOUNTER — NON-APPOINTMENT (OUTPATIENT)
Age: 67
End: 2022-08-26

## 2022-09-12 ENCOUNTER — APPOINTMENT (OUTPATIENT)
Dept: CARDIOLOGY | Facility: CLINIC | Age: 67
End: 2022-09-12

## 2022-09-12 NOTE — DISCUSSION/SUMMARY
[EKG obtained to assist in diagnosis and management of assessed problem(s)] : EKG obtained to assist in diagnosis and management of assessed problem(s) [FreeTextEntry1] : HLD - continue Crestor at her current dose; should consider increasing the dose to 5 mg daily.\par \par I request again request a copy of her cardiac records from Dr. Goldberg's office for review.\par \par    minutes spent on today's office visit. \par \par I asked her to return here in 3 months.

## 2022-09-12 NOTE — REASON FOR VISIT
[FreeTextEntry1] : NO SHOW FOR APPOINTMENT TODAY. \par \par \par PRELIMINARY NOTE\par \par Emily Rae returns re HLD and a family hx. of heart disease.

## 2022-09-12 NOTE — HISTORY OF PRESENT ILLNESS
[FreeTextEntry1] : She has a family history of heart disease, as her father required CABG at approximately age 60 and  at the age of 72 of an MI.  Her paternal grandfather also had heart disease, and  at approximately age 50 of an MI.  Her mother also has heart disease, late in life.  Her mother is alive at age 97; she had a SAVR at age 79.  Mother also has a history of PPM and a recent small heart attack.\par \par Mrs. Rae has HLD treated with Crestor 5 mg every other day.  She has no history of hypertension or diabetes.  She has never been a cigarette smoker.\par \par As she returns today, \par \par \par she remains well.  There have been no episodes of CP or significant shortness of breath.  Palps have been infrequent.  \par \par She has not needed meds for her hx. of GERD.

## 2022-10-17 ENCOUNTER — APPOINTMENT (OUTPATIENT)
Dept: SURGERY | Facility: CLINIC | Age: 67
End: 2022-10-17

## 2022-10-17 PROCEDURE — 99204K: CUSTOM

## 2022-10-31 ENCOUNTER — APPOINTMENT (OUTPATIENT)
Dept: INTERNAL MEDICINE | Facility: CLINIC | Age: 67
End: 2022-10-31

## 2022-10-31 ENCOUNTER — LABORATORY RESULT (OUTPATIENT)
Age: 67
End: 2022-10-31

## 2022-10-31 VITALS
TEMPERATURE: 97.1 F | SYSTOLIC BLOOD PRESSURE: 112 MMHG | OXYGEN SATURATION: 98 % | HEART RATE: 89 BPM | WEIGHT: 162 LBS | BODY MASS INDEX: 29.81 KG/M2 | DIASTOLIC BLOOD PRESSURE: 68 MMHG | HEIGHT: 62 IN

## 2022-10-31 PROCEDURE — G0439: CPT

## 2022-10-31 PROCEDURE — 36415 COLL VENOUS BLD VENIPUNCTURE: CPT

## 2022-10-31 PROCEDURE — G0444 DEPRESSION SCREEN ANNUAL: CPT | Mod: 59

## 2022-10-31 RX ORDER — PANTOPRAZOLE 40 MG/1
40 TABLET, DELAYED RELEASE ORAL DAILY
Qty: 30 | Refills: 2 | Status: COMPLETED | COMMUNITY
Start: 2022-07-28 | End: 2022-10-31

## 2022-10-31 NOTE — HEALTH RISK ASSESSMENT
[Very Good] : ~his/her~  mood as very good [No falls in past year] : Patient reported no falls in the past year [0] : 2) Feeling down, depressed, or hopeless: Not at all (0) [PHQ-2 Negative - No further assessment needed] : PHQ-2 Negative - No further assessment needed [Patient reported mammogram was normal] : Patient reported mammogram was normal [Patient reported PAP Smear was normal] : Patient reported PAP Smear was normal [Patient reported colonoscopy was normal] : Patient reported colonoscopy was normal [HIV test declined] : HIV test declined [None] : None [With Family] : lives with family [] :  [Sexually Active] : sexually active [Feels Safe at Home] : Feels safe at home [Fully functional (bathing, dressing, toileting, transferring, walking, feeding)] : Fully functional (bathing, dressing, toileting, transferring, walking, feeding) [Fully functional (using the telephone, shopping, preparing meals, housekeeping, doing laundry, using] : Fully functional and needs no help or supervision to perform IADLs (using the telephone, shopping, preparing meals, housekeeping, doing laundry, using transportation, managing medications and managing finances) [Smoke Detector] : smoke detector [Carbon Monoxide Detector] : carbon monoxide detector [Seat Belt] :  uses seat belt [Sunscreen] : uses sunscreen [With Patient/Caregiver] : , with patient/caregiver [Designated Healthcare Proxy] : Designated healthcare proxy [Name: ___] : Health Care Proxy's Name: [unfilled]  [Relationship: ___] : Relationship: [unfilled] [No] : In the past 12 months have you used drugs other than those required for medical reasons? No [Patient reported bone density results were abnormal] : Patient reported bone density results were abnormal [de-identified] : Walks for 45min, up to 3x/week.  Working with .  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 [de-identified] : Breakfast - protein cereal, almond milk, egg with avocado, piece of toast.  Apple for snack at school.  Lunch - coconut yogurt with banana, almonds, cereal, salad, sometimes with protein.  Dinner - salad, fish or chicken, vegetables  cucumbers, lettuce, olives, avocado , steamed vegetables.  Avoids red meat.  Occasional cookie [de-identified] : Fell early 2020. [TAH1Inkkj] : 0 [Change in mental status noted] : No change in mental status noted [Language] : denies difficulty with language [Behavior] : denies difficulty with behavior [Handling Complex Tasks] : denies difficulty handling complex tasks [Reasoning] : denies difficulty with reasoning [High Risk Behavior] : no high risk behavior [Reports changes in hearing] : Reports no changes in hearing [Reports changes in vision] : Reports no changes in vision [Reports changes in dental health] : Reports no changes in dental health [MammogramDate] : 12/21 [MammogramComments] : [facility] - Dr. Cornell Sadler .  Going Dec 2022.  [facility].  Saw Dr. Sanchez. [PapSmearDate] : 2020 [PapSmearComments] : Dr. Nikkie Norton [BoneDensityDate] : 2020 [BoneDensityComments] : Osteoporosis in 1 area.  Dr. Aman Lozada   Not on treatment. [ColonoscopyDate] : 2020 [ColonoscopyComments] : Dr. Boyer.  No polyps.  Dr. 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 [de-identified] : Drives a car. [de-identified] : Glasses, Nearsighted. Dr. Cedric Tejada [de-identified] : Going regularly. [AdvancecareDate] : 10/15/2021 [FreeTextEntry4] : Patient reports having a written Health Care Proxy and/or Living Will and will forward a copy.

## 2022-10-31 NOTE — ASSESSMENT
[FreeTextEntry1] : (1) HCM - discussed diet, exercise, weight maintenance.  Labs ordered in office as below.  Hepatitis C screening negative 4/10/17.  HIV testing offered to patient and patient declined.  Patient has received 3 doses of the Pfizer COVID-19 vaccination and the bivalent booster.  She declines influenza vaccination and Prevnar-20, but will return for these.  She declines the Shingrix at this time.  Continue annual screening mammogram (and breast US) as well as Gyn visits as directed.  Patient reports a colonoscopy Dr. Bertrand Conner and will call for report.  She reports a paternal uncle with rectal cancer.  Continue Gyn as directed.  Bone density done through Endocrine, and showed osteoporosis, and patient declines medication.  Patient reports that she has written advance directives and I asked her to forward a copy to me.\par \par (2) CV - ECG is NSR.  Patient has had intermittent palpitation, and has seen cardiology (Dr. Joel Goldberg, and then Dr. Lucero) for this.  She uses Slo-Mg when needed, and has not been needing a beta blocker.  She is also on Crestor for hyperlipidemia.\par \par (3) Vitamin D insufficiency - check level today.\par \par (4) GI - patient with improved reflux and managing it off medication at this point.\par \par (5) Allergy - patient saw Dr. Boxer's in 2017.  Patient not found to have specific allergies on testing, although she is staying off the PPI's and H2 blockers.

## 2022-10-31 NOTE — PHYSICAL EXAM
[No Acute Distress] : no acute distress [Well Nourished] : well nourished [Well Developed] : well developed [Well-Appearing] : well-appearing [Normal Voice/Communication] : normal voice/communication [Normal Sclera/Conjunctiva] : normal sclera/conjunctiva [PERRL] : pupils equal round and reactive to light [EOMI] : extraocular movements intact [Normal Outer Ear/Nose] : the outer ears and nose were normal in appearance [Normal Oropharynx] : the oropharynx was normal [No JVD] : no jugular venous distention [No Lymphadenopathy] : no lymphadenopathy [Supple] : supple [Thyroid Normal, No Nodules] : the thyroid was normal and there were no nodules present [No Respiratory Distress] : no respiratory distress  [No Accessory Muscle Use] : no accessory muscle use [Clear to Auscultation] : lungs were clear to auscultation bilaterally [Normal Rate] : normal rate  [Regular Rhythm] : with a regular rhythm [Normal S1, S2] : normal S1 and S2 [No Murmur] : no murmur heard [No Carotid Bruits] : no carotid bruits [No Abdominal Bruit] : a ~M bruit was not heard ~T in the abdomen [No Varicosities] : no varicosities [Pedal Pulses Present] : the pedal pulses are present [No Edema] : there was no peripheral edema [No Palpable Aorta] : no palpable aorta [No Extremity Clubbing/Cyanosis] : no extremity clubbing/cyanosis [Normal Appearance] : normal in appearance [No Nipple Discharge] : no nipple discharge [No Axillary Lymphadenopathy] : no axillary lymphadenopathy [Soft] : abdomen soft [Non Tender] : non-tender [Non-distended] : non-distended [No Masses] : no abdominal mass palpated [No HSM] : no HSM [Normal Bowel Sounds] : normal bowel sounds [Normal Posterior Cervical Nodes] : no posterior cervical lymphadenopathy [Normal Anterior Cervical Nodes] : no anterior cervical lymphadenopathy [No CVA Tenderness] : no CVA  tenderness [No Spinal Tenderness] : no spinal tenderness [No Joint Swelling] : no joint swelling [Grossly Normal Strength/Tone] : grossly normal strength/tone [No Rash] : no rash [Coordination Grossly Intact] : coordination grossly intact [No Focal Deficits] : no focal deficits [Normal Gait] : normal gait [Deep Tendon Reflexes (DTR)] : deep tendon reflexes were 2+ and symmetric [Normal Affect] : the affect was normal [Normal Insight/Judgement] : insight and judgment were intact

## 2022-10-31 NOTE — HISTORY OF PRESENT ILLNESS
[Pregnancy History] : pregnancy history: [Total Preg ___] : [unfilled] [Full Term ___] : [unfilled] [Premature ___] : [unfilled] [Abortions ___] : [unfilled] [Living ___] : [unfilled] [AB Induced ___] : elective abortions: [unfilled] [AB Spont ___] : miscarriages: [unfilled] [de-identified] : Patient presents for a follow-up annual physical.  Patient is a 67 year old female with a history of obesity, hyperlipidemia, osteoporosis, GERD.  She declines medication for osteoporosis and discussed this with her Endocrinologist.  She does resistance training.  She saw Dr. Lucero (), who recommended taking the Crestor daily (although she still does every other day).\par \par Patient tripped on her thongs at her son's house in 2020.  She hit her face on a brick deck.  She had 17 stitches over the vicinity of her philtrum, and had to have root canal on 4 teeth as well as caps.  She also fractured her right wrist and right elbow (no surgery needed).  She also had to see a TMJ specialist.  She is using the Crestor every other day.\par \par Patient will be having Moh's Surgery 11/11/2022 at  Dermatology for a squamous cell ca. R upper arm.\par \par She had the bivalent Pfizer COVID-19 vaccine 10/27/2022.  She declines pneumococcal vaccination today, but will return for this.  She declines Shingrix.

## 2022-11-14 LAB
25(OH)D3 SERPL-MCNC: 36.6 NG/ML
ALBUMIN SERPL ELPH-MCNC: 4.7 G/DL
ALP BLD-CCNC: 96 U/L
ALT SERPL-CCNC: 23 U/L
ANION GAP SERPL CALC-SCNC: 12 MMOL/L
APPEARANCE: CLEAR
AST SERPL-CCNC: 18 U/L
BACTERIA: NEGATIVE
BASOPHILS # BLD AUTO: 0.05 K/UL
BASOPHILS NFR BLD AUTO: 0.6 %
BILIRUB SERPL-MCNC: 0.3 MG/DL
BILIRUBIN URINE: NEGATIVE
BLOOD URINE: NEGATIVE
BUN SERPL-MCNC: 19 MG/DL
CALCIUM SERPL-MCNC: 9.8 MG/DL
CHLORIDE SERPL-SCNC: 104 MMOL/L
CHOLEST SERPL-MCNC: 191 MG/DL
CO2 SERPL-SCNC: 24 MMOL/L
COLOR: YELLOW
CREAT SERPL-MCNC: 0.73 MG/DL
EGFR: 90 ML/MIN/1.73M2
EOSINOPHIL # BLD AUTO: 0.19 K/UL
EOSINOPHIL NFR BLD AUTO: 2.1 %
ESTIMATED AVERAGE GLUCOSE: 120 MG/DL
GLUCOSE QUALITATIVE U: NEGATIVE
GLUCOSE SERPL-MCNC: 93 MG/DL
HBA1C MFR BLD HPLC: 5.8 %
HCT VFR BLD CALC: 43.6 %
HDLC SERPL-MCNC: 50 MG/DL
HGB BLD-MCNC: 13.8 G/DL
HYALINE CASTS: 1 /LPF
IMM GRANULOCYTES NFR BLD AUTO: 0.2 %
KETONES URINE: NEGATIVE
LDLC SERPL CALC-MCNC: 123 MG/DL
LEUKOCYTE ESTERASE URINE: NEGATIVE
LYMPHOCYTES # BLD AUTO: 4.26 K/UL
LYMPHOCYTES NFR BLD AUTO: 47.5 %
MAN DIFF?: NORMAL
MCHC RBC-ENTMCNC: 30.7 PG
MCHC RBC-ENTMCNC: 31.7 GM/DL
MCV RBC AUTO: 97.1 FL
MICROSCOPIC-UA: NORMAL
MONOCYTES # BLD AUTO: 0.75 K/UL
MONOCYTES NFR BLD AUTO: 8.4 %
NEUTROPHILS # BLD AUTO: 3.7 K/UL
NEUTROPHILS NFR BLD AUTO: 41.2 %
NITRITE URINE: NEGATIVE
NONHDLC SERPL-MCNC: 141 MG/DL
PH URINE: 6
PLATELET # BLD AUTO: 293 K/UL
POTASSIUM SERPL-SCNC: 4.7 MMOL/L
PROT SERPL-MCNC: 7.1 G/DL
PROTEIN URINE: NORMAL
RBC # BLD: 4.49 M/UL
RBC # FLD: 12.6 %
RED BLOOD CELLS URINE: 3 /HPF
SODIUM SERPL-SCNC: 141 MMOL/L
SPECIFIC GRAVITY URINE: 1.03
SQUAMOUS EPITHELIAL CELLS: 1 /HPF
T4 FREE SERPL-MCNC: 1.1 NG/DL
TRIGL SERPL-MCNC: 93 MG/DL
TSH SERPL-ACNC: 1.61 UIU/ML
UROBILINOGEN URINE: NORMAL
VIT B12 SERPL-MCNC: 958 PG/ML
WBC # FLD AUTO: 8.97 K/UL
WHITE BLOOD CELLS URINE: 1 /HPF

## 2022-11-17 ENCOUNTER — NON-APPOINTMENT (OUTPATIENT)
Age: 67
End: 2022-11-17

## 2022-12-13 ENCOUNTER — APPOINTMENT (OUTPATIENT)
Dept: INTERNAL MEDICINE | Facility: CLINIC | Age: 67
End: 2022-12-13

## 2023-01-09 NOTE — ED ADULT TRIAGE NOTE - NS ED NURSE BANDS TYPE
[address] [facility] Adult  Hospitalist Group                                                                                    Hospitalist Progress Note  Buddy BENTLEY NP        Date of Service:  2023  NAME:  Eliz Carreon  :  1953  MRN:  [id number]    Admission Summary:   Ms. Jenn Monte is a 71 y.o. female with pmhx of htn, hyperlipidemia, hypothyroidism, type 2 diabetes mellitus, restless leg syndrome, obesity, BLESSING, glaucoma presented to the ED via EMS with chief complaint of right shoulder pain status post GLFl. Pt reportedly tripped and fell while walking upstairs, landing on her R shoulder and L knee with subsequent pain in her R arm, L/R  right knee. Rt shoulder pain reported with severe, constant, aching, rates a 10 out of 10, aggravated with any movement or touch, without specific alleviating factors. No reported head or neck trauma. Xray R shoulder AP and lateral x-ray showed markedly comminuted and slightly displaced humerus fracture involving the mid humerus. L and R knee x-rays negative for acute fracture. Plans for patient to have surgery this weekend. Interval history / Subjective:      POD #1  Had some hypotension this am, became diaphoretic, dizzy and sob, now resolved. Reporting intermittent sob since 2022 since she had covid-19  Appears to have gone into A-fib close to 5 am, no previous hx. 12 lead ekg with NSR.   Cardiology consulted  Nurses to d/c irsa  Needs to work with PT/OT when able    Assessment & Plan:     Closed fracture of shaft of right humerus/Pain  - Xray R shoulder AP and lateral x-ray showed markedly comminuted and slightly displaced humerus fracture involving the mid humerus  - orthopedic surgery service consulted, plan for surgery today  - s/p ORIF of right proximal humerus fracture on  by Dr. Jailyn Cash  - sling to RUE for immobilization  - pain mgmt with meds/ice  - PT/OT     Afib- no hx previously  - noted on tele  - Cee King V, NP Name band;

## 2023-02-27 ENCOUNTER — APPOINTMENT (OUTPATIENT)
Dept: INTERNAL MEDICINE | Facility: CLINIC | Age: 68
End: 2023-02-27
Payer: MEDICARE

## 2023-02-27 VITALS
OXYGEN SATURATION: 98 % | TEMPERATURE: 97.2 F | BODY MASS INDEX: 30.36 KG/M2 | WEIGHT: 165 LBS | DIASTOLIC BLOOD PRESSURE: 70 MMHG | HEART RATE: 74 BPM | HEIGHT: 62 IN | SYSTOLIC BLOOD PRESSURE: 120 MMHG

## 2023-02-27 DIAGNOSIS — R22.1 LOCALIZED SWELLING, MASS AND LUMP, NECK: ICD-10-CM

## 2023-02-27 PROCEDURE — 99213 OFFICE O/P EST LOW 20 MIN: CPT | Mod: 25

## 2023-02-27 NOTE — ASSESSMENT
[FreeTextEntry1] : Patient's chiropractor noted a neck fullness.  Patient's neck exam is normal.  She has no symptoms of hypo- or hyperthyroidism.  She did have a recent sinus infection that was treated.  Will hold on imaging, and patient can observe the neck, and contact me if she feels any new swellings.\par \par Patient reminded to get Prevnar-20 eventually.  She declines today.

## 2023-02-27 NOTE — HISTORY OF PRESENT ILLNESS
[de-identified] : Patient's chiropractor felt ? nodules on the left lateral neck.  She was having a tooth issue, and also had pain in the chin.  She had a Z-pack from her ENT doctor for sinus pains.  She recently went to the [place] and returned home 2/25/2023.  No fevers, chills, body aches, loss of taste or smell, sore throat, or new GI symptoms.  She does not appreciate any swelling of the neck or enlarged LN on her own.

## 2023-03-15 ENCOUNTER — NON-APPOINTMENT (OUTPATIENT)
Age: 68
End: 2023-03-15

## 2023-03-21 ENCOUNTER — APPOINTMENT (OUTPATIENT)
Dept: INTERNAL MEDICINE | Facility: CLINIC | Age: 68
End: 2023-03-21
Payer: MEDICARE

## 2023-03-21 VITALS
TEMPERATURE: 97.3 F | HEIGHT: 62 IN | OXYGEN SATURATION: 96 % | BODY MASS INDEX: 30.36 KG/M2 | HEART RATE: 96 BPM | SYSTOLIC BLOOD PRESSURE: 118 MMHG | DIASTOLIC BLOOD PRESSURE: 80 MMHG | WEIGHT: 165 LBS

## 2023-03-21 DIAGNOSIS — J32.9 CHRONIC SINUSITIS, UNSPECIFIED: ICD-10-CM

## 2023-03-21 PROCEDURE — 99214 OFFICE O/P EST MOD 30 MIN: CPT | Mod: 25

## 2023-03-21 RX ORDER — AZELASTINE HYDROCHLORIDE 205.5 UG/1
0.15 SPRAY, METERED NASAL
Qty: 1 | Refills: 1 | Status: ACTIVE | COMMUNITY
Start: 2022-01-12 | End: 1900-01-01

## 2023-03-22 PROBLEM — J32.9 CHRONIC SINUSITIS: Status: ACTIVE | Noted: 2023-03-21

## 2023-03-22 NOTE — HISTORY OF PRESENT ILLNESS
[FreeTextEntry8] : Before February break, patient had tooth pain, and went to several doctors (dentists, oral surgeons, ENT).  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  She also saw ENT (Dr. Evaristo Ramírez).  Dr. Ramírez recommended a Z-pack.  Patient had an upcoming trip to the [place], and felt well enough to go.  Her spouse had a URI as well.  Patient's mother passed away 3/4/2023.  While she was sitting shiva, her sore throat was getting worse.  She felt congested.  She was supposed to get the tooth taken out, but this was delayed due to her illnesses and her mother's passing.  She went back to Dr. Ramírez last week, and he told the patient she has chronic sinusitis, and gave her a 2nd Z-pack, which she finished yesterday.  She went to the oral surgeon today, and a 3D scan showed how her R sinus was clear and L sinus was clogged.  She was advised to get a different antibiotic.  He recommended doxycycline.  Her ENT (Dr. Ramírez) is on vacation this week.  She is taking Flonase also.

## 2023-03-22 NOTE — ASSESSMENT
[FreeTextEntry1] : Patient with chronic sinusitis, and was not responding to azithromycin.  Will give patient doxycycline, but I advised her to still follow-up with her ENT when he returns.  She should contact me for persistent diarrhea, as we would need to test for C. difficile.

## 2023-04-11 ENCOUNTER — APPOINTMENT (OUTPATIENT)
Dept: CARDIOLOGY | Facility: CLINIC | Age: 68
End: 2023-04-11
Payer: MEDICARE

## 2023-04-11 ENCOUNTER — NON-APPOINTMENT (OUTPATIENT)
Age: 68
End: 2023-04-11

## 2023-04-11 VITALS
WEIGHT: 165 LBS | SYSTOLIC BLOOD PRESSURE: 110 MMHG | HEART RATE: 83 BPM | BODY MASS INDEX: 30.36 KG/M2 | OXYGEN SATURATION: 95 % | DIASTOLIC BLOOD PRESSURE: 78 MMHG | HEIGHT: 62 IN

## 2023-04-11 PROCEDURE — 99214 OFFICE O/P EST MOD 30 MIN: CPT | Mod: 25

## 2023-04-11 PROCEDURE — 93000 ELECTROCARDIOGRAM COMPLETE: CPT

## 2023-04-11 RX ORDER — ROSUVASTATIN CALCIUM 5 MG/1
5 TABLET, FILM COATED ORAL DAILY
Qty: 90 | Refills: 3 | Status: ACTIVE | COMMUNITY
Start: 2021-08-16 | End: 1900-01-01

## 2023-04-11 RX ORDER — DOXYCYCLINE HYCLATE 100 MG/1
100 TABLET ORAL
Qty: 14 | Refills: 0 | Status: DISCONTINUED | COMMUNITY
Start: 2023-03-21 | End: 2023-04-11

## 2023-04-11 NOTE — DISCUSSION/SUMMARY
[FreeTextEntry1] : HLD - continue Crestor at her current dose.  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 to re-assess.\par \par Palps - quiescent at this time.\par \par 31  minutes spent on today's office visit. \par \par I asked her to return here in 4 months. [EKG obtained to assist in diagnosis and management of assessed problem(s)] : EKG obtained to assist in diagnosis and management of assessed problem(s)

## 2023-04-11 NOTE — PHYSICAL EXAM
[Well Developed] : well developed [Well Nourished] : well nourished [No Acute Distress] : no acute distress [Normal Conjunctiva] : normal conjunctiva [No Carotid Bruit] : no carotid bruit [Normal Venous Pressure] : normal venous pressure [Normal S1, S2] : normal S1, S2 [No Murmur] : no murmur [No Rub] : no rub [No Gallop] : no gallop [Clear Lung Fields] : clear lung fields [Good Air Entry] : good air entry [No Respiratory Distress] : no respiratory distress  [Soft] : abdomen soft [Non Tender] : non-tender [No Masses/organomegaly] : no masses/organomegaly [Normal Bowel Sounds] : normal bowel sounds [Normal Gait] : normal gait [No Edema] : no edema [No Cyanosis] : no cyanosis [No Clubbing] : no clubbing [No Rash] : no rash [No Varicosities] : no varicosities [No Skin Lesions] : no skin lesions [Moves all extremities] : moves all extremities [No Focal Deficits] : no focal deficits [Normal Speech] : normal speech [Alert and Oriented] : alert and oriented [Normal memory] : normal memory

## 2023-04-11 NOTE — HISTORY OF PRESENT ILLNESS
[FreeTextEntry1] : She has a family history of heart disease.  Her father required CABG at approx. age 60; he  at 72 of a myocardial infarction.  Her paternal grandfather also had heart disease; he  at approx. age 50 of an MI.  Her mother also has heart disease, late in life; she had a SAVR at age 79 and also has a hx. of PPM and recent small heart attack.   Her mother recently passed away at age 97.\par \par Emily has a history of HLD, treated with Crestor.  She reports no history of hypertension or diabetes.  She has never been a cigarette smoker.\par \par As she returns today, she continues her usual activities and remains well.  She reports no episodes of CP or significant IGNACIO.  There have been no recent palps.  \par \par There have been no new interval medical problems. Medications are unchanged.

## 2023-04-12 LAB
ALBUMIN SERPL ELPH-MCNC: 4.5 G/DL
ALP BLD-CCNC: 91 U/L
ALT SERPL-CCNC: 22 U/L
AST SERPL-CCNC: 20 U/L
BILIRUB DIRECT SERPL-MCNC: 0.1 MG/DL
BILIRUB INDIRECT SERPL-MCNC: 0.2 MG/DL
BILIRUB SERPL-MCNC: 0.2 MG/DL
CHOLEST SERPL-MCNC: 197 MG/DL
HDLC SERPL-MCNC: 53 MG/DL
LDLC SERPL CALC-MCNC: 109 MG/DL
LDLC SERPL DIRECT ASSAY-MCNC: 118 MG/DL
NONHDLC SERPL-MCNC: 144 MG/DL
PROT SERPL-MCNC: 7 G/DL
TRIGL SERPL-MCNC: 175 MG/DL

## 2023-06-28 ENCOUNTER — APPOINTMENT (OUTPATIENT)
Dept: INTERNAL MEDICINE | Facility: CLINIC | Age: 68
End: 2023-06-28
Payer: MEDICARE

## 2023-06-28 DIAGNOSIS — L03.213 PERIORBITAL CELLULITIS: ICD-10-CM

## 2023-06-28 PROCEDURE — 99442: CPT | Mod: 95

## 2023-06-30 ENCOUNTER — NON-APPOINTMENT (OUTPATIENT)
Age: 68
End: 2023-06-30

## 2023-06-30 ENCOUNTER — APPOINTMENT (OUTPATIENT)
Dept: OPHTHALMOLOGY | Facility: CLINIC | Age: 68
End: 2023-06-30
Payer: MEDICARE

## 2023-06-30 PROCEDURE — 92002 INTRM OPH EXAM NEW PATIENT: CPT

## 2023-07-03 ENCOUNTER — APPOINTMENT (OUTPATIENT)
Dept: INTERNAL MEDICINE | Facility: CLINIC | Age: 68
End: 2023-07-03
Payer: MEDICARE

## 2023-07-03 VITALS
OXYGEN SATURATION: 96 % | WEIGHT: 165 LBS | BODY MASS INDEX: 30.36 KG/M2 | DIASTOLIC BLOOD PRESSURE: 80 MMHG | HEART RATE: 93 BPM | SYSTOLIC BLOOD PRESSURE: 130 MMHG | TEMPERATURE: 97.3 F | HEIGHT: 62 IN

## 2023-07-03 VITALS — DIASTOLIC BLOOD PRESSURE: 68 MMHG | SYSTOLIC BLOOD PRESSURE: 115 MMHG

## 2023-07-03 DIAGNOSIS — H00.19 CHALAZION UNSPECIFIED EYE, UNSPECIFIED EYELID: ICD-10-CM

## 2023-07-03 PROCEDURE — 99213 OFFICE O/P EST LOW 20 MIN: CPT

## 2023-07-03 RX ORDER — CIPROFLOXACIN HYDROCHLORIDE 500 MG/1
500 TABLET, FILM COATED ORAL
Qty: 14 | Refills: 0 | Status: COMPLETED | COMMUNITY
Start: 2023-06-28

## 2023-07-03 RX ORDER — IBUPROFEN 600 MG/1
600 TABLET, FILM COATED ORAL
Qty: 20 | Refills: 0 | Status: COMPLETED | COMMUNITY
Start: 2023-06-16

## 2023-07-07 ENCOUNTER — APPOINTMENT (OUTPATIENT)
Dept: OPHTHALMOLOGY | Facility: CLINIC | Age: 68
End: 2023-07-07

## 2023-07-07 ENCOUNTER — APPOINTMENT (OUTPATIENT)
Dept: OPHTHALMOLOGY | Facility: CLINIC | Age: 68
End: 2023-07-07
Payer: MEDICARE

## 2023-07-07 ENCOUNTER — NON-APPOINTMENT (OUTPATIENT)
Age: 68
End: 2023-07-07

## 2023-07-07 PROCEDURE — 92285 EXTERNAL OCULAR PHOTOGRAPHY: CPT

## 2023-07-07 PROCEDURE — 99213 OFFICE O/P EST LOW 20 MIN: CPT

## 2023-07-18 ENCOUNTER — NON-APPOINTMENT (OUTPATIENT)
Age: 68
End: 2023-07-18

## 2023-07-18 ENCOUNTER — APPOINTMENT (OUTPATIENT)
Dept: OPHTHALMOLOGY | Facility: CLINIC | Age: 68
End: 2023-07-18
Payer: MEDICARE

## 2023-07-18 PROCEDURE — 92285 EXTERNAL OCULAR PHOTOGRAPHY: CPT

## 2023-07-18 PROCEDURE — 99213 OFFICE O/P EST LOW 20 MIN: CPT

## 2023-08-14 ENCOUNTER — APPOINTMENT (OUTPATIENT)
Dept: INTERNAL MEDICINE | Facility: CLINIC | Age: 68
End: 2023-08-14
Payer: MEDICARE

## 2023-08-14 DIAGNOSIS — S72.433A: ICD-10-CM

## 2023-08-14 PROCEDURE — 99441: CPT | Mod: 95

## 2023-08-29 ENCOUNTER — NON-APPOINTMENT (OUTPATIENT)
Age: 68
End: 2023-08-29

## 2023-08-29 ENCOUNTER — APPOINTMENT (OUTPATIENT)
Dept: OPHTHALMOLOGY | Facility: CLINIC | Age: 68
End: 2023-08-29
Payer: MEDICARE

## 2023-08-29 PROCEDURE — 99213 OFFICE O/P EST LOW 20 MIN: CPT

## 2023-08-29 PROCEDURE — 92285 EXTERNAL OCULAR PHOTOGRAPHY: CPT

## 2023-09-08 PROBLEM — H00.19 CHALAZION: Status: ACTIVE | Noted: 2023-09-08

## 2023-09-08 NOTE — PHYSICAL EXAM
[Normal Sclera/Conjunctiva] : normal sclera/conjunctiva [de-identified] : L upper eyelid erythema and mild swelling.

## 2023-09-08 NOTE — ASSESSMENT
[FreeTextEntry1] : Patient with chalazion.  She has follow-up with several ophthalmologists.  She has completed a course of antibiotics for periorbital cellulitis (although it was felt by Dr. Horton that it was a chalazion), and she is on Maxitrol.  Follow-up with me as needed.

## 2023-09-08 NOTE — HISTORY OF PRESENT ILLNESS
[de-identified] : Patient's eye is now significantly improved.  She had 1 dose of Cipro.  Her ophthalmologist told the patient that she had pre-septal cellulitis.  Patient spoke to her hew nephew Osiel Mcdonough, who spoke with an infectious disease physician.  Patient now has a new ointment.  She will be going to Dr. Ramirez (another ophthalmologist) for further input.  Her ophthalmologist Dr. Horton felt pt can finish the Levaquin.  Dr. Horton felt patient had a chalazion and gave Maxitrol.

## 2023-09-26 ENCOUNTER — APPOINTMENT (OUTPATIENT)
Dept: CARDIOLOGY | Facility: CLINIC | Age: 68
End: 2023-09-26
Payer: MEDICARE

## 2023-09-26 ENCOUNTER — NON-APPOINTMENT (OUTPATIENT)
Age: 68
End: 2023-09-26

## 2023-09-26 VITALS
SYSTOLIC BLOOD PRESSURE: 106 MMHG | DIASTOLIC BLOOD PRESSURE: 60 MMHG | HEIGHT: 62 IN | OXYGEN SATURATION: 97 % | WEIGHT: 163 LBS | BODY MASS INDEX: 30 KG/M2 | HEART RATE: 83 BPM

## 2023-09-26 PROCEDURE — 99214 OFFICE O/P EST MOD 30 MIN: CPT

## 2023-09-26 PROCEDURE — 36415 COLL VENOUS BLD VENIPUNCTURE: CPT

## 2023-09-26 PROCEDURE — 93306 TTE W/DOPPLER COMPLETE: CPT

## 2023-09-26 PROCEDURE — 93000 ELECTROCARDIOGRAM COMPLETE: CPT

## 2023-09-27 LAB
25(OH)D3 SERPL-MCNC: 66.2 NG/ML
ALBUMIN SERPL ELPH-MCNC: 4.6 G/DL
ALP BLD-CCNC: 106 U/L
ALT SERPL-CCNC: 17 U/L
ANION GAP SERPL CALC-SCNC: 11 MMOL/L
AST SERPL-CCNC: 16 U/L
BILIRUB SERPL-MCNC: 0.2 MG/DL
BUN SERPL-MCNC: 24 MG/DL
CALCIUM SERPL-MCNC: 9.9 MG/DL
CHLORIDE SERPL-SCNC: 105 MMOL/L
CHOLEST SERPL-MCNC: 174 MG/DL
CO2 SERPL-SCNC: 26 MMOL/L
CREAT SERPL-MCNC: 0.77 MG/DL
EGFR: 84 ML/MIN/1.73M2
ESTIMATED AVERAGE GLUCOSE: 120 MG/DL
GLUCOSE SERPL-MCNC: 90 MG/DL
HBA1C MFR BLD HPLC: 5.8 %
HCT VFR BLD CALC: 38.3 %
HDLC SERPL-MCNC: 51 MG/DL
HGB BLD-MCNC: 11.9 G/DL
LDLC SERPL CALC-MCNC: 100 MG/DL
LDLC SERPL DIRECT ASSAY-MCNC: 100 MG/DL
MCHC RBC-ENTMCNC: 29.1 PG
MCHC RBC-ENTMCNC: 31.1 GM/DL
MCV RBC AUTO: 93.6 FL
NONHDLC SERPL-MCNC: 123 MG/DL
PLATELET # BLD AUTO: 364 K/UL
POTASSIUM SERPL-SCNC: 4.8 MMOL/L
PROT SERPL-MCNC: 7.2 G/DL
RBC # BLD: 4.09 M/UL
RBC # FLD: 13 %
SODIUM SERPL-SCNC: 141 MMOL/L
T3 SERPL-MCNC: 105 NG/DL
T4 SERPL-MCNC: 7.7 UG/DL
TRIGL SERPL-MCNC: 129 MG/DL
TSH SERPL-ACNC: 2 UIU/ML
WBC # FLD AUTO: 11.18 K/UL

## 2023-10-23 ENCOUNTER — NON-APPOINTMENT (OUTPATIENT)
Age: 68
End: 2023-10-23

## 2023-11-15 ENCOUNTER — NON-APPOINTMENT (OUTPATIENT)
Age: 68
End: 2023-11-15

## 2023-11-15 ENCOUNTER — APPOINTMENT (OUTPATIENT)
Dept: OPHTHALMOLOGY | Facility: CLINIC | Age: 68
End: 2023-11-15
Payer: MEDICARE

## 2023-11-15 PROCEDURE — 92014 COMPRE OPH EXAM EST PT 1/>: CPT

## 2023-11-21 ENCOUNTER — APPOINTMENT (OUTPATIENT)
Dept: INTERNAL MEDICINE | Facility: CLINIC | Age: 68
End: 2023-11-21
Payer: MEDICARE

## 2023-11-21 VITALS
HEIGHT: 62 IN | HEART RATE: 94 BPM | OXYGEN SATURATION: 97 % | WEIGHT: 163 LBS | DIASTOLIC BLOOD PRESSURE: 70 MMHG | SYSTOLIC BLOOD PRESSURE: 132 MMHG | TEMPERATURE: 97.2 F | BODY MASS INDEX: 30 KG/M2

## 2023-11-21 DIAGNOSIS — Z00.00 ENCOUNTER FOR GENERAL ADULT MEDICAL EXAMINATION W/OUT ABNORMAL FINDINGS: ICD-10-CM

## 2023-11-21 DIAGNOSIS — K21.9 GASTRO-ESOPHAGEAL REFLUX DISEASE W/OUT ESOPHAGITIS: ICD-10-CM

## 2023-11-21 DIAGNOSIS — E66.9 OBESITY, UNSPECIFIED: ICD-10-CM

## 2023-11-21 PROCEDURE — G0439: CPT

## 2023-11-21 RX ORDER — LEVOFLOXACIN 500 MG/1
500 TABLET, FILM COATED ORAL DAILY
Qty: 7 | Refills: 0 | Status: COMPLETED | COMMUNITY
Start: 2023-06-28 | End: 2023-11-21

## 2023-11-22 ENCOUNTER — NON-APPOINTMENT (OUTPATIENT)
Age: 68
End: 2023-11-22

## 2023-11-22 LAB
APPEARANCE: CLEAR
BACTERIA: NEGATIVE /HPF
BILIRUBIN URINE: NEGATIVE
BLOOD URINE: NEGATIVE
CAST: 0 /LPF
COLOR: YELLOW
EPITHELIAL CELLS: 3 /HPF
GLUCOSE QUALITATIVE U: NEGATIVE MG/DL
KETONES URINE: NEGATIVE MG/DL
LEUKOCYTE ESTERASE URINE: NEGATIVE
MICROSCOPIC-UA: NORMAL
NITRITE URINE: NEGATIVE
PH URINE: 5.5
PROTEIN URINE: NEGATIVE MG/DL
RAPID RVP RESULT: NOT DETECTED
RED BLOOD CELLS URINE: 0 /HPF
SARS-COV-2 RNA PNL RESP NAA+PROBE: NOT DETECTED
SPECIFIC GRAVITY URINE: 1.02
UROBILINOGEN URINE: 0.2 MG/DL
WHITE BLOOD CELLS URINE: 1 /HPF

## 2023-11-28 ENCOUNTER — NON-APPOINTMENT (OUTPATIENT)
Age: 68
End: 2023-11-28

## 2023-11-28 ENCOUNTER — APPOINTMENT (OUTPATIENT)
Dept: ORTHOPEDIC SURGERY | Facility: CLINIC | Age: 68
End: 2023-11-28
Payer: MEDICARE

## 2023-11-28 VITALS — BODY MASS INDEX: 29.63 KG/M2 | HEIGHT: 62 IN | WEIGHT: 161 LBS

## 2023-11-28 PROCEDURE — 73564 X-RAY EXAM KNEE 4 OR MORE: CPT | Mod: LT

## 2023-11-28 PROCEDURE — 99204 OFFICE O/P NEW MOD 45 MIN: CPT

## 2023-12-20 ENCOUNTER — APPOINTMENT (OUTPATIENT)
Dept: INTERNAL MEDICINE | Facility: CLINIC | Age: 68
End: 2023-12-20
Payer: MEDICARE

## 2023-12-20 DIAGNOSIS — R59.0 LOCALIZED ENLARGED LYMPH NODES: ICD-10-CM

## 2023-12-20 PROCEDURE — 99441: CPT | Mod: 95

## 2023-12-22 ENCOUNTER — APPOINTMENT (OUTPATIENT)
Dept: ORTHOPEDIC SURGERY | Facility: CLINIC | Age: 68
End: 2023-12-22
Payer: MEDICARE

## 2023-12-22 VITALS — WEIGHT: 161 LBS | BODY MASS INDEX: 29.63 KG/M2 | HEIGHT: 62 IN

## 2023-12-22 PROCEDURE — 99214 OFFICE O/P EST MOD 30 MIN: CPT | Mod: 25

## 2023-12-22 PROCEDURE — 20610 DRAIN/INJ JOINT/BURSA W/O US: CPT | Mod: LT

## 2023-12-22 NOTE — REASON FOR VISIT
[Initial Visit] : an initial visit for [No Fault] : This visit is related to no fault  [Knee Pain] : knee pain

## 2023-12-23 NOTE — PHYSICAL EXAM
[Normal RLE] : Right Lower Extremity: No scars, rashes, lesions, ulcers, skin intact [Normal LLE] : Left Lower Extremity: No scars, rashes, lesions, ulcers, skin intact [Normal Touch] : sensation intact for touch [Normal] : No swelling, no edema, normal pedal pulses and normal temperature [de-identified] : Left Lower Extremity o Knee :  Inspection/Palpation : no medial joint line tenderness to palpation, no lateral joint line tenderness, 1+ effusion, no deformity  Range of Motion : 0 -110 degrees, no crepitus  Stability : no valgus or varus instability present on provocative testing, Lachmans Test (-)  Strength : flexion and extension 5/5 o Muscle Bulk : normal muscle bulk present o Skin : no erythema, no ecchymosis o Sensation : sensation to pin intact o Vascular Exam : no edema, no cyanosis, dorsalis pedis artery pulse 2+, posterior tibial artery pulse 2+ [de-identified] : _________________________ XR obtained on 11/28/2023 :  o LEFT Knee : AP, lateral, sunrise, and Conner views of the knee were obtained, there are no soft tissue abnormalities, no fractures, Mild to moderate osteoarthritis with degenerative narrowing of the medial joint, normal bone density, no bony lesions, collapse of the medial femoral condyle.  _________________________ Patient comes to today's visit with outside imaging already performed. I reviewed the images in detail with the patient and discussed the findings as highlighted below.  MRI of the left knee obtained on 08/03/2023 that demonstrated a complex, radial tearing of the posterior root of the medial meniscus with secondary medial extrusion of the meniscal body and mild underlying degeneration. Subchondral insufficiency fracture of the central, weightbearing medial femoral condyle with significant bone marrow edema. Tricompartmental degenerative changes, most pronounced in the medial compartment. Moderate to large joint effusion.

## 2023-12-23 NOTE — DISCUSSION/SUMMARY
[de-identified] : The underlying pathophysiology was reviewed in great detail with the patient as well as the various treatment options, including ice, analgesics, NSAIDs, Physical therapy, steroid injections, viscosupplementation injections.   Activity modifications and restrictions were discussed. I advised avoiding deep bending, squatting and high intensity activity.  A home exercise sheet was given and discussed with the patient to follow.  I have recommended utilizing a knee sleeve to provide added support and stability.  "The patient elected to receive a cortisone injection into her left knee today and tolerated it well. I instructed the patient on ROM exercises, and told them to take it easy. The use of ice and rest was reviewed with the patient. The patient may resume activities in a couple of days.   FU 1 month   All questions were answered, all alternatives discussed and the patient is in complete agreement with that plan. Follow-up appointment as instructed. Any issues and the patient will call or come in sooner.

## 2023-12-23 NOTE — ADDENDUM
[FreeTextEntry1] :  I, Jessika Jefferson, acted as a scribe on behalf of Dr. Shane Newman on 12/22/2023 .   All medical entries made by the scribe were at my, Dr. Shane Newman, direction and personally dictated by me on 12/22/2023 .. I have reviewed the chart and agree that the record accurately reflects my personal performance of the history, physical exam, assessment and plan. I have also personally directed, reviewed, and agreed with the chart.

## 2023-12-23 NOTE — PROCEDURE
[Aspiration] : Aspiration [Injection] : Injection [Left] : of the left [Knee Joint] : knee joint [Patient] : patient [Risk] : risk [Benefits] : benefits [Alternatives] : alternatives [Bleeding] : bleeding [Infection] : infection [Allergic Reaction] : allergic reaction [Verbal Consent Obtained] : verbal consent was obtained prior to the procedure [Same Needle/New Syringe] : the syringe was changed and the same needle was left in place and [Bandage Applied] : a bandage [Tolerated Well] : The patient tolerated the procedure well [None] : none [de-identified] : At this point I recommended a therapeutic injection and under sterile precautions an injection of 5 cc 1% lidocaine with 0.5 cc of Kenalog and 0.5 cc of Dexamethasone - was placed into the joint of the Left knee without complication, and clear synovial fluid was aspirated: 20 cc total fluid removed. After several minutes, the patient felt significant relief.

## 2023-12-23 NOTE — HISTORY OF PRESENT ILLNESS
[de-identified] : AGUILAR BENZ is a 68 year female presenting to the office complaining of left knee pain. Patient reports pain since July 2023. Denies injury or trauma to the area.  She's attended PT for one week and had worsened her symptoms. She presents today with outside MRI imaging obtained on 08/03/2023 that revealed a subchondral insufficiency fracture. She was recommended to utilize a knee brace, which she has been compliant with.  The patient describes the pain as a dull aching, and occasionally sharp pain localized to the medial aspect of her knee that is intermittent in nature. Her symptoms are exacerbated with bending the knee, prolonged activity. Pain is alleviated with rest. Patient denies instability, catching or locking of the knee. Patient is taking NSAIDs for pain relief with mild to moderate relief in symptoms. Today, she states thar she is not wearing her knee brace. She wants to inquire about a cortisone injection.

## 2024-01-05 ENCOUNTER — NON-APPOINTMENT (OUTPATIENT)
Age: 69
End: 2024-01-05

## 2024-01-17 ENCOUNTER — OUTPATIENT (OUTPATIENT)
Dept: OUTPATIENT SERVICES | Facility: HOSPITAL | Age: 69
LOS: 1 days | Discharge: HOME | End: 2024-01-17
Payer: MEDICARE

## 2024-01-17 DIAGNOSIS — C91.10 CHRONIC LYMPHOCYTIC LEUKEMIA OF B-CELL TYPE NOT HAVING ACHIEVED REMISSION: ICD-10-CM

## 2024-01-22 ENCOUNTER — RESULT REVIEW (OUTPATIENT)
Age: 69
End: 2024-01-22

## 2024-01-22 ENCOUNTER — APPOINTMENT (OUTPATIENT)
Dept: HEMATOLOGY ONCOLOGY | Facility: CLINIC | Age: 69
End: 2024-01-22
Payer: MEDICARE

## 2024-01-22 VITALS
HEART RATE: 92 BPM | BODY MASS INDEX: 29.08 KG/M2 | WEIGHT: 160.06 LBS | DIASTOLIC BLOOD PRESSURE: 84 MMHG | SYSTOLIC BLOOD PRESSURE: 126 MMHG | RESPIRATION RATE: 16 BRPM | TEMPERATURE: 97.7 F | OXYGEN SATURATION: 99 % | HEIGHT: 62.01 IN

## 2024-01-22 LAB
BASOPHILS # BLD AUTO: 0 K/UL — SIGNIFICANT CHANGE UP (ref 0–0.2)
BASOPHILS NFR BLD AUTO: 0 % — SIGNIFICANT CHANGE UP (ref 0–2)
ELLIPTOCYTES BLD QL SMEAR: SLIGHT — SIGNIFICANT CHANGE UP
EOSINOPHIL # BLD AUTO: 0.23 K/UL — SIGNIFICANT CHANGE UP (ref 0–0.5)
EOSINOPHIL NFR BLD AUTO: 2 % — SIGNIFICANT CHANGE UP (ref 0–6)
HCT VFR BLD CALC: 28.1 % — LOW (ref 34.5–45)
HGB BLD-MCNC: 8.3 G/DL — LOW (ref 11.5–15.5)
HYPOCHROMIA BLD QL: SLIGHT — SIGNIFICANT CHANGE UP
IGG SER QL IEP: 873 MG/DL
LYMPHOCYTES # BLD AUTO: 44 % — SIGNIFICANT CHANGE UP (ref 13–44)
LYMPHOCYTES # BLD AUTO: 5.06 K/UL — HIGH (ref 1–3.3)
LYMPHOCYTES # SPEC AUTO: 1 % — HIGH (ref 0–0)
MCHC RBC-ENTMCNC: 24.9 PG — LOW (ref 27–34)
MCHC RBC-ENTMCNC: 29.5 G/DL — LOW (ref 32–36)
MCV RBC AUTO: 84.1 FL — SIGNIFICANT CHANGE UP (ref 80–100)
METAMYELOCYTES # FLD: 1 % — HIGH (ref 0–0)
MONOCYTES # BLD AUTO: 0.69 K/UL — SIGNIFICANT CHANGE UP (ref 0–0.9)
MONOCYTES NFR BLD AUTO: 6 % — SIGNIFICANT CHANGE UP (ref 2–14)
NEUTROPHILS # BLD AUTO: 5.29 K/UL — SIGNIFICANT CHANGE UP (ref 1.8–7.4)
NEUTROPHILS NFR BLD AUTO: 46 % — SIGNIFICANT CHANGE UP (ref 43–77)
NRBC # BLD: 0 /100 WBCS — SIGNIFICANT CHANGE UP (ref 0–0)
NRBC # BLD: SIGNIFICANT CHANGE UP /100 WBCS (ref 0–0)
PLAT MORPH BLD: NORMAL — SIGNIFICANT CHANGE UP
PLATELET # BLD AUTO: 474 K/UL — HIGH (ref 150–400)
POIKILOCYTOSIS BLD QL AUTO: SLIGHT — SIGNIFICANT CHANGE UP
POLYCHROMASIA BLD QL SMEAR: SLIGHT — SIGNIFICANT CHANGE UP
RBC # BLD: 3.34 M/UL — LOW (ref 3.8–5.2)
RBC # FLD: 13.8 % — SIGNIFICANT CHANGE UP (ref 10.3–14.5)
RBC BLD AUTO: ABNORMAL
RETICS #: 62.5 K/UL — SIGNIFICANT CHANGE UP (ref 25–125)
RETICS/RBC NFR: 1.9 % — SIGNIFICANT CHANGE UP (ref 0.5–2.5)
SMUDGE CELLS # BLD: PRESENT — SIGNIFICANT CHANGE UP
WBC # BLD: 11.51 K/UL — HIGH (ref 3.8–10.5)
WBC # FLD AUTO: 11.51 K/UL — HIGH (ref 3.8–10.5)

## 2024-01-22 PROCEDURE — 99205 OFFICE O/P NEW HI 60 MIN: CPT

## 2024-01-22 NOTE — HISTORY OF PRESENT ILLNESS
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  Her past medical history is significant for right knee ACL rupture, hyperlipidemia, GERD, mitral valve prolapse. She does not abuse alcohol or tobacco. She is .  Outside pathology (January 4, 2024):  right axillary biopsy CD5+ B lymphocytes compatible with B-CLL CD19 and CD20 CD5 CD23+  Medications: MVI  lovastatin  vitamin D3  Allergies: Zantac  Kaye does not spontaneously report: fever, chills, sweats, weight loss, skin rashes, petechiae, bruising, eye irritation, hearing loss, mouth sores, sore throat, cough, hemoptysis, pleuritic chest pain, dyspnea, change in vision, focal numbness/weakness, chest pains, palpitations, nausea, vomiting, diarrhea, constipation, dysuria, hematuria, bowel or bladder incontinence, sever joint pain, back pain or gait disturbance.   Examination: Emily is articulate and in no acute distress No occiput, poster cervical, anterior cervical, submandibular, sublingual, submental, supraclavicular nor right axillary adenopathy Left axillary three nodes each less than 2cm. Lungs: Clear Cardiac: without rubs Abd: soft and non-tender, Traube's space is tympanitic No inguinal nor femoral adenopathy No sig peripheral edema Gait: unencumbered

## 2024-01-22 NOTE — ASSESSMENT
[FreeTextEntry1] : Assessment: 60-year-old with stage Ia small lymphocytic lymphoma. Past medical history:  Discussion: In the presence of her daughter, Deb, we discussed the biology of cancer white blood cells.  Kaye is aware that she has an indolent or slow-growing cancer the white blood cells with 3 main indications for treatment:  1) transformation: this occurs in fewer than 3 people at 100/year and is often heralded by drenching night sweats high fevers and severe weight loss -- this has not occurred  2) anemia or thrombocytopenia -- we will obtain a CBC today  3) bulky adenopathy causing a poor cosmetic effect or endorgan damage -- these have not occured.  Kaye anemia are aware that early intervention for small lymphocytic lymphoma/chronic lymphocytic leukemia is not associated with a survival advantage and that treatment should be held until one of the 3 preceding indications occurs   We will obtain today CBC quantitative immunoglobulins pneumococcal serologies as these parameters can be low even early-stage disease.  Addendum I:  WBC 11.5, Hgb 8.3, MCV 84.1, RDW: 13.8, ,000  Peripheral smear: The red cell series is normocytic and normochronic with rare hypochromic cells and rare spherocytes.  There is not a significant population of polychromatic red cells nor are nucleated red blood cells present.  No significant rouleaux formation. The white blood series overall is normal in number and in morphology. Platelet count is increased with no abnormal forms.   Impression: Normochromic normocytic anemia with out of reticulocyte response cannot rule out hemolytic process.  Will obtain: Total bilirubin, reticulocyte count, and Chris analysis.

## 2024-01-23 ENCOUNTER — NON-APPOINTMENT (OUTPATIENT)
Age: 69
End: 2024-01-23

## 2024-01-23 LAB
BILIRUB DIRECT SERPL-MCNC: 0.1 MG/DL
BILIRUB SERPL-MCNC: 0.2 MG/DL
LDH SERPL-CCNC: 203 U/L

## 2024-01-25 ENCOUNTER — RESULT REVIEW (OUTPATIENT)
Age: 69
End: 2024-01-25

## 2024-01-25 PROCEDURE — 88321 CONSLTJ&REPRT SLD PREP ELSWR: CPT

## 2024-01-26 ENCOUNTER — LABORATORY RESULT (OUTPATIENT)
Age: 69
End: 2024-01-26

## 2024-01-26 ENCOUNTER — RESULT REVIEW (OUTPATIENT)
Age: 69
End: 2024-01-26

## 2024-01-26 ENCOUNTER — APPOINTMENT (OUTPATIENT)
Dept: HEMATOLOGY ONCOLOGY | Facility: CLINIC | Age: 69
End: 2024-01-26

## 2024-01-26 VITALS
WEIGHT: 159.99 LBS | RESPIRATION RATE: 16 BRPM | HEIGHT: 62 IN | TEMPERATURE: 97.6 F | OXYGEN SATURATION: 97 % | SYSTOLIC BLOOD PRESSURE: 124 MMHG | BODY MASS INDEX: 29.44 KG/M2 | DIASTOLIC BLOOD PRESSURE: 78 MMHG | HEART RATE: 98 BPM

## 2024-01-26 LAB
BASOPHILS # BLD AUTO: 0 K/UL — SIGNIFICANT CHANGE UP (ref 0–0.2)
BASOPHILS NFR BLD AUTO: 0 % — SIGNIFICANT CHANGE UP (ref 0–2)
DEPRECATED S PNEUM 1 IGG SER-MCNC: 1.6 MCG/ML
DEPRECATED S PNEUM12 AB SER-ACNC: 0.4 MCG/ML
DEPRECATED S PNEUM14 AB SER-ACNC: 0.8 MCG/ML
DEPRECATED S PNEUM17 IGG SER IA-MCNC: 0.9 MCG/ML
DEPRECATED S PNEUM18 IGG SER IA-MCNC: 0.3 MCG/ML
DEPRECATED S PNEUM19 IGG SER-MCNC: 1.7 MCG/ML
DEPRECATED S PNEUM19 IGG SER-MCNC: 2.1 MCG/ML
DEPRECATED S PNEUM2 IGG SER-MCNC: 1.2 MCG/ML
DEPRECATED S PNEUM20 IGG SER-MCNC: 2.5 MCG/ML
DEPRECATED S PNEUM22 IGG SER-MCNC: 1 MCG/ML
DEPRECATED S PNEUM23 AB SER-ACNC: 3.3 MCG/ML
DEPRECATED S PNEUM3 AB SER-ACNC: 0.2 MCG/ML
DEPRECATED S PNEUM34 IGG SER-MCNC: 0.6 MCG/ML
DEPRECATED S PNEUM4 AB SER-ACNC: 0.2 MCG/ML
DEPRECATED S PNEUM5 IGG SER-MCNC: 0.3 MCG/ML
DEPRECATED S PNEUM6 IGG SER-MCNC: 1.4 MCG/ML
DEPRECATED S PNEUM7 IGG SER-ACNC: 1 MCG/ML
DEPRECATED S PNEUM8 AB SER-ACNC: 0.9 MCG/ML
DEPRECATED S PNEUM9 AB SER-ACNC: 0.4 MCG/ML
DEPRECATED S PNEUM9 IGG SER-MCNC: 0.4 MCG/ML
ELLIPTOCYTES BLD QL SMEAR: SLIGHT — SIGNIFICANT CHANGE UP
EOSINOPHIL # BLD AUTO: 0.23 K/UL — SIGNIFICANT CHANGE UP (ref 0–0.5)
EOSINOPHIL NFR BLD AUTO: 2 % — SIGNIFICANT CHANGE UP (ref 0–6)
HCT VFR BLD CALC: 27.2 % — LOW (ref 34.5–45)
HGB BLD-MCNC: 8.2 G/DL — LOW (ref 11.5–15.5)
HYPOCHROMIA BLD QL: SLIGHT — SIGNIFICANT CHANGE UP
IMMUNOLOGIST REVIEW: NORMAL
LYMPHOCYTES # BLD AUTO: 4.92 K/UL — HIGH (ref 1–3.3)
LYMPHOCYTES # BLD AUTO: 42 % — SIGNIFICANT CHANGE UP (ref 13–44)
LYMPHOCYTES # SPEC AUTO: 2 % — HIGH (ref 0–0)
MCHC RBC-ENTMCNC: 24.5 PG — LOW (ref 27–34)
MCHC RBC-ENTMCNC: 30.1 G/DL — LOW (ref 32–36)
MCV RBC AUTO: 81.2 FL — SIGNIFICANT CHANGE UP (ref 80–100)
METAMYELOCYTES # FLD: 2 % — HIGH (ref 0–0)
MONOCYTES # BLD AUTO: 0.59 K/UL — SIGNIFICANT CHANGE UP (ref 0–0.9)
MONOCYTES NFR BLD AUTO: 5 % — SIGNIFICANT CHANGE UP (ref 2–14)
NEUTROPHILS # BLD AUTO: 5.51 K/UL — SIGNIFICANT CHANGE UP (ref 1.8–7.4)
NEUTROPHILS NFR BLD AUTO: 47 % — SIGNIFICANT CHANGE UP (ref 43–77)
NRBC # BLD: 0 /100 WBCS — SIGNIFICANT CHANGE UP (ref 0–0)
NRBC # BLD: SIGNIFICANT CHANGE UP /100 WBCS (ref 0–0)
PLAT MORPH BLD: NORMAL — SIGNIFICANT CHANGE UP
PLATELET # BLD AUTO: 488 K/UL — HIGH (ref 150–400)
POIKILOCYTOSIS BLD QL AUTO: SLIGHT — SIGNIFICANT CHANGE UP
POLYCHROMASIA BLD QL SMEAR: SLIGHT — SIGNIFICANT CHANGE UP
RBC # BLD: 3.35 M/UL — LOW (ref 3.8–5.2)
RBC # FLD: 13.9 % — SIGNIFICANT CHANGE UP (ref 10.3–14.5)
RBC BLD AUTO: ABNORMAL
SMUDGE CELLS # BLD: PRESENT — SIGNIFICANT CHANGE UP
STREPTOCOCCUS PNEUMONIAE SEROTYPE 11A: 0.2 MCG/ML
STREPTOCOCCUS PNEUMONIAE SEROTYPE 15B: 1.6 MCG/ML
STREPTOCOCCUS PNEUMONIAE SEROTYPE 33F: 2 MCG/ML
SURGICAL PATHOLOGY STUDY: SIGNIFICANT CHANGE UP
WBC # BLD: 11.72 K/UL — HIGH (ref 3.8–10.5)
WBC # FLD AUTO: 11.72 K/UL — HIGH (ref 3.8–10.5)

## 2024-01-26 NOTE — HISTORY OF PRESENT ILLNESS
[de-identified] : Kaye was last seen: 1/22/24 [de-identified] : Reason for visit: CLL, anemia and bone marrow biopsy  Medications: MVI  lovastatin  vitamin D3  Allergies: Zantac  Kaye does not spontaneously report: fever, chills, sweats, weight loss, skin rashes, petechiae, bruising, eye irritation, hearing loss, mouth sores, sore throat, cough, hemoptysis, pleuritic chest pain, dyspnea, change in vision, focal numbness/weakness, chest pains, palpitations, nausea, vomiting, diarrhea, constipation, dysuria, hematuria, bowel or bladder incontinence, sever joint pain, back pain or gait disturbance.   Examination: Emily is articulate and in no acute distress No occiput, poster cervical, anterior cervical, submandibular, sublingual, submental, supraclavicular nor right axillary adenopathy Left axillary three nodes each less than 2cm. Lungs: Clear Cardiac: without rubs Abd: soft and non-tender, Traube's space is tympanitic No inguinal nor femoral adenopathy No sig peripheral edema Gait: unencumbered   CBC 01/22/24: WBC 11.5, Hgb 8.3, MCV 84.1, RDW: 13.8, ,000, total bili 0.2, , retic 62.5, IgG 873 01/26/24: WBC 11.7, Hgb 8.2, MCV 81.2, RDW: 13.9, ,000

## 2024-01-26 NOTE — REASON FOR VISIT
[Bone Marrow Biopsy] : bone marrow biopsy [Bone Marrow Aspiration] : bone marrow aspiration [Spouse] : spouse [FreeTextEntry2] : 67yo F w/ SLL/CLL w/ anemia

## 2024-01-26 NOTE — ASSESSMENT
[FreeTextEntry1] : Assessment: 68-year-old with stage Ia small lymphocytic lymphoma whose course has been complicated by an uncompensated anemia.   Past medical history: right knee ACL rupture, hyperlipidemia, GERD, mitral valve prolapse.  Plan: bone marrow biopsy including genetics, flow for CLL, CLL FISH and IGVH, iron stains.   Over 25 minutes were spent in direct patient care with greater than 50% discussing her D.D.   Addendum I:   Peripheral smear (1/22/24): The red cell series is normocytic and normochromic with rare hypochromic cells and rare spherocytes.  There is not a significant population of polychromatic red cells nor are nucleated red blood cells present.  No significant rouleaux formation. The white blood series overall is normal in number and in morphology. Platelet count is increased with no abnormal forms.   Impression: Normochromic normocytic anemia with out of reticulocyte response cannot rule out hemolytic process.

## 2024-01-26 NOTE — PROCEDURE
[Bone Marrow Biopsy] : bone marrow biopsy [Bone Marrow Aspiration] : bone marrow aspiration  [Patient] : the patient [Verbal Consent Obtained] : verbal consent was obtained prior to the procedure [Patient identification verified] : patient identification verified [Procedure verified and consent obtained] : procedure verified and consent obtained [Laterality verified and correct site marked] : laterality verified and correct site marked [Left] : site: left [Correct positioning] : correct positioning [Prone] : prone [Superior iliac spine was identified] : the superior iliac spine was identified. [The left posterior iliac crest was prepped with betadine and draped, using sterile technique.] : The left posterior iliac crest was prepped with betadine and draped, using sterile technique. [Lidocaine was injected and into the periosteum overlying the site.] : Lidocaine was injected and into the periosteum overlying the site. [Aspirate] : aspirate [Cytogenetics] : cytogenetics [FISH] : FISH [Other ___] : [unfilled] [Biopsy] : biopsy [Flow Cytometry] : flow cytometry [] : The patient was instructed to remove the bandage the following AM. The patient may bathe. Acetaminophen may be taken for discomfort, as per package directions.If there are any other problems, the patient was instructed to call the office. The patient verbalized understanding, and is aware of the office contact numbers. [FreeTextEntry1] : 67yo F w/ SLL/CLL w/ anemia  [FreeTextEntry2] : 7cc of 1% Lidocaine was used for procedure  Labs WBC 11.72 Hgb 8.2 Hct: 27.2%  PLT: 488k   Bone Marrow Biopsy and Aspiration were performed. Additional atudies sent for IGVH mutational status.

## 2024-02-01 ENCOUNTER — APPOINTMENT (OUTPATIENT)
Dept: HEMATOLOGY ONCOLOGY | Facility: CLINIC | Age: 69
End: 2024-02-01
Payer: MEDICARE

## 2024-02-01 PROCEDURE — 99212 OFFICE O/P EST SF 10 MIN: CPT

## 2024-02-01 PROCEDURE — 99202 OFFICE O/P NEW SF 15 MIN: CPT

## 2024-02-01 NOTE — ASSESSMENT
[FreeTextEntry1] : Assessment: 68-year-old with FISH negative, IGVH (pending) Stevens Stage III CLL.    PMHx: right knee ACL rupture, hyperlipidemia, GERD, mitral valve prolapse.  Data: Marrow (1/26/24): FISH normal,   Plan: bone marrow biopsy: IGVH, iron stains pending. Discussed time limited therpay.   Over 15 minutes were spent discussing her Dx.  Follow-up  next week.    Addendum I:   Peripheral smear (1/22/24): The red cell series is normocytic and normochromic with rare hypochromic cells and rare spherocytes.  There is not a significant population of polychromatic red cells nor are nucleated red blood cells present.  No significant rouleaux formation. The white blood series overall is normal in number and in morphology. Platelet count is increased with no abnormal forms.   Impression: Normochromic normocytic anemia with out of reticulocyte response cannot rule out hemolytic process.

## 2024-02-01 NOTE — HISTORY OF PRESENT ILLNESS
[de-identified] : Kaye was last seen: 1/26/24 [de-identified] : Reason for visit: CLL, discuss bone marrow biopsy  Medications: MVI  lovastatin  vitamin D3  Allergies: Zantac  Kaye does not spontaneously report: fever, chills, sweats, weight loss, skin rashes, petechiae, bruising, eye irritation, hearing loss, mouth sores, sore throat, cough, hemoptysis, pleuritic chest pain, dyspnea, change in vision, focal numbness/weakness, chest pains, palpitations, nausea, vomiting, diarrhea, constipation, dysuria, hematuria, bowel or bladder incontinence, sever joint pain, back pain or gait disturbance.   Examination: Emily is articulate and in no acute distress No occiput, poster cervical, anterior cervical, submandibular, sublingual, submental, supraclavicular nor right axillary adenopathy Left axillary three nodes each less than 2cm. Lungs: Clear Cardiac: without rubs Abd: soft and non-tender, Traube's space is tympanitic No inguinal nor femoral adenopathy No sig peripheral edema Gait: unencumbered   CBC 01/22/24: WBC 11.5, Hgb 8.3, MCV 84.1, RDW: 13.8, ,000, total bili 0.2, , retic 62.5, IgG 873 01/26/24: WBC 11.7, Hgb 8.2, MCV 81.2, RDW: 13.9, ,000

## 2024-02-07 ENCOUNTER — APPOINTMENT (OUTPATIENT)
Dept: HEMATOLOGY ONCOLOGY | Facility: CLINIC | Age: 69
End: 2024-02-07
Payer: MEDICARE

## 2024-02-07 ENCOUNTER — RESULT REVIEW (OUTPATIENT)
Age: 69
End: 2024-02-07

## 2024-02-07 VITALS
WEIGHT: 159 LBS | HEIGHT: 62 IN | OXYGEN SATURATION: 97 % | TEMPERATURE: 98.3 F | SYSTOLIC BLOOD PRESSURE: 125 MMHG | DIASTOLIC BLOOD PRESSURE: 76 MMHG | HEART RATE: 87 BPM | RESPIRATION RATE: 18 BRPM | BODY MASS INDEX: 29.26 KG/M2

## 2024-02-07 DIAGNOSIS — D50.1 SIDEROPENIC DYSPHAGIA: ICD-10-CM

## 2024-02-07 LAB
ALBUMIN SERPL ELPH-MCNC: 4.1 G/DL
ALP BLD-CCNC: 88 U/L
ALT SERPL-CCNC: 24 U/L
ANION GAP SERPL CALC-SCNC: 11 MMOL/L
ANISOCYTOSIS BLD QL: SLIGHT — SIGNIFICANT CHANGE UP
AST SERPL-CCNC: 19 U/L
BASOPHILS # BLD AUTO: 0 K/UL — SIGNIFICANT CHANGE UP (ref 0–0.2)
BASOPHILS NFR BLD AUTO: 0 % — SIGNIFICANT CHANGE UP (ref 0–2)
BILIRUB SERPL-MCNC: 0.2 MG/DL
BUN SERPL-MCNC: 21 MG/DL
CALCIUM SERPL-MCNC: 9.2 MG/DL
CHLORIDE SERPL-SCNC: 106 MMOL/L
CO2 SERPL-SCNC: 25 MMOL/L
CREAT SERPL-MCNC: 0.88 MG/DL
DACRYOCYTES BLD QL SMEAR: SLIGHT — SIGNIFICANT CHANGE UP
EGFR: 72 ML/MIN/1.73M2
EOSINOPHIL # BLD AUTO: 0 K/UL — SIGNIFICANT CHANGE UP (ref 0–0.5)
EOSINOPHIL NFR BLD AUTO: 0 % — SIGNIFICANT CHANGE UP (ref 0–6)
GLUCOSE SERPL-MCNC: 99 MG/DL
HCT VFR BLD CALC: 25.8 % — LOW (ref 34.5–45)
HGB BLD-MCNC: 7.5 G/DL — LOW (ref 11.5–15.5)
HYPOCHROMIA BLD QL: SIGNIFICANT CHANGE UP
LYMPHOCYTES # BLD AUTO: 42 % — SIGNIFICANT CHANGE UP (ref 13–44)
LYMPHOCYTES # BLD AUTO: 5.09 K/UL — HIGH (ref 1–3.3)
LYMPHOCYTES # SPEC AUTO: 1 % — HIGH (ref 0–0)
MCHC RBC-ENTMCNC: 23.9 PG — LOW (ref 27–34)
MCHC RBC-ENTMCNC: 29.1 G/DL — LOW (ref 32–36)
MCV RBC AUTO: 82.2 FL — SIGNIFICANT CHANGE UP (ref 80–100)
MONOCYTES # BLD AUTO: 1.21 K/UL — HIGH (ref 0–0.9)
MONOCYTES NFR BLD AUTO: 10 % — SIGNIFICANT CHANGE UP (ref 2–14)
NEUTROPHILS # BLD AUTO: 5.69 K/UL — SIGNIFICANT CHANGE UP (ref 1.8–7.4)
NEUTROPHILS NFR BLD AUTO: 47 % — SIGNIFICANT CHANGE UP (ref 43–77)
NRBC # BLD: 0 /100 WBCS — SIGNIFICANT CHANGE UP (ref 0–0)
NRBC # BLD: SIGNIFICANT CHANGE UP /100 WBCS (ref 0–0)
PLAT MORPH BLD: NORMAL — SIGNIFICANT CHANGE UP
PLATELET # BLD AUTO: 430 K/UL — HIGH (ref 150–400)
POIKILOCYTOSIS BLD QL AUTO: SLIGHT — SIGNIFICANT CHANGE UP
POLYCHROMASIA BLD QL SMEAR: SLIGHT — SIGNIFICANT CHANGE UP
POTASSIUM SERPL-SCNC: 5 MMOL/L
PROT SERPL-MCNC: 6.7 G/DL
RBC # BLD: 3.14 M/UL — LOW (ref 3.8–5.2)
RBC # FLD: 14.6 % — HIGH (ref 10.3–14.5)
RBC BLD AUTO: ABNORMAL
SODIUM SERPL-SCNC: 142 MMOL/L
WBC # BLD: 12.11 K/UL — HIGH (ref 3.8–10.5)
WBC # FLD AUTO: 12.11 K/UL — HIGH (ref 3.8–10.5)

## 2024-02-07 PROCEDURE — 99215 OFFICE O/P EST HI 40 MIN: CPT

## 2024-02-07 NOTE — ASSESSMENT
[FreeTextEntry1] : Assessment: 68-year-old, with +12, FISH negative, IGVH (3.95%, segment IGHV 4-59) Stevens Stage III CLL: untreated    PMHx: right knee ACL rupture, hyperlipidemia, GERD, mitral valve prolapse.  Data: Marrow (1/26/24): absent iron stores  Plan: Signed informed consent for IV iron.   start folate 1mg PO qd Discussed BTKi and venetoclax - as indication for CLL treatment is anemia - will first assess effect of IV iron. Discussed loop recorder if she is to receive BTKi Will contact Bertrand Conner () -- her last coloscopy was > 5 years ago.  Over 50 minutes were spent in direct patient care discussing the above and in the consent process.  Addendum I:   Peripheral smear (1/22/24): The red cell series is normocytic and normochromic with rare hypochromic cells and rare spherocytes.  There is not a significant population of polychromatic red cells nor are nucleated red blood cells present.  No significant rouleaux formation. The white blood series overall is normal in number and in morphology. Platelet count is increased with no abnormal forms.   Impression: Normochromic normocytic anemia with out of reticulocyte response cannot rule out hemolytic process.  Smear (2/7/24): majoritic of red cells are hypochromic, Few (more that rare) polychromatic macrocytic red cells.  Mild to moderate roulous formation.

## 2024-02-07 NOTE — HISTORY OF PRESENT ILLNESS
[de-identified] : Kaye was last seen: 2/1/24 [de-identified] : Reason for visit: CLL  Medications: MVI  lovastatin  vitamin D3  Allergies: Zantac  Kaye does not spontaneously report: fever, chills, sweats, weight loss, skin rashes, petechiae, bruising, eye irritation, hearing loss, mouth sores, sore throat, cough, hemoptysis, pleuritic chest pain, dyspnea, change in vision, focal numbness/weakness, chest pains, palpitations, nausea, vomiting, diarrhea, constipation, dysuria, hematuria, bowel or bladder incontinence, sever joint pain, back pain or gait disturbance.   Examination: Emily is articulate and in no acute distress No occiput, poster cervical, anterior cervical, submandibular, sublingual, submental, supraclavicular nor right axillary adenopathy Left axillary three nodes each less than 2cm. Lungs: Clear Cardiac: without rubs Abd: soft and non-tender, Traube's space is tympanitic No inguinal nor femoral adenopathy No sig peripheral edema Gait: unencumbered   CBC 01/22/24: WBC 11.5, Hgb 8.3, MCV 84.1, RDW: 13.8, ,000, total bili 0.2, , retic 62.5, IgG 873 01/26/24: WBC 11.7, Hgb 8.2, MCV 81.2, RDW: 13.9, ,000 02/07/24: WBC 12.1, Hgb 7.5, MCV 82.2, RDW: 14.6, ,000

## 2024-02-12 ENCOUNTER — NON-APPOINTMENT (OUTPATIENT)
Age: 69
End: 2024-02-12

## 2024-02-12 ENCOUNTER — RESULT REVIEW (OUTPATIENT)
Age: 69
End: 2024-02-12

## 2024-02-12 ENCOUNTER — APPOINTMENT (OUTPATIENT)
Dept: INFUSION THERAPY | Facility: HOSPITAL | Age: 69
End: 2024-02-12

## 2024-02-12 LAB
ANISOCYTOSIS BLD QL: SLIGHT — SIGNIFICANT CHANGE UP
BASOPHILS # BLD AUTO: 0 K/UL — SIGNIFICANT CHANGE UP (ref 0–0.2)
BASOPHILS NFR BLD AUTO: 0 % — SIGNIFICANT CHANGE UP (ref 0–2)
DACRYOCYTES BLD QL SMEAR: SLIGHT — SIGNIFICANT CHANGE UP
EOSINOPHIL # BLD AUTO: 0.14 K/UL — SIGNIFICANT CHANGE UP (ref 0–0.5)
EOSINOPHIL NFR BLD AUTO: 1 % — SIGNIFICANT CHANGE UP (ref 0–6)
HCT VFR BLD CALC: 25.1 % — LOW (ref 34.5–45)
HGB BLD-MCNC: 7.4 G/DL — LOW (ref 11.5–15.5)
HYPOCHROMIA BLD QL: SLIGHT — SIGNIFICANT CHANGE UP
LYMPHOCYTES # BLD AUTO: 38 % — SIGNIFICANT CHANGE UP (ref 13–44)
LYMPHOCYTES # BLD AUTO: 5.49 K/UL — HIGH (ref 1–3.3)
LYMPHOCYTES # SPEC AUTO: 1 % — HIGH (ref 0–0)
MCHC RBC-ENTMCNC: 23.9 PG — LOW (ref 27–34)
MCHC RBC-ENTMCNC: 29.5 G/DL — LOW (ref 32–36)
MCV RBC AUTO: 81.2 FL — SIGNIFICANT CHANGE UP (ref 80–100)
MONOCYTES # BLD AUTO: 0.58 K/UL — SIGNIFICANT CHANGE UP (ref 0–0.9)
MONOCYTES NFR BLD AUTO: 4 % — SIGNIFICANT CHANGE UP (ref 2–14)
NEUTROPHILS # BLD AUTO: 8.1 K/UL — HIGH (ref 1.8–7.4)
NEUTROPHILS NFR BLD AUTO: 56 % — SIGNIFICANT CHANGE UP (ref 43–77)
NRBC # BLD: 0 /100 WBCS — SIGNIFICANT CHANGE UP (ref 0–0)
NRBC # BLD: SIGNIFICANT CHANGE UP /100 WBCS (ref 0–0)
PLAT MORPH BLD: NORMAL — SIGNIFICANT CHANGE UP
PLATELET # BLD AUTO: 469 K/UL — HIGH (ref 150–400)
POIKILOCYTOSIS BLD QL AUTO: SLIGHT — SIGNIFICANT CHANGE UP
RBC # BLD: 3.09 M/UL — LOW (ref 3.8–5.2)
RBC # FLD: 14.6 % — HIGH (ref 10.3–14.5)
RBC BLD AUTO: ABNORMAL
WBC # BLD: 14.46 K/UL — HIGH (ref 3.8–10.5)
WBC # FLD AUTO: 14.46 K/UL — HIGH (ref 3.8–10.5)

## 2024-02-13 ENCOUNTER — NON-APPOINTMENT (OUTPATIENT)
Age: 69
End: 2024-02-13

## 2024-02-13 DIAGNOSIS — D50.9 IRON DEFICIENCY ANEMIA, UNSPECIFIED: ICD-10-CM

## 2024-02-15 DIAGNOSIS — D50.9 IRON DEFICIENCY ANEMIA, UNSPECIFIED: ICD-10-CM

## 2024-02-16 ENCOUNTER — APPOINTMENT (OUTPATIENT)
Dept: ORTHOPEDIC SURGERY | Facility: CLINIC | Age: 69
End: 2024-02-16

## 2024-02-16 ENCOUNTER — RESULT REVIEW (OUTPATIENT)
Age: 69
End: 2024-02-16

## 2024-02-16 ENCOUNTER — APPOINTMENT (OUTPATIENT)
Dept: HEMATOLOGY ONCOLOGY | Facility: CLINIC | Age: 69
End: 2024-02-16

## 2024-02-16 LAB
ANISOCYTOSIS BLD QL: SLIGHT — SIGNIFICANT CHANGE UP
BASOPHILS # BLD AUTO: 0.13 K/UL — SIGNIFICANT CHANGE UP (ref 0–0.2)
BASOPHILS NFR BLD AUTO: 1 % — SIGNIFICANT CHANGE UP (ref 0–2)
DACRYOCYTES BLD QL SMEAR: SLIGHT — SIGNIFICANT CHANGE UP
ELLIPTOCYTES BLD QL SMEAR: SLIGHT — SIGNIFICANT CHANGE UP
EOSINOPHIL # BLD AUTO: 0.26 K/UL — SIGNIFICANT CHANGE UP (ref 0–0.5)
EOSINOPHIL NFR BLD AUTO: 2 % — SIGNIFICANT CHANGE UP (ref 0–6)
HCT VFR BLD CALC: 30.1 % — LOW (ref 34.5–45)
HGB BLD-MCNC: 8.6 G/DL — LOW (ref 11.5–15.5)
HYPOCHROMIA BLD QL: SLIGHT — SIGNIFICANT CHANGE UP
LYMPHOCYTES # BLD AUTO: 34 % — SIGNIFICANT CHANGE UP (ref 13–44)
LYMPHOCYTES # BLD AUTO: 4.48 K/UL — HIGH (ref 1–3.3)
MCHC RBC-ENTMCNC: 24.4 PG — LOW (ref 27–34)
MCHC RBC-ENTMCNC: 28.6 G/DL — LOW (ref 32–36)
MCV RBC AUTO: 85.5 FL — SIGNIFICANT CHANGE UP (ref 80–100)
MONOCYTES # BLD AUTO: 0.79 K/UL — SIGNIFICANT CHANGE UP (ref 0–0.9)
MONOCYTES NFR BLD AUTO: 6 % — SIGNIFICANT CHANGE UP (ref 2–14)
NEUTROPHILS # BLD AUTO: 7.38 K/UL — SIGNIFICANT CHANGE UP (ref 1.8–7.4)
NEUTROPHILS NFR BLD AUTO: 56 % — SIGNIFICANT CHANGE UP (ref 43–77)
NRBC # BLD: 0 /100 WBCS — SIGNIFICANT CHANGE UP (ref 0–0)
NRBC # BLD: SIGNIFICANT CHANGE UP /100 WBCS (ref 0–0)
PLAT MORPH BLD: NORMAL — SIGNIFICANT CHANGE UP
PLATELET # BLD AUTO: 481 K/UL — HIGH (ref 150–400)
POIKILOCYTOSIS BLD QL AUTO: SLIGHT — SIGNIFICANT CHANGE UP
POLYCHROMASIA BLD QL SMEAR: SLIGHT — SIGNIFICANT CHANGE UP
RBC # BLD: 3.52 M/UL — LOW (ref 3.8–5.2)
RBC # FLD: 17.1 % — HIGH (ref 10.3–14.5)
RBC BLD AUTO: ABNORMAL
VARIANT LYMPHS # BLD: 1 % — SIGNIFICANT CHANGE UP (ref 0–6)
WBC # BLD: 13.18 K/UL — HIGH (ref 3.8–10.5)
WBC # FLD AUTO: 13.18 K/UL — HIGH (ref 3.8–10.5)

## 2024-02-17 ENCOUNTER — RESULT REVIEW (OUTPATIENT)
Age: 69
End: 2024-02-17

## 2024-02-17 ENCOUNTER — APPOINTMENT (OUTPATIENT)
Dept: INFUSION THERAPY | Facility: HOSPITAL | Age: 69
End: 2024-02-17

## 2024-02-17 LAB
ANISOCYTOSIS BLD QL: SLIGHT — SIGNIFICANT CHANGE UP
BASOPHILS # BLD AUTO: 0 K/UL — SIGNIFICANT CHANGE UP (ref 0–0.2)
BASOPHILS NFR BLD AUTO: 0 % — SIGNIFICANT CHANGE UP (ref 0–2)
DACRYOCYTES BLD QL SMEAR: SLIGHT — SIGNIFICANT CHANGE UP
ELLIPTOCYTES BLD QL SMEAR: SLIGHT — SIGNIFICANT CHANGE UP
EOSINOPHIL # BLD AUTO: 0.27 K/UL — SIGNIFICANT CHANGE UP (ref 0–0.5)
EOSINOPHIL NFR BLD AUTO: 2 % — SIGNIFICANT CHANGE UP (ref 0–6)
HCT VFR BLD CALC: 28.3 % — LOW (ref 34.5–45)
HGB BLD-MCNC: 8.2 G/DL — LOW (ref 11.5–15.5)
HYPOCHROMIA BLD QL: SLIGHT — SIGNIFICANT CHANGE UP
LYMPHOCYTES # BLD AUTO: 41 % — SIGNIFICANT CHANGE UP (ref 13–44)
LYMPHOCYTES # BLD AUTO: 5.55 K/UL — HIGH (ref 1–3.3)
MCHC RBC-ENTMCNC: 24.8 PG — LOW (ref 27–34)
MCHC RBC-ENTMCNC: 29 G/DL — LOW (ref 32–36)
MCV RBC AUTO: 85.8 FL — SIGNIFICANT CHANGE UP (ref 80–100)
METAMYELOCYTES # FLD: 1 % — HIGH (ref 0–0)
MONOCYTES # BLD AUTO: 0.68 K/UL — SIGNIFICANT CHANGE UP (ref 0–0.9)
MONOCYTES NFR BLD AUTO: 5 % — SIGNIFICANT CHANGE UP (ref 2–14)
NEUTROPHILS # BLD AUTO: 6.91 K/UL — SIGNIFICANT CHANGE UP (ref 1.8–7.4)
NEUTROPHILS NFR BLD AUTO: 51 % — SIGNIFICANT CHANGE UP (ref 43–77)
NRBC # BLD: 0 /100 WBCS — SIGNIFICANT CHANGE UP (ref 0–0)
NRBC # BLD: SIGNIFICANT CHANGE UP /100 WBCS (ref 0–0)
PLAT MORPH BLD: NORMAL — SIGNIFICANT CHANGE UP
PLATELET # BLD AUTO: 440 K/UL — HIGH (ref 150–400)
POIKILOCYTOSIS BLD QL AUTO: SLIGHT — SIGNIFICANT CHANGE UP
POLYCHROMASIA BLD QL SMEAR: SLIGHT — SIGNIFICANT CHANGE UP
RBC # BLD: 3.3 M/UL — LOW (ref 3.8–5.2)
RBC # FLD: 19 % — HIGH (ref 10.3–14.5)
RBC BLD AUTO: ABNORMAL
SCHISTOCYTES BLD QL AUTO: SLIGHT — SIGNIFICANT CHANGE UP
WBC # BLD: 13.54 K/UL — HIGH (ref 3.8–10.5)
WBC # FLD AUTO: 13.54 K/UL — HIGH (ref 3.8–10.5)

## 2024-02-19 ENCOUNTER — RESULT REVIEW (OUTPATIENT)
Age: 69
End: 2024-02-19

## 2024-02-21 ENCOUNTER — APPOINTMENT (OUTPATIENT)
Dept: INFUSION THERAPY | Facility: HOSPITAL | Age: 69
End: 2024-02-21

## 2024-02-21 DIAGNOSIS — Z51.89 ENCOUNTER FOR OTHER SPECIFIED AFTERCARE: ICD-10-CM

## 2024-02-26 ENCOUNTER — INPATIENT (INPATIENT)
Facility: HOSPITAL | Age: 69
LOS: 2 days | Discharge: HOME | DRG: 388 | End: 2024-02-29
Attending: STUDENT IN AN ORGANIZED HEALTH CARE EDUCATION/TRAINING PROGRAM | Admitting: INTERNAL MEDICINE
Payer: MEDICARE

## 2024-02-26 VITALS
HEART RATE: 112 BPM | HEIGHT: 62 IN | DIASTOLIC BLOOD PRESSURE: 83 MMHG | OXYGEN SATURATION: 95 % | WEIGHT: 160.06 LBS | SYSTOLIC BLOOD PRESSURE: 143 MMHG | TEMPERATURE: 99 F | RESPIRATION RATE: 18 BRPM

## 2024-02-26 LAB
ALBUMIN SERPL ELPH-MCNC: 3.7 G/DL — SIGNIFICANT CHANGE UP (ref 3.3–5)
ALP SERPL-CCNC: 98 U/L — SIGNIFICANT CHANGE UP (ref 40–120)
ALT FLD-CCNC: 48 U/L — HIGH (ref 10–45)
ANION GAP SERPL CALC-SCNC: 13 MMOL/L — SIGNIFICANT CHANGE UP (ref 5–17)
APPEARANCE UR: CLEAR — SIGNIFICANT CHANGE UP
AST SERPL-CCNC: 33 U/L — SIGNIFICANT CHANGE UP (ref 10–40)
BASOPHILS # BLD AUTO: 0.05 K/UL — SIGNIFICANT CHANGE UP (ref 0–0.2)
BASOPHILS NFR BLD AUTO: 0.3 % — SIGNIFICANT CHANGE UP (ref 0–2)
BILIRUB SERPL-MCNC: 0.6 MG/DL — SIGNIFICANT CHANGE UP (ref 0.2–1.2)
BILIRUB UR-MCNC: NEGATIVE — SIGNIFICANT CHANGE UP
BUN SERPL-MCNC: 23 MG/DL — SIGNIFICANT CHANGE UP (ref 7–23)
CALCIUM SERPL-MCNC: 9.5 MG/DL — SIGNIFICANT CHANGE UP (ref 8.4–10.5)
CHLORIDE SERPL-SCNC: 103 MMOL/L — SIGNIFICANT CHANGE UP (ref 96–108)
CO2 SERPL-SCNC: 25 MMOL/L — SIGNIFICANT CHANGE UP (ref 22–31)
COLOR SPEC: YELLOW — SIGNIFICANT CHANGE UP
CREAT SERPL-MCNC: 0.95 MG/DL — SIGNIFICANT CHANGE UP (ref 0.5–1.3)
DIFF PNL FLD: NEGATIVE — SIGNIFICANT CHANGE UP
EGFR: 65 ML/MIN/1.73M2 — SIGNIFICANT CHANGE UP
EOSINOPHIL # BLD AUTO: 0 K/UL — SIGNIFICANT CHANGE UP (ref 0–0.5)
EOSINOPHIL NFR BLD AUTO: 0 % — SIGNIFICANT CHANGE UP (ref 0–6)
GLUCOSE SERPL-MCNC: 181 MG/DL — HIGH (ref 70–99)
GLUCOSE UR QL: NEGATIVE MG/DL — SIGNIFICANT CHANGE UP
HCT VFR BLD CALC: 38.4 % — SIGNIFICANT CHANGE UP (ref 34.5–45)
HGB BLD-MCNC: 11.9 G/DL — SIGNIFICANT CHANGE UP (ref 11.5–15.5)
IMM GRANULOCYTES NFR BLD AUTO: 0.5 % — SIGNIFICANT CHANGE UP (ref 0–0.9)
KETONES UR-MCNC: 15 MG/DL
LEUKOCYTE ESTERASE UR-ACNC: NEGATIVE — SIGNIFICANT CHANGE UP
LIDOCAIN IGE QN: 60 U/L — SIGNIFICANT CHANGE UP (ref 16–77)
LYMPHOCYTES # BLD AUTO: 1.32 K/UL — SIGNIFICANT CHANGE UP (ref 1–3.3)
LYMPHOCYTES # BLD AUTO: 6.8 % — LOW (ref 13–44)
MCHC RBC-ENTMCNC: 26.6 PG — LOW (ref 27–34)
MCHC RBC-ENTMCNC: 31 GM/DL — LOW (ref 32–36)
MCV RBC AUTO: 85.7 FL — SIGNIFICANT CHANGE UP (ref 80–100)
MONOCYTES # BLD AUTO: 1.16 K/UL — HIGH (ref 0–0.9)
MONOCYTES NFR BLD AUTO: 6 % — SIGNIFICANT CHANGE UP (ref 2–14)
NEUTROPHILS # BLD AUTO: 16.8 K/UL — HIGH (ref 1.8–7.4)
NEUTROPHILS NFR BLD AUTO: 86.4 % — HIGH (ref 43–77)
NITRITE UR-MCNC: NEGATIVE — SIGNIFICANT CHANGE UP
NRBC # BLD: 0 /100 WBCS — SIGNIFICANT CHANGE UP (ref 0–0)
PH UR: 5 — SIGNIFICANT CHANGE UP (ref 5–8)
PLATELET # BLD AUTO: 393 K/UL — SIGNIFICANT CHANGE UP (ref 150–400)
POTASSIUM SERPL-MCNC: 4.6 MMOL/L — SIGNIFICANT CHANGE UP (ref 3.5–5.3)
POTASSIUM SERPL-SCNC: 4.6 MMOL/L — SIGNIFICANT CHANGE UP (ref 3.5–5.3)
PROT SERPL-MCNC: 7.8 G/DL — SIGNIFICANT CHANGE UP (ref 6–8.3)
PROT UR-MCNC: NEGATIVE MG/DL — SIGNIFICANT CHANGE UP
RBC # BLD: 4.48 M/UL — SIGNIFICANT CHANGE UP (ref 3.8–5.2)
RBC # FLD: 23.2 % — HIGH (ref 10.3–14.5)
SODIUM SERPL-SCNC: 141 MMOL/L — SIGNIFICANT CHANGE UP (ref 135–145)
SP GR SPEC: 1.05 — HIGH (ref 1–1.03)
UROBILINOGEN FLD QL: 0.2 MG/DL — SIGNIFICANT CHANGE UP (ref 0.2–1)
WBC # BLD: 19.43 K/UL — HIGH (ref 3.8–10.5)
WBC # FLD AUTO: 19.43 K/UL — HIGH (ref 3.8–10.5)

## 2024-02-26 PROCEDURE — 71260 CT THORAX DX C+: CPT | Mod: 26,MC

## 2024-02-26 PROCEDURE — 99285 EMERGENCY DEPT VISIT HI MDM: CPT

## 2024-02-26 PROCEDURE — 74177 CT ABD & PELVIS W/CONTRAST: CPT | Mod: 26,MC

## 2024-02-26 RX ORDER — SODIUM CHLORIDE 9 MG/ML
1000 INJECTION INTRAMUSCULAR; INTRAVENOUS; SUBCUTANEOUS ONCE
Refills: 0 | Status: COMPLETED | OUTPATIENT
Start: 2024-02-26 | End: 2024-02-26

## 2024-02-26 RX ORDER — ACETAMINOPHEN 500 MG
1000 TABLET ORAL ONCE
Refills: 0 | Status: COMPLETED | OUTPATIENT
Start: 2024-02-26 | End: 2024-02-26

## 2024-02-26 RX ORDER — ONDANSETRON 8 MG/1
4 TABLET, FILM COATED ORAL ONCE
Refills: 0 | Status: COMPLETED | OUTPATIENT
Start: 2024-02-26 | End: 2024-02-26

## 2024-02-26 RX ORDER — MORPHINE SULFATE 50 MG/1
2 CAPSULE, EXTENDED RELEASE ORAL ONCE
Refills: 0 | Status: DISCONTINUED | OUTPATIENT
Start: 2024-02-26 | End: 2024-02-26

## 2024-02-26 RX ADMIN — Medication 400 MILLIGRAM(S): at 22:55

## 2024-02-26 RX ADMIN — SODIUM CHLORIDE 1000 MILLILITER(S): 9 INJECTION INTRAMUSCULAR; INTRAVENOUS; SUBCUTANEOUS at 21:30

## 2024-02-26 RX ADMIN — Medication 1000 MILLIGRAM(S): at 23:10

## 2024-02-26 RX ADMIN — Medication 1000 MILLIGRAM(S): at 23:25

## 2024-02-26 RX ADMIN — ONDANSETRON 4 MILLIGRAM(S): 8 TABLET, FILM COATED ORAL at 21:30

## 2024-02-26 NOTE — PHYSICAL EXAM
[Well Developed] : well developed [Well Nourished] : well nourished [No Acute Distress] : no acute distress [Normal Conjunctiva] : normal conjunctiva [Normal Venous Pressure] : normal venous pressure [No Carotid Bruit] : no carotid bruit [No Rub] : no rub [No Murmur] : no murmur [Normal S1, S2] : normal S1, S2 [Clear Lung Fields] : clear lung fields [No Gallop] : no gallop [No Respiratory Distress] : no respiratory distress  [Good Air Entry] : good air entry [Non Tender] : non-tender [Soft] : abdomen soft [Normal Bowel Sounds] : normal bowel sounds [No Masses/organomegaly] : no masses/organomegaly [No Edema] : no edema [Normal Gait] : normal gait [No Cyanosis] : no cyanosis [No Clubbing] : no clubbing [No Skin Lesions] : no skin lesions [No Rash] : no rash [No Varicosities] : no varicosities [No Focal Deficits] : no focal deficits [Moves all extremities] : moves all extremities [Alert and Oriented] : alert and oriented [Normal Speech] : normal speech [Normal memory] : normal memory

## 2024-02-26 NOTE — ED PROVIDER NOTE - OBJECTIVE STATEMENT
68-year-old female with past medical history of CLL presenting emergency department today with approximately 7 hours of severe epigastric abdominal pain that does not radiate.  It is associated with multiple episodes of nonbilious nonbloody emesis.  Is not able to tolerate anything p.o. at this time.  No history of similar pains in the past.  There is no diarrhea.  No fever as well.  Patient did have a bowel movement earlier today.  No history of similar episodes.  Patient did have eggs salad this afternoon prior to the starting of symptoms

## 2024-02-26 NOTE — ED ADULT TRIAGE NOTE - CHIEF COMPLAINT QUOTE
I have abdominal pain today and vomited 5 times, no diarrhea. I have CLL , got transfused 3 days ago"

## 2024-02-26 NOTE — ED ADULT NURSE NOTE - NSFALLUNIVINTERV_ED_ALL_ED
Bed/Stretcher in lowest position, wheels locked, appropriate side rails in place/Call bell, personal items and telephone in reach/Instruct patient to call for assistance before getting out of bed/chair/stretcher/Non-slip footwear applied when patient is off stretcher/[place] to call system/Physically safe environment - no spills, clutter or unnecessary equipment/Purposeful proactive rounding/Room/bathroom lighting operational, light cord in reach

## 2024-02-26 NOTE — ED PROVIDER NOTE - CLINICAL SUMMARY MEDICAL DECISION MAKING FREE TEXT BOX
68-year-old female past medical history of CLL presenting to the emergency department with 7 hours of epigastric pain and nausea vomiting.  Differential is broad and includes pancreatitis, gastroenteritis, food poisoning, biliary colic.  Will get a series of laboratory studies IV fluids antiemetics and pain control.  I will get a scan of this patient as well being that she is being diagnosed with CLL and has not had previous imaging.  It is possible this is an SBO although it is unlikely based on the overall presentation.  Once results of imaging labs are back we will see if the patient can tolerate p.o.  At that point in time we will come up with a disposition patient will be signed out to the night physician pending workup. 68-year-old female past medical history of CLL presenting to the emergency department with 7 hours of epigastric pain and nausea vomiting.  Differential is broad and includes pancreatitis, gastroenteritis, food poisoning, biliary colic.  Will get a series of laboratory studies IV fluids antiemetics and pain control.  I will get a scan of this patient as well being that she is being diagnosed with CLL and has not had previous imaging.  It is possible this is an SBO although it is unlikely based on the overall presentation.  Once results of imaging labs are back we will see if the patient can tolerate p.o.  At that point in time we will come up with a disposition patient will be signed out to the night physician pending workup.    update: ct shows SBO. Patient clinically improved.  No vomiting for the last 3 to 4 hours.  Pain better but not resolved.  Still with slight epigastric tenderness.  WBC 19 K. h/o CLL. Recent WBC from February 12 to 14.5. .  No fever.  CT chest also showing consolidative airspace disease right middle lobe and lingula.  No hypoxia or shortness of breath or chest pain.  Patient states she has chronic cough for about 2 months.  No clinical evidence of acute pneumonia.  Will defer antibiotics for now. .case d/w attending surgeon dr Cuello. bowel rest, iv fluids. does not recommend NGT at this time as no vomiting and nondistended stomach. Discussed with hospitalist attending Dr. Mckeon, accepting admission

## 2024-02-26 NOTE — ED PROVIDER NOTE - PHYSICAL EXAMINATION
Vitals: I have reviewed the patients vital signs  General: nontoxic appearing  HEENT: Atraumatic, normocephalic, airway patent  Eyes: EOMI, tracking appropriately  Neck: no tracheal deviation  Chest/Lungs: no trauma, symmetric chest rise, speaking in complete sentences,  no resp distress  Heart: skin and extremities well perfused, regular rate and rhythm  Neuro: A+Ox3, appears non focal  MSK: no deformities  Skin: no cyanosis, no jaundice   Psych:  Normal mood and affect  : Abdomen: Soft tender palpation epigastric left lower quadrant no rebound no guarding

## 2024-02-26 NOTE — ED PROVIDER NOTE - NSICDXPASTMEDICALHX_GEN_ALL_CORE_FT
PAST MEDICAL HISTORY:  CLL (chronic lymphocytic leukemia)     GERD (gastroesophageal reflux disease)     Hyperlipidemia

## 2024-02-26 NOTE — ED PROVIDER NOTE - CONSULTANT FREE TEXT FOR MDM DISCUSSED CASE WITH QUESTION
case d/w attending surgeon dr Cuello. bowel rest, iv fluids. does not recommend NGT at this time as no vomiting and nondistended stomach.

## 2024-02-27 ENCOUNTER — APPOINTMENT (OUTPATIENT)
Dept: CARDIOLOGY | Facility: CLINIC | Age: 69
End: 2024-02-27

## 2024-02-27 DIAGNOSIS — K56.609 UNSPECIFIED INTESTINAL OBSTRUCTION, UNSPECIFIED AS TO PARTIAL VERSUS COMPLETE OBSTRUCTION: ICD-10-CM

## 2024-02-27 DIAGNOSIS — Z98.890 OTHER SPECIFIED POSTPROCEDURAL STATES: Chronic | ICD-10-CM

## 2024-02-27 LAB
A1C WITH ESTIMATED AVERAGE GLUCOSE RESULT: 5 % — SIGNIFICANT CHANGE UP (ref 4–5.6)
ALBUMIN SERPL ELPH-MCNC: 3.3 G/DL — SIGNIFICANT CHANGE UP (ref 3.3–5)
ALP SERPL-CCNC: 87 U/L — SIGNIFICANT CHANGE UP (ref 40–120)
ALT FLD-CCNC: 42 U/L — SIGNIFICANT CHANGE UP (ref 10–45)
ANION GAP SERPL CALC-SCNC: 12 MMOL/L — SIGNIFICANT CHANGE UP (ref 5–17)
AST SERPL-CCNC: 27 U/L — SIGNIFICANT CHANGE UP (ref 10–40)
BASOPHILS # BLD AUTO: 0.05 K/UL — SIGNIFICANT CHANGE UP (ref 0–0.2)
BASOPHILS NFR BLD AUTO: 0.3 % — SIGNIFICANT CHANGE UP (ref 0–2)
BILIRUB SERPL-MCNC: 0.6 MG/DL — SIGNIFICANT CHANGE UP (ref 0.2–1.2)
BUN SERPL-MCNC: 20 MG/DL — SIGNIFICANT CHANGE UP (ref 7–23)
CALCIUM SERPL-MCNC: 9.1 MG/DL — SIGNIFICANT CHANGE UP (ref 8.4–10.5)
CHLORIDE SERPL-SCNC: 105 MMOL/L — SIGNIFICANT CHANGE UP (ref 96–108)
CO2 SERPL-SCNC: 25 MMOL/L — SIGNIFICANT CHANGE UP (ref 22–31)
CREAT SERPL-MCNC: 1.11 MG/DL — SIGNIFICANT CHANGE UP (ref 0.5–1.3)
EGFR: 54 ML/MIN/1.73M2 — LOW
EOSINOPHIL # BLD AUTO: 0.01 K/UL — SIGNIFICANT CHANGE UP (ref 0–0.5)
EOSINOPHIL NFR BLD AUTO: 0.1 % — SIGNIFICANT CHANGE UP (ref 0–6)
ESTIMATED AVERAGE GLUCOSE: 97 MG/DL — SIGNIFICANT CHANGE UP (ref 68–114)
GLUCOSE SERPL-MCNC: 118 MG/DL — HIGH (ref 70–99)
HCT VFR BLD CALC: 34.9 % — SIGNIFICANT CHANGE UP (ref 34.5–45)
HCV AB S/CO SERPL IA: 0.11 S/CO — SIGNIFICANT CHANGE UP (ref 0–0.99)
HCV AB SERPL-IMP: SIGNIFICANT CHANGE UP
HGB BLD-MCNC: 10.6 G/DL — LOW (ref 11.5–15.5)
IMM GRANULOCYTES NFR BLD AUTO: 0.3 % — SIGNIFICANT CHANGE UP (ref 0–0.9)
LACTATE SERPL-SCNC: 2 MMOL/L — SIGNIFICANT CHANGE UP (ref 0.7–2)
LYMPHOCYTES # BLD AUTO: 15.8 % — SIGNIFICANT CHANGE UP (ref 13–44)
LYMPHOCYTES # BLD AUTO: 2.28 K/UL — SIGNIFICANT CHANGE UP (ref 1–3.3)
MCHC RBC-ENTMCNC: 27 PG — SIGNIFICANT CHANGE UP (ref 27–34)
MCHC RBC-ENTMCNC: 30.4 GM/DL — LOW (ref 32–36)
MCV RBC AUTO: 88.8 FL — SIGNIFICANT CHANGE UP (ref 80–100)
MONOCYTES # BLD AUTO: 1.07 K/UL — HIGH (ref 0–0.9)
MONOCYTES NFR BLD AUTO: 7.4 % — SIGNIFICANT CHANGE UP (ref 2–14)
NEUTROPHILS # BLD AUTO: 10.97 K/UL — HIGH (ref 1.8–7.4)
NEUTROPHILS NFR BLD AUTO: 76.1 % — SIGNIFICANT CHANGE UP (ref 43–77)
NRBC # BLD: 0 /100 WBCS — SIGNIFICANT CHANGE UP (ref 0–0)
PLATELET # BLD AUTO: 368 K/UL — SIGNIFICANT CHANGE UP (ref 150–400)
POTASSIUM SERPL-MCNC: 4.2 MMOL/L — SIGNIFICANT CHANGE UP (ref 3.5–5.3)
POTASSIUM SERPL-SCNC: 4.2 MMOL/L — SIGNIFICANT CHANGE UP (ref 3.5–5.3)
PROCALCITONIN SERPL-MCNC: 0.33 NG/ML — HIGH
PROT SERPL-MCNC: 7.1 G/DL — SIGNIFICANT CHANGE UP (ref 6–8.3)
RAPID RVP RESULT: SIGNIFICANT CHANGE UP
RBC # BLD: 3.93 M/UL — SIGNIFICANT CHANGE UP (ref 3.8–5.2)
RBC # FLD: 23.7 % — HIGH (ref 10.3–14.5)
SARS-COV-2 RNA SPEC QL NAA+PROBE: SIGNIFICANT CHANGE UP
SODIUM SERPL-SCNC: 142 MMOL/L — SIGNIFICANT CHANGE UP (ref 135–145)
WBC # BLD: 14.42 K/UL — HIGH (ref 3.8–10.5)
WBC # FLD AUTO: 14.42 K/UL — HIGH (ref 3.8–10.5)

## 2024-02-27 PROCEDURE — 99223 1ST HOSP IP/OBS HIGH 75: CPT | Mod: GC

## 2024-02-27 PROCEDURE — 93010 ELECTROCARDIOGRAM REPORT: CPT

## 2024-02-27 RX ORDER — AZITHROMYCIN 500 MG/1
500 TABLET, FILM COATED ORAL ONCE
Refills: 0 | Status: COMPLETED | OUTPATIENT
Start: 2024-02-27 | End: 2024-02-27

## 2024-02-27 RX ORDER — CEFTRIAXONE 500 MG/1
1000 INJECTION, POWDER, FOR SOLUTION INTRAMUSCULAR; INTRAVENOUS EVERY 24 HOURS
Refills: 0 | Status: DISCONTINUED | OUTPATIENT
Start: 2024-02-28 | End: 2024-02-29

## 2024-02-27 RX ORDER — ACETAMINOPHEN 500 MG
650 TABLET ORAL EVERY 6 HOURS
Refills: 0 | Status: DISCONTINUED | OUTPATIENT
Start: 2024-02-27 | End: 2024-02-29

## 2024-02-27 RX ORDER — FOLIC ACID 0.8 MG
1 TABLET ORAL DAILY
Refills: 0 | Status: DISCONTINUED | OUTPATIENT
Start: 2024-02-27 | End: 2024-02-29

## 2024-02-27 RX ORDER — ATORVASTATIN CALCIUM 80 MG/1
20 TABLET, FILM COATED ORAL AT BEDTIME
Refills: 0 | Status: DISCONTINUED | OUTPATIENT
Start: 2024-02-27 | End: 2024-02-29

## 2024-02-27 RX ORDER — SODIUM CHLORIDE 9 MG/ML
1000 INJECTION INTRAMUSCULAR; INTRAVENOUS; SUBCUTANEOUS
Refills: 0 | Status: DISCONTINUED | OUTPATIENT
Start: 2024-02-27 | End: 2024-02-28

## 2024-02-27 RX ORDER — INFLUENZA VIRUS VACCINE 15; 15; 15; 15 UG/.5ML; UG/.5ML; UG/.5ML; UG/.5ML
0.7 SUSPENSION INTRAMUSCULAR ONCE
Refills: 0 | Status: DISCONTINUED | OUTPATIENT
Start: 2024-02-27 | End: 2024-02-29

## 2024-02-27 RX ORDER — LANOLIN ALCOHOL/MO/W.PET/CERES
3 CREAM (GRAM) TOPICAL AT BEDTIME
Refills: 0 | Status: DISCONTINUED | OUTPATIENT
Start: 2024-02-27 | End: 2024-02-29

## 2024-02-27 RX ORDER — AZITHROMYCIN 500 MG/1
500 TABLET, FILM COATED ORAL EVERY 24 HOURS
Refills: 0 | Status: DISCONTINUED | OUTPATIENT
Start: 2024-02-28 | End: 2024-02-29

## 2024-02-27 RX ORDER — CEFTRIAXONE 500 MG/1
1000 INJECTION, POWDER, FOR SOLUTION INTRAMUSCULAR; INTRAVENOUS ONCE
Refills: 0 | Status: COMPLETED | OUTPATIENT
Start: 2024-02-27 | End: 2024-02-27

## 2024-02-27 RX ORDER — ONDANSETRON 8 MG/1
4 TABLET, FILM COATED ORAL EVERY 8 HOURS
Refills: 0 | Status: DISCONTINUED | OUTPATIENT
Start: 2024-02-27 | End: 2024-02-29

## 2024-02-27 RX ORDER — HEPARIN SODIUM 5000 [USP'U]/ML
5000 INJECTION INTRAVENOUS; SUBCUTANEOUS EVERY 12 HOURS
Refills: 0 | Status: DISCONTINUED | OUTPATIENT
Start: 2024-02-27 | End: 2024-02-28

## 2024-02-27 RX ORDER — MORPHINE SULFATE 50 MG/1
2 CAPSULE, EXTENDED RELEASE ORAL EVERY 4 HOURS
Refills: 0 | Status: DISCONTINUED | OUTPATIENT
Start: 2024-02-27 | End: 2024-02-28

## 2024-02-27 RX ADMIN — HEPARIN SODIUM 5000 UNIT(S): 5000 INJECTION INTRAVENOUS; SUBCUTANEOUS at 05:59

## 2024-02-27 RX ADMIN — SODIUM CHLORIDE 100 MILLILITER(S): 9 INJECTION INTRAMUSCULAR; INTRAVENOUS; SUBCUTANEOUS at 21:36

## 2024-02-27 RX ADMIN — SODIUM CHLORIDE 100 MILLILITER(S): 9 INJECTION INTRAMUSCULAR; INTRAVENOUS; SUBCUTANEOUS at 05:59

## 2024-02-27 RX ADMIN — HEPARIN SODIUM 5000 UNIT(S): 5000 INJECTION INTRAVENOUS; SUBCUTANEOUS at 17:47

## 2024-02-27 RX ADMIN — Medication 650 MILLIGRAM(S): at 03:14

## 2024-02-27 RX ADMIN — CEFTRIAXONE 100 MILLIGRAM(S): 500 INJECTION, POWDER, FOR SOLUTION INTRAMUSCULAR; INTRAVENOUS at 02:35

## 2024-02-27 RX ADMIN — ATORVASTATIN CALCIUM 20 MILLIGRAM(S): 80 TABLET, FILM COATED ORAL at 21:36

## 2024-02-27 RX ADMIN — Medication 1 MILLIGRAM(S): at 14:44

## 2024-02-27 RX ADMIN — ONDANSETRON 4 MILLIGRAM(S): 8 TABLET, FILM COATED ORAL at 03:10

## 2024-02-27 RX ADMIN — AZITHROMYCIN 255 MILLIGRAM(S): 500 TABLET, FILM COATED ORAL at 03:11

## 2024-02-27 RX ADMIN — Medication 650 MILLIGRAM(S): at 03:53

## 2024-02-27 NOTE — H&P ADULT - ASSESSMENT
68-year-old female past medical history of CLL presenting for epigastric pain, nausea, and vomiting. Patient reports experiencing abdominal pain, nausea and vomiting after eating an egg salad sandwich earlier today. To alleviate her symptoms, pt took zofran at home with no improvement. Reports having about 3-4 episodes of nonbloody non bilious emesis and about 4 non-watery, non bloody formed stools. s/p 2U PRBCs, 2 IV iron infusions and endoscopy within the past two weeks. Admitted to medicine for further evaluation.       #Small Bowel Obstruction  #Nausea/Vomitting  #Abdominal pain  -Admit to medicine  -s/p 1L IVFs, morphine x1 and Ofirmev x1 in ED  -CT A/P: +SBO   -Surgery Consult: NPO, Advance diet as tolerated, IVFs, No NGT for now  -IV Zofran for nausea/vomiting    #Lung Consolidation on RML and lingula  #Leukocytosis in the setting of CLL   -Pt met  2/4 SIRS criteria with tachycardia and Leukocytosis  -History of CLL and leukocytosis, WBC of 19 on admission. Baseline WBC 12-14 in early February  -EKG: Sinus tachycardia at 108  -CT Chest: + Lung consolidation of RML and lingula  -CTX and Azithromycin x1 ordered  -RVP Panel pending  -Blood cultures x2 ordered  -Urine culture ordered  -Trend CBC    #Elevated Glucose  -A1C in AM    #Elevated Liver function test  -Trend LFTs  -Hepatitis panel pending     #Thyroid nodule  -outpatient follow-up    #B/L Pelvic Adenopathy  -outpatient follow-up    #DVT ppx  -Lovenox 40 daily    #Full Code   68-year-old female past medical history of CLL presenting for epigastric pain, nausea, and vomiting. Patient reports experiencing abdominal pain, nausea and vomiting after eating an egg salad sandwich earlier today. To alleviate her symptoms, pt took zofran at home with no improvement. Reports having about 3-4 episodes of nonbloody non bilious emesis and about 4 non-watery, non bloody formed stools. s/p 2U PRBCs, 2 IV iron infusions and endoscopy within the past two weeks. Admitted to medicine for further evaluation.       #Small Bowel Obstruction  #Nausea/Vomitting  #Epigastric Abdominal pain  -Admit to medicine  -s/p 1L IVFs, morphine x1 and Ofirmev x1 in ED  -CT A/P: +SBO   -Surgery Consult: NPO, Advance diet as tolerated, IVFs, No NGT for now  -f/u lipase level   -IV Zofran for nausea/vomiting    #Lung Consolidation on RML and lingula  #Leukocytosis in the setting of CLL   -Pt met  2/4 SIRS criteria with tachycardia and Leukocytosis  -History of CLL and leukocytosis, WBC of 19 on admission. Baseline WBC 12-14 in early February  -EKG: Sinus tachycardia at 108  -CT Chest: + Lung consolidation of RML and lingula  -CTX and Azithromycin x1 ordered  -RVP Panel pending  -Blood cultures x2 ordered  -Urine culture ordered  -Trend CBC    #Elevated Glucose  -A1C in AM    #Elevated Liver function test  -Trend LFTs  -f/u Hepatitis C     #Thyroid nodule  -outpatient follow-up    #B/L Pelvic Adenopathy  -outpatient follow-up    #DVT ppx  -SubQ Heparin    #Full Code   68-year-old female past medical history of CLL presenting for epigastric pain, nausea, and vomiting. Patient reports experiencing abdominal pain, nausea and vomiting after eating an egg salad sandwich earlier today. To alleviate her symptoms, pt took zofran at home with no improvement. Reports having about 3-4 episodes of nonbloody non bilious emesis and about 4 non-watery, non bloody formed stools. s/p 2U PRBCs, 2 IV iron infusions and endoscopy within the past two weeks. Admitted to medicine for further evaluation.       #Small Bowel Obstruction  #Nausea/Vomitting  #Epigastric Abdominal pain  -Admit to medicine  -s/p 1L IVFs, morphine x1 and Ofirmev x1 in ED  -CT A/P: +SBO   -Surgery Consult: NPO, Advance diet as tolerated, IVFs, No NGT for now  -f/u lipase level   -IV Zofran for nausea/vomiting    #Lung Consolidation on RML and lingula  #Leukocytosis in the setting of CLL   -Pt met  2/4 SIRS criteria with tachycardia and Leukocytosis  -History of CLL and leukocytosis, WBC of 19 on admission. Baseline WBC 12-14 in early February  -EKG: Sinus tachycardia at 108  -CT Chest: + Lung consolidation of RML and lingula  -CT A/P: Air in bladder, suggesting infection   -CTX and Azithromycin x1 ordered  -RVP Panel pending  -Blood cultures x2 ordered  -Urine culture ordered  -Trend CBC    #Elevated Glucose  -A1C in AM    #Elevated Liver function test  -Trend LFTs  -f/u Hepatitis C     #Thyroid nodule  -CT Chest: 12 mm thyroid nodule  -AM TSH level  -outpatient follow-up    #B/L Pelvic Adenopathy  -outpatient follow-up    #DVT ppx  -SubQ Heparin    #Full Code   68-year-old female past medical history of CLL presenting for epigastric pain, nausea, and vomiting. Patient reports experiencing abdominal pain, nausea and vomiting after eating an egg salad sandwich earlier today. To alleviate her symptoms, pt took zofran at home with no improvement. Reports having about 3-4 episodes of nonbloody non bilious emesis and about 4 non-watery, non bloody formed stools. s/p 2U PRBCs, 2 IV iron infusions and endoscopy within the past two weeks. Admitted to medicine for further evaluation.       #Small Bowel Obstruction  #Nausea/Vomitting  #Epigastric Abdominal pain  -Admit to medicine  -s/p 1L IVFs, morphine x1 and Ofirmev x1 in ED  -CT A/P: +SBO   -Surgery Consult: NPO, Advance diet as tolerated, IVFs, No NGT for now  -f/u lipase level   -IV Zofran for nausea/vomiting    #Lung Consolidation on RML and lingula  #Leukocytosis in the setting of CLL   -Pt met  2/4 SIRS criteria with tachycardia and Leukocytosis  -History of CLL and leukocytosis, WBC of 19 on admission. Baseline WBC 12-14 in early February  -EKG: Sinus tachycardia at 108  -CT Chest: + Lung consolidation of RML and lingula  -CT A/P: Air in bladder, suggesting infection   -CTX and Azithromycin   -RVP Panel pending  -Blood cultures x2 ordered  -Urine culture ordered  -Trend CBC    #Elevated Glucose  -A1C in AM    #Elevated Liver function test  -Trend LFTs  -f/u Hepatitis C     #Thyroid nodule  -CT Chest: 12 mm thyroid nodule  -AM TSH level  -outpatient follow-up    #B/L Pelvic Adenopathy  -outpatient follow-up    #DVT ppx  -SubQ Heparin    #Full Code

## 2024-02-27 NOTE — CONSULT NOTE ADULT - SUBJECTIVE AND OBJECTIVE BOX
HPI:   Patient is a 68y female who was diagnosed with CLL a few months ago but no specific treatment needed for now. She had a routine mammogram and found to have axillary adenopathy. The node was biopsied and that is how it was found. She also developed new onset anemia with FE deficiency. She was placed on FE infusion and had a transfusion and also had an egd and bm bx. She was feeling fine otherwise until yesterday when she developed sudden onset of epigastric pain and vomiting. She was found to have an SBO and some pulmonary infiltrates. She is now on ctx and zithro. The sbo is being managed with just bowel rest, no surgery or ngt for now. She is beginning to pass gas. She had no blood in the vomit and yesterday had a normal bm , no diarrhea, no blood . She is not sob. She has a mild dry cough since october when she had covid but no fever or worse cough. No travel. is exposed to her grandkids who often have viral illnesses. patient is planned for a colonoscopy in the near future    REVIEW OF SYSTEMS:  All other review of systems negative (Comprehensive ROS)    PAST MEDICAL & SURGICAL HISTORY:  GERD (gastroesophageal reflux disease)      Hyperlipidemia      CLL (chronic lymphocytic leukemia)      S/P lymph node biopsy      History of bone marrow biopsy          Allergies    penicillin (Unknown)  Zantac (Hives)    Intolerances        Antimicrobials Day #  :2 ctx and zithromax    Other Medications:  acetaminophen     Tablet .. 650 milliGRAM(s) Oral every 6 hours PRN  aluminum hydroxide/magnesium hydroxide/simethicone Suspension 30 milliLiter(s) Oral every 4 hours PRN  atorvastatin 20 milliGRAM(s) Oral at bedtime  folic acid 1 milliGRAM(s) Oral daily  heparin   Injectable 5000 Unit(s) SubCutaneous every 12 hours  influenza  Vaccine (HIGH DOSE) 0.7 milliLiter(s) IntraMuscular once  melatonin 3 milliGRAM(s) Oral at bedtime PRN  morphine  - Injectable 2 milliGRAM(s) IV Push every 4 hours PRN  ondansetron Injectable 4 milliGRAM(s) IV Push every 8 hours PRN  sodium chloride 0.9%. 1000 milliLiter(s) IV Continuous <Continuous>      FAMILY HISTORY:  FH: breast cancer    FH: COPD (chronic obstructive pulmonary disease)    Family history of CVA    FHx: diabetes mellitus        SOCIAL HISTORY:  Smoking: [ ]Yes [x ]No  ETOH: [ ]Yesx [ ]No  Drug Use: [ ]Yes [x ]No   [x ] Single[ ]    T(F): 97.8 (02-27-24 @ 12:04), Max: 98.8 (02-26-24 @ 20:49)  HR: 82 (02-27-24 @ 12:04)  BP: 115/71 (02-27-24 @ 12:04)  RR: 16 (02-27-24 @ 12:04)  SpO2: 97% (02-27-24 @ 12:04)  Wt(kg): --    PHYSICAL EXAM:  General: alert, no acute distress  Eyes:  anicteric, no conjunctival injection, no discharge  Oropharynx: no lesions or injection 	  Neck: supple, without adenopathy  Lungs: clear to auscultation  Heart: regular rate and rhythm; no murmur, rubs or gallops  Abdomen: soft, nondistended, nontender, without mass or organomegaly  Skin: no lesions  Extremities: no clubbing, cyanosis, or edema  Neurologic: alert, oriented, moves all extremities    LAB RESULTS:                        10.6   14.42 )-----------( 368      ( 27 Feb 2024 06:09 )             34.9     02-27    142  |  105  |  20  ----------------------------<  118<H>  4.2   |  25  |  1.11    Ca    9.1      27 Feb 2024 06:09    TPro  7.1  /  Alb  3.3  /  TBili  0.6  /  DBili  x   /  AST  27  /  ALT  42  /  AlkPhos  87  02-27    LIVER FUNCTIONS - ( 27 Feb 2024 06:09 )  Alb: 3.3 g/dL / Pro: 7.1 g/dL / ALK PHOS: 87 U/L / ALT: 42 U/L / AST: 27 U/L / GGT: x           Urinalysis Basic - ( 27 Feb 2024 06:09 )    Color: x / Appearance: x / SG: x / pH: x  Gluc: 118 mg/dL / Ketone: x  / Bili: x / Urobili: x   Blood: x / Protein: x / Nitrite: x   Leuk Esterase: x / RBC: x / WBC x   Sq Epi: x / Non Sq Epi: x / Bacteria: x        MICROBIOLOGY:  RECENT CULTURES:        RADIOLOGY REVIEWED:  < from: CT Chest w/ IV Cont (02.26.24 @ 22:06) >    ACC: [id number] EXAM:  CT CHEST IC   ORDERED BY: TATA MCDANIEL     PROCEDURE DATE:  02/26/2024          INTERPRETATION:  CLINICAL INFORMATION: Abdominal pain with nausea and   vomiting.  History of CLL with recent blood transfusion.    COMPARISON: CT scan chest 2/4/2017 and CT scan abdomen and pelvis   7/1/2020.    CONTRAST/COMPLICATIONS:  IV Contrast: Omnipaque 350  90 cc administered   10 cc discarded  Oral Contrast: NONE  Complications: None reported at time of study completion    PROCEDURE:  CT of the Chest, Abdomen and Pelvis was performed.  Sagittal and coronal reformats were performed.    FINDINGS:    CHEST:    LUNGS AND LARGE AIRWAYS: PLEURA:    There are mild bilateral micronodular, tree-in-bud nodular opacities,   which may reflectinflammatory/infectious small airway disease.  There are patchy groundglass and airspace consolidations right middle and   left lingular lobes.  There is mild dependent bibasilar atelectasis.    The central airways are patent.    No pleural effusion or pneumothorax.    VESSELS: Atherosclerotic changes thoracic aorta and coronary artery   calcification.    HEART: Heart size is normal. No pericardial effusion.    MEDIASTINUM AND CHANTEL: No lymphadenopathy.    CHEST WALL AND LOWER NECK:  Bilateral axillary lymphadenopathy, the largest measuring 2 x 1.2 cm in   the right axilla.    12 mm low-density nodule right lobe of the thyroid gland.    ABDOMEN AND PELVIS:    LIVER: Within normal limits.  BILE DUCTS: Normal caliber.  GALLBLADDER: Within normallimits.  SPLEEN: Within normal limits.  PANCREAS: Within normal limits.  ADRENALS: Within normal limits.  KIDNEYS/URETERS: Within normal limits.    BLADDER: Nondependent air within the bladder. Correlate clinically for   recent instrumentation.  REPRODUCTIVE ORGANS: Retroflexed uterus.    BOWEL:   Small hiatal hernia.  Dilated fluid-filled loops of small bowel with fecal as small bowel loop   measuring greater than 3 cm in the pelvis.  There is a abrupt transition in the left lower quadrant (2:123, 601:30).  The distal small bowel loops are collapsed.  Appendix is within normal limits.  PERITONEUM: No ascites.    VESSELS: Atherosclerotic changes.  RETROPERITONEUM/LYMPH NODES:  Subcentimeter para-aortic, aortocaval and common iliac chain lymphnodes.  Bilateral pelvic sidewall obturator chain lymph nodes, the largest   measuring up to 1.8 x 3.2 cm.  Bilateral external iliac chain lymph nodes, the largest measuring up to   1.4 x 1.9 cm.    ABDOMINAL WALL: Within normal limits.    BONES:  Degenerative changes.  Spondylolisthesis L5 on S1.  Chronic right ischiopubic ramus fracture deformity.    IMPRESSION:    Patchy groundglass/consolidation right middle and left lingular lobes is   suggestive of infection/pneumonia.  Bilateral micronodular, tree-in-bud opacities are suggestive of an   laboratory/infectious airway disease.    Bilateral axillary lymphadenopathy.    12 mm nodule right lobe of the thyroid gland.  Correlate clinically.  Recommend further evaluation with thyroid ultrasonography as indicated.    Distal small bowel obstruction with transition the left lower quadrant as   discussed.    Bilateral pelvic lymphadenopathy as discussed.    Intraluminal air urinary bladder, correlate clinically for recent   instrumentation.    Other findings as discussed above.    This study was preliminary reported by [facility].    --- End of Report ---        < end of copied text >          Impression:    patient with recent dx of CLL and fe def anemia, no CLL tx, admitted yesterday with acute onset of abdomen pain and vomiting. She was found to have an sbo and some infiltrates. Suspect she may have some aspiration pneumonia from vomiting. RVP is negative. Doubt legionella but will keep zithro pending further data. It is theoretically possible that she has bacterial pneumonia as a separate issue since she has cll and at risk for encapsulated organisms but ctx is fine for that too. SBO being managed just with bowel rest for now  It is unclear why she got an sbo since no prior abdomen surgery, maybe from adenopathy but defer to gi further w/u    Recommendations:  continue ceftriaxone and zithromax  f/u blood cx  sbo per surgery and gi  check urine legionella ag HPI:   Patient is a 68y female who was diagnosed with CLL a few months ago but no specific treatment needed for now. She had a routine mammogram and found to have axillary adenopathy. The node was biopsied and that is how it was found. She also developed new onset anemia with FE deficiency. She was placed on FE infusion and had a transfusion and also had an egd and bm bx. She was feeling fine otherwise until yesterday when she developed sudden onset of epigastric pain and vomiting. She was found to have an SBO and some pulmonary infiltrates. She is now on ctx and zithro. The sbo is being managed with just bowel rest, no surgery or ngt for now. She is beginning to pass gas. She had no blood in the vomit and yesterday had a normal bm , no diarrhea, no blood . She is not sob. She has a mild dry cough since october when she had covid but no fever or worse cough. No travel. is exposed to her grandkids who often have viral illnesses. patient is planned for a colonoscopy in the near future    REVIEW OF SYSTEMS:  All other review of systems negative (Comprehensive ROS)    PAST MEDICAL & SURGICAL HISTORY:  GERD (gastroesophageal reflux disease)      Hyperlipidemia      CLL (chronic lymphocytic leukemia)      S/P lymph node biopsy      History of bone marrow biopsy          Allergies    penicillin (Unknown)  Zantac (Hives)    Intolerances        Antimicrobials Day #  :2 ctx and zithromax    Other Medications:  acetaminophen     Tablet .. 650 milliGRAM(s) Oral every 6 hours PRN  aluminum hydroxide/magnesium hydroxide/simethicone Suspension 30 milliLiter(s) Oral every 4 hours PRN  atorvastatin 20 milliGRAM(s) Oral at bedtime  folic acid 1 milliGRAM(s) Oral daily  heparin   Injectable 5000 Unit(s) SubCutaneous every 12 hours  influenza  Vaccine (HIGH DOSE) 0.7 milliLiter(s) IntraMuscular once  melatonin 3 milliGRAM(s) Oral at bedtime PRN  morphine  - Injectable 2 milliGRAM(s) IV Push every 4 hours PRN  ondansetron Injectable 4 milliGRAM(s) IV Push every 8 hours PRN  sodium chloride 0.9%. 1000 milliLiter(s) IV Continuous <Continuous>      FAMILY HISTORY:  FH: breast cancer    FH: COPD (chronic obstructive pulmonary disease)    Family history of CVA    FHx: diabetes mellitus        SOCIAL HISTORY:  Smoking: [ ]Yes [x ]No  ETOH: [ ]Yesx [ ]No  Drug Use: [ ]Yes [x ]No   [x ] Single[ ]    T(F): 97.8 (02-27-24 @ 12:04), Max: 98.8 (02-26-24 @ 20:49)  HR: 82 (02-27-24 @ 12:04)  BP: 115/71 (02-27-24 @ 12:04)  RR: 16 (02-27-24 @ 12:04)  SpO2: 97% (02-27-24 @ 12:04)  Wt(kg): --    PHYSICAL EXAM:  General: alert, no acute distress  Eyes:  anicteric, no conjunctival injection, no discharge  Oropharynx: no lesions or injection 	  Neck: supple, without adenopathy  Lungs: clear to auscultation  Heart: regular rate and rhythm; no murmur, rubs or gallops  Abdomen: soft, nondistended, nontender, without mass or organomegaly  Skin: no lesions  Extremities: no clubbing, cyanosis, or edema  Neurologic: alert, oriented, moves all extremities    LAB RESULTS:                        10.6   14.42 )-----------( 368      ( 27 Feb 2024 06:09 )             34.9     02-27    142  |  105  |  20  ----------------------------<  118<H>  4.2   |  25  |  1.11    Ca    9.1      27 Feb 2024 06:09    TPro  7.1  /  Alb  3.3  /  TBili  0.6  /  DBili  x   /  AST  27  /  ALT  42  /  AlkPhos  87  02-27    LIVER FUNCTIONS - ( 27 Feb 2024 06:09 )  Alb: 3.3 g/dL / Pro: 7.1 g/dL / ALK PHOS: 87 U/L / ALT: 42 U/L / AST: 27 U/L / GGT: x           Urinalysis Basic - ( 27 Feb 2024 06:09 )    Color: x / Appearance: x / SG: x / pH: x  Gluc: 118 mg/dL / Ketone: x  / Bili: x / Urobili: x   Blood: x / Protein: x / Nitrite: x   Leuk Esterase: x / RBC: x / WBC x   Sq Epi: x / Non Sq Epi: x / Bacteria: x        MICROBIOLOGY:  RECENT CULTURES:        RADIOLOGY REVIEWED:  < from: CT Chest w/ IV Cont (02.26.24 @ 22:06) >    ACC: [id number] EXAM:  CT CHEST IC   ORDERED BY: TATA MCDANIEL     PROCEDURE DATE:  02/26/2024          INTERPRETATION:  CLINICAL INFORMATION: Abdominal pain with nausea and   vomiting.  History of CLL with recent blood transfusion.    COMPARISON: CT scan chest 2/4/2017 and CT scan abdomen and pelvis   7/1/2020.    CONTRAST/COMPLICATIONS:  IV Contrast: Omnipaque 350  90 cc administered   10 cc discarded  Oral Contrast: NONE  Complications: None reported at time of study completion    PROCEDURE:  CT of the Chest, Abdomen and Pelvis was performed.  Sagittal and coronal reformats were performed.    FINDINGS:    CHEST:    LUNGS AND LARGE AIRWAYS: PLEURA:    There are mild bilateral micronodular, tree-in-bud nodular opacities,   which may reflectinflammatory/infectious small airway disease.  There are patchy groundglass and airspace consolidations right middle and   left lingular lobes.  There is mild dependent bibasilar atelectasis.    The central airways are patent.    No pleural effusion or pneumothorax.    VESSELS: Atherosclerotic changes thoracic aorta and coronary artery   calcification.    HEART: Heart size is normal. No pericardial effusion.    MEDIASTINUM AND CHANTEL: No lymphadenopathy.    CHEST WALL AND LOWER NECK:  Bilateral axillary lymphadenopathy, the largest measuring 2 x 1.2 cm in   the right axilla.    12 mm low-density nodule right lobe of the thyroid gland.    ABDOMEN AND PELVIS:    LIVER: Within normal limits.  BILE DUCTS: Normal caliber.  GALLBLADDER: Within normallimits.  SPLEEN: Within normal limits.  PANCREAS: Within normal limits.  ADRENALS: Within normal limits.  KIDNEYS/URETERS: Within normal limits.    BLADDER: Nondependent air within the bladder. Correlate clinically for   recent instrumentation.  REPRODUCTIVE ORGANS: Retroflexed uterus.    BOWEL:   Small hiatal hernia.  Dilated fluid-filled loops of small bowel with fecal as small bowel loop   measuring greater than 3 cm in the pelvis.  There is a abrupt transition in the left lower quadrant (2:123, 601:30).  The distal small bowel loops are collapsed.  Appendix is within normal limits.  PERITONEUM: No ascites.    VESSELS: Atherosclerotic changes.  RETROPERITONEUM/LYMPH NODES:  Subcentimeter para-aortic, aortocaval and common iliac chain lymphnodes.  Bilateral pelvic sidewall obturator chain lymph nodes, the largest   measuring up to 1.8 x 3.2 cm.  Bilateral external iliac chain lymph nodes, the largest measuring up to   1.4 x 1.9 cm.    ABDOMINAL WALL: Within normal limits.    BONES:  Degenerative changes.  Spondylolisthesis L5 on S1.  Chronic right ischiopubic ramus fracture deformity.    IMPRESSION:    Patchy groundglass/consolidation right middle and left lingular lobes is   suggestive of infection/pneumonia.  Bilateral micronodular, tree-in-bud opacities are suggestive of an   laboratory/infectious airway disease.    Bilateral axillary lymphadenopathy.    12 mm nodule right lobe of the thyroid gland.  Correlate clinically.  Recommend further evaluation with thyroid ultrasonography as indicated.    Distal small bowel obstruction with transition the left lower quadrant as   discussed.    Bilateral pelvic lymphadenopathy as discussed.    Intraluminal air urinary bladder, correlate clinically for recent   instrumentation.    Other findings as discussed above.    This study was preliminary reported by [facility].    --- End of Report ---        < end of copied text >          Impression:    patient with recent dx of CLL and fe def anemia, no CLL tx, admitted yesterday with acute onset of abdomen pain and vomiting. She was found to have an sbo and some infiltrates. Suspect she may have some aspiration pneumonia from vomiting. RVP is negative. Doubt legionella but will keep zithro pending further data. It is theoretically possible that she has bacterial pneumonia as a separate issue since she has cll and at risk for encapsulated organisms but ctx is fine for that too. SBO being managed just with bowel rest for now  It is unclear why she got an sbo since no prior abdomen surgery, maybe from adenopathy but defer to gi further w/u, she has a gi as outpt but may need to see one here    Recommendations:  continue ceftriaxone and zithromax  f/u blood cx  sbo per surgery and would consider gi evaluation  check urine legionella ag

## 2024-02-27 NOTE — H&P ADULT - HISTORY OF PRESENT ILLNESS
68-year-old female past medical history of CLL presenting to the emergency department with 7 hours of epigastric pain and nausea vomiting.  Differential is broad and includes pancreatitis, gastroenteritis, food poisoning, biliary colic.  Will get a series of laboratory studies IV fluids antiemetics and pain control.  I will get a scan of this patient as well being that she is being diagnosed with CLL and has not had previous imaging.  It is possible this is an SBO although it is unlikely based on the overall presentation.  Once results of imaging labs are back we will see if the patient can tolerate p.o.  At that point in time we will come up with a disposition patient will be signed out to the night physician pending workup.    update: ct shows SBO. Patient clinically improved.  No vomiting for the last 3 to 4 hours.  Pain better but not resolved.  Still with slight epigastric tenderness.  WBC 19 K. h/o CLL. Recent WBC from February 12 to 14.5. .  No fever.  CT chest also showing consolidative airspace disease right middle lobe and lingula.  No hypoxia or shortness of breath or chest pain.  Patient states she has chronic cough for about 2 months.  No clinical evidence of acute pneumonia.  Will defer antibiotics for now. .case d/w attending surgeon dr Cuello. bowel rest, iv fluids. does not recommend NGT at this time as no vomiting and nondistended stomach. Discussed with hospitalist attending Dr. Mckeon, accepting admission 68-year-old female past medical history of CLL presenting for epigastric pain, nausea, and vomiting. Patient reports experiencing abdominal pain, nausea and vomiting after eating an egg salad sandwich earlier today. To alleviate her symptoms, pt took zofran at home with no improvement. Reports having about 3-4 episodes of nonbloody non bilious emesis and about 4 non-watery, non bloody formed stools. States that she recently received a blood transfusion about two weeks ago when Hgb was found to be 7.4-7.5. Reports dyspnea at that time. She was then given an IV iron infusion for suspected iron deficiency anemia. She underwent an endoscopy with a second blood transfusion since Hgb was about 8 at time of procedure. Baseline Hgb about 11-12. Pt states that her last IV iron infusion was on Thursday.     Of note, pt reports chronic dry cough and nasal congestion. Attributes to history of COVID about two months ago. Denies current dyspnea.     In the ED, pt was tachycardic. Labs were significant for a WBC 19.43, Glucose 181 and ALT 48. Recent WBC from February 12 to 14.5. EKG Sinus Tachycardia at 108, personally reviewed. CT A/P: + SBO and, b/l pelvic adenopathy and air at bladder. CT Chest: +consolidation on RML and lingula, 12 mm thyroid nodule, b/l axillary adenopathy. Given morphine x1 and Ofirmev x1 for pain, Zofran x1 for nausea/vomitting and 1L IVF bolus. Admitted to medicine for further evaluation.        68-year-old female past medical history of CLL presenting for epigastric pain, nausea, and vomiting. Patient reports experiencing abdominal pain, nausea and vomiting after eating an egg salad sandwich earlier today. To alleviate her symptoms, pt took zofran at home with no improvement. Reports having about 3-4 episodes of nonbloody non bilious emesis and about 4 non-watery, non bloody formed stools. States that she recently received a blood transfusion about two weeks ago when Hgb was found to be 7.4-7.5. Reports dyspnea at that time. She was then given an IV iron infusion for suspected iron deficiency anemia. She underwent an endoscopy with a second blood transfusion since Hgb was about 8 at time of procedure. Baseline Hgb about 11-12. Pt states that her last IV iron infusion was on Thursday.     Of note, pt reports chronic dry cough and nasal congestion. Attributes to history of COVID about two months ago. Denies current dyspnea.     In the ED, pt was tachycardic. Labs were significant for a WBC 19.43, Glucose 181 and ALT 48. Recent WBC from February 12 to 14.5. EKG Sinus Tachycardia at 108, personally reviewed. CT A/P: + SBO and, b/l pelvic adenopathy and air at bladder on preliminary read. CT Chest: +consolidation on RML and lingula, 12 mm thyroid nodule, b/l axillary adenopathy on preliminary read. Given morphine x1 and Ofirmev x1 for pain, Zofran x1 for nausea/vomitting and 1L IVF bolus. Admitted to medicine for further evaluation.

## 2024-02-27 NOTE — DISCUSSION/SUMMARY
[FreeTextEntry1] : EKG today shows:   Sinus Rhythm at 82 bpm.  Normal intervals and axis.  Unremarkable tracing; no change.  PLAN 1.  HLD - continue Crestor at her current dose.   - Will recheck labs  2.  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 - no tx. needed.  3.  Palps - remain quiescent at this time.  34  minutes spent on today's office visit.   I asked her to return here in 6 months.

## 2024-02-27 NOTE — H&P ADULT - NSHPPHYSICALEXAM_GEN_ALL_CORE
LOS:     VITALS:   T(C): 37.1 (02-26-24 @ 20:49), Max: 37.1 (02-26-24 @ 20:49)  HR: 108 (02-27-24 @ 00:49) (108 - 112)  BP: 108/77 (02-27-24 @ 00:49) (108/77 - 143/83)  RR: 18 (02-27-24 @ 00:49) (18 - 18)  SpO2: 94% (02-27-24 @ 00:49) (94% - 95%)    GENERAL: NAD, lying in bed comfortably  HEAD:  Atraumatic, Normocephalic  EYES: EOMI,  conjunctiva and sclera clear  ENT: Moist mucous membranes  NECK: Supple  CHEST/LUNG: Clear to auscultation bilaterally; No rales, rhonchi, wheezing, or rubs. Unlabored respirations  HEART: +Tachycardia, Regular rhythm; + murmur  ABDOMEN: BSx4; Soft, +mild TTP diffusely, mostly epigastric region. mild distention   EXTREMITIES:  2+ Peripheral Pulses, brisk capillary refill. No clubbing, cyanosis, or edema  NERVOUS SYSTEM:  A&Ox3, no focal deficits   SKIN: No rashes or lesions

## 2024-02-27 NOTE — H&P ADULT - NSICDXFAMILYHX_GEN_ALL_CORE_FT
FAMILY HISTORY:  Family history of CVA  FH: breast cancer  FH: COPD (chronic obstructive pulmonary disease)  FHx: diabetes mellitus

## 2024-02-27 NOTE — PROGRESS NOTE ADULT - TIME BILLING
care coordination, plan of care discussed with patient face to face, nephew whom patient gives permission to be involved in care Dr Osiel Mcdonough and 3E IDR team

## 2024-02-27 NOTE — REASON FOR VISIT
[FreeTextEntry1] : PATIENT CANCELLED APPT.   PRELIMINARY NOTE   Emily Rae returns re HLD and a family hx. of heart disease.

## 2024-02-27 NOTE — H&P ADULT - ATTENDING COMMENTS
I have personally seen and examined patient on the above date.  I discussed the case with Dr Giraldo and I agree with findings and plan as detailed per note above, which I have amended where appropriate.    Superseding the above.   68F hx of HLD, CLL pw SBO. Pt related abdominal pain associated with nausea and vomiting started in the afternoon. +3-4 normal BMs last one several hrs pta. Denied fevers, +chills, no flank pain or dysuria.   PShx: LN and BM bx  Sochx: denied tob, ETOH or illicit drug use.  FMHx; breast cancer, DM, COPD, CVA  Allergies: PCN unknonw  Meds; Protonix 40, crestor 5mg  FA.   In ED afebrile P: 112 BP: 143/83 sat 95% on RA  PE:  NAD  CV: S1S2, RRR, no mgr  CL: CTA bl   Abd: soft, mild distention, mild diffuse tenderness on palp. no cricket or rigidity.  no CVA tenderness  Ext: neg CCE.  Labs; WBC: 19.43 hgb: 11.9 86%N cr: 0.98 UA neg  CT chest:  prelim read  IMPRESSION:  1. 12 mm nodule right lobe thyroid.  2. Consolidative airspace disease the right middle lobe and lingula.  3. Mild bilateral axillary adenopathy largest nodes 2.4 x 1.3 cm.  CTAP:   prelim read   IMPRESSION:  1. Bilateral pelvic adenopathy largest 3.2 x 1.8 cm.  2. Small bowel obstruction.  3. Air at the bladder, see above comments.  EKG: personally rev. sinus tachy at 107bpm no acute ST changes    AP: 68F hx of HLD, CLL pw SBO    #SBO  no hx of prior abd surgeries.   NPO, bowel rest, IVFs.   Surgery already consulted by ED. recc noted.  #Pna on CT chest  with assoc leukocytosis, +SIRS  IV ceftriaxone and azithromycin for now  FU official read of CT chest  #HLD  cont statin  #DVT/GI proph.     Christophe MARX rev.  D/W Dr Rivera ED.       Time spent includes direct patient care (interview and examination of patient), discussion with other providers, support staff and/or patient's family members, review of medical records, ordering diagnostic tests and analyzing results, and documentation.

## 2024-02-27 NOTE — H&P ADULT - NSICDXPASTSURGICALHX_GEN_ALL_CORE_FT
PAST SURGICAL HISTORY:  S/P lymph node biopsy      PAST SURGICAL HISTORY:  History of bone marrow biopsy     S/P lymph node biopsy

## 2024-02-27 NOTE — HISTORY OF PRESENT ILLNESS
[FreeTextEntry1] : She has a family history of heart disease.  Her father required CABG at approx. age 60; he  at 72 of a myocardial infarction.  Her paternal grandfather also had heart disease; he  at approx. age 50 of an MI.  Her mother also has heart disease, late in life; she had a SAVR at age 79, hx. of PPM and small heart attack.   She passed away at age 97.  Emily has a history of HLD, treated with Crestor.  She has suffered occasional random palps in the past.  As she returns today,    she suffered a fracture of the medial condyle of the left femur in July.  This was seen on MRI imaging.  She is seeing an orthopedist affiliated with [facility] in [facility] and currently is wearing a supportive brace which permits her to ambulate.  From a cardiac standpoint, with her current activities she describes no symptoms.  There have been no episodes exertional CP or significant IGNACIO.  She describes no recent palps.    Medications are unchanged.

## 2024-02-27 NOTE — PATIENT PROFILE ADULT - FALL HARM RISK - HARM RISK INTERVENTIONS

## 2024-02-28 DIAGNOSIS — K56.609 UNSPECIFIED INTESTINAL OBSTRUCTION, UNSPECIFIED AS TO PARTIAL VERSUS COMPLETE OBSTRUCTION: ICD-10-CM

## 2024-02-28 DIAGNOSIS — D64.9 ANEMIA, UNSPECIFIED: ICD-10-CM

## 2024-02-28 LAB
ALBUMIN SERPL ELPH-MCNC: 3.2 G/DL — LOW (ref 3.3–5)
ALP SERPL-CCNC: 82 U/L — SIGNIFICANT CHANGE UP (ref 40–120)
ALT FLD-CCNC: 69 U/L — HIGH (ref 10–45)
ANION GAP SERPL CALC-SCNC: 13 MMOL/L — SIGNIFICANT CHANGE UP (ref 5–17)
AST SERPL-CCNC: 61 U/L — HIGH (ref 10–40)
BILIRUB SERPL-MCNC: 0.4 MG/DL — SIGNIFICANT CHANGE UP (ref 0.2–1.2)
BUN SERPL-MCNC: 12 MG/DL — SIGNIFICANT CHANGE UP (ref 7–23)
CALCIUM SERPL-MCNC: 9.1 MG/DL — SIGNIFICANT CHANGE UP (ref 8.4–10.5)
CHLORIDE SERPL-SCNC: 110 MMOL/L — HIGH (ref 96–108)
CO2 SERPL-SCNC: 22 MMOL/L — SIGNIFICANT CHANGE UP (ref 22–31)
CREAT SERPL-MCNC: 0.82 MG/DL — SIGNIFICANT CHANGE UP (ref 0.5–1.3)
CULTURE RESULTS: SIGNIFICANT CHANGE UP
EGFR: 78 ML/MIN/1.73M2 — SIGNIFICANT CHANGE UP
GLUCOSE SERPL-MCNC: 89 MG/DL — SIGNIFICANT CHANGE UP (ref 70–99)
HCT VFR BLD CALC: 34.3 % — LOW (ref 34.5–45)
HGB BLD-MCNC: 10.2 G/DL — LOW (ref 11.5–15.5)
LEGIONELLA AG UR QL: NEGATIVE — SIGNIFICANT CHANGE UP
MCHC RBC-ENTMCNC: 26.9 PG — LOW (ref 27–34)
MCHC RBC-ENTMCNC: 29.7 GM/DL — LOW (ref 32–36)
MCV RBC AUTO: 90.5 FL — SIGNIFICANT CHANGE UP (ref 80–100)
NRBC # BLD: 0 /100 WBCS — SIGNIFICANT CHANGE UP (ref 0–0)
PLATELET # BLD AUTO: 360 K/UL — SIGNIFICANT CHANGE UP (ref 150–400)
POTASSIUM SERPL-MCNC: 4.7 MMOL/L — SIGNIFICANT CHANGE UP (ref 3.5–5.3)
POTASSIUM SERPL-SCNC: 4.7 MMOL/L — SIGNIFICANT CHANGE UP (ref 3.5–5.3)
PROT SERPL-MCNC: 7 G/DL — SIGNIFICANT CHANGE UP (ref 6–8.3)
RBC # BLD: 3.79 M/UL — LOW (ref 3.8–5.2)
RBC # FLD: 23.9 % — HIGH (ref 10.3–14.5)
SODIUM SERPL-SCNC: 145 MMOL/L — SIGNIFICANT CHANGE UP (ref 135–145)
SPECIMEN SOURCE: SIGNIFICANT CHANGE UP
T3FREE SERPL-MCNC: 2.78 PG/ML — SIGNIFICANT CHANGE UP (ref 2–4.4)
T4 FREE SERPL-MCNC: 1.3 NG/DL — SIGNIFICANT CHANGE UP (ref 0.9–1.8)
TSH SERPL-MCNC: 1.68 UIU/ML — SIGNIFICANT CHANGE UP (ref 0.36–3.74)
WBC # BLD: 9.95 K/UL — SIGNIFICANT CHANGE UP (ref 3.8–10.5)
WBC # FLD AUTO: 9.95 K/UL — SIGNIFICANT CHANGE UP (ref 3.8–10.5)

## 2024-02-28 PROCEDURE — 99223 1ST HOSP IP/OBS HIGH 75: CPT | Mod: FS

## 2024-02-28 PROCEDURE — 99233 SBSQ HOSP IP/OBS HIGH 50: CPT | Mod: GC

## 2024-02-28 RX ORDER — SOD SULF/SODIUM/NAHCO3/KCL/PEG
1000 SOLUTION, RECONSTITUTED, ORAL ORAL ONCE
Refills: 0 | Status: COMPLETED | OUTPATIENT
Start: 2024-02-28 | End: 2024-02-28

## 2024-02-28 RX ORDER — FLUTICASONE PROPIONATE 50 MCG
1 SPRAY, SUSPENSION NASAL
Refills: 0 | Status: DISCONTINUED | OUTPATIENT
Start: 2024-02-28 | End: 2024-02-29

## 2024-02-28 RX ADMIN — HEPARIN SODIUM 5000 UNIT(S): 5000 INJECTION INTRAVENOUS; SUBCUTANEOUS at 05:45

## 2024-02-28 RX ADMIN — CEFTRIAXONE 100 MILLIGRAM(S): 500 INJECTION, POWDER, FOR SOLUTION INTRAMUSCULAR; INTRAVENOUS at 01:41

## 2024-02-28 RX ADMIN — Medication 1 MILLIGRAM(S): at 11:53

## 2024-02-28 RX ADMIN — ATORVASTATIN CALCIUM 20 MILLIGRAM(S): 80 TABLET, FILM COATED ORAL at 22:29

## 2024-02-28 RX ADMIN — HEPARIN SODIUM 5000 UNIT(S): 5000 INJECTION INTRAVENOUS; SUBCUTANEOUS at 17:42

## 2024-02-28 RX ADMIN — Medication 1000 MILLILITER(S): at 22:30

## 2024-02-28 RX ADMIN — SODIUM CHLORIDE 100 MILLILITER(S): 9 INJECTION INTRAMUSCULAR; INTRAVENOUS; SUBCUTANEOUS at 05:44

## 2024-02-28 RX ADMIN — AZITHROMYCIN 255 MILLIGRAM(S): 500 TABLET, FILM COATED ORAL at 01:41

## 2024-02-28 RX ADMIN — Medication 1000 MILLILITER(S): at 17:13

## 2024-02-28 NOTE — CONSULT NOTE ADULT - PROBLEM SELECTOR RECOMMENDATION 2
Recent EGD unremarkable. Will plan colonoscopy for tomorrow.  NPO after midnight  Moviprep split-dose prep ordered  Hold SQ heparin after today's dose

## 2024-02-28 NOTE — CONSULT NOTE ADULT - ASSESSMENT
GI consulted for SBO. Patient seen and examined at bedside. Patient states she was in normal state of health until late monday afternoon. She ate an egg salad sandwich from [employer] and began experiencing abdominal pain and vomiting a few hours later so assumed it to be food poisoning. She notes persistent vomiting so presented to ER. Patient denies diarrhea. No melena, brbpr, hematemesis or coffee ground emesis. No fever/chills. Recently diagnosed with CLL and was found to be anemic during workup/blood tests, which led her to have upper endoscopy by Dr. Conner, primary GI. No reported findings. She was scheduled to have colonoscopy at end of month due to anemia. Last Colonoscopy was 5 years ago and reported as normal. No changes in bowels, loss of appetite, unintentional weight loss or early satiety.

## 2024-02-28 NOTE — CONSULT NOTE ADULT - PROBLEM SELECTOR RECOMMENDATION 9
R/O Mass   Clear liquid diet  NPO after midnight  Colonoscopy tomorrow  Movi prep ordered  Hold heparin after todays dose No apparent obstructive symptoms at present.  Continue clear liquid diet.  May need CT enterography +/- small bowel capsule after DC.

## 2024-02-28 NOTE — PROGRESS NOTE ADULT - PROBLEM SELECTOR PLAN 1
improving, will advance diet to regular   FU GI consult  trend labs  DVT/PE Prophylaxis  will continue to follow

## 2024-02-29 ENCOUNTER — TRANSCRIPTION ENCOUNTER (OUTPATIENT)
Age: 69
End: 2024-02-29

## 2024-02-29 ENCOUNTER — NON-APPOINTMENT (OUTPATIENT)
Age: 69
End: 2024-02-29

## 2024-02-29 ENCOUNTER — RESULT REVIEW (OUTPATIENT)
Age: 69
End: 2024-02-29

## 2024-02-29 VITALS
OXYGEN SATURATION: 95 % | HEART RATE: 85 BPM | DIASTOLIC BLOOD PRESSURE: 69 MMHG | RESPIRATION RATE: 16 BRPM | TEMPERATURE: 98 F | SYSTOLIC BLOOD PRESSURE: 120 MMHG

## 2024-02-29 LAB
ALBUMIN SERPL ELPH-MCNC: 3.2 G/DL — LOW (ref 3.3–5)
ALP SERPL-CCNC: 81 U/L — SIGNIFICANT CHANGE UP (ref 40–120)
ALT FLD-CCNC: 64 U/L — HIGH (ref 10–45)
ANION GAP SERPL CALC-SCNC: 12 MMOL/L — SIGNIFICANT CHANGE UP (ref 5–17)
AST SERPL-CCNC: 31 U/L — SIGNIFICANT CHANGE UP (ref 10–40)
BILIRUB SERPL-MCNC: 0.4 MG/DL — SIGNIFICANT CHANGE UP (ref 0.2–1.2)
BUN SERPL-MCNC: 10 MG/DL — SIGNIFICANT CHANGE UP (ref 7–23)
CALCIUM SERPL-MCNC: 9.3 MG/DL — SIGNIFICANT CHANGE UP (ref 8.4–10.5)
CHLORIDE SERPL-SCNC: 110 MMOL/L — HIGH (ref 96–108)
CO2 SERPL-SCNC: 25 MMOL/L — SIGNIFICANT CHANGE UP (ref 22–31)
CREAT SERPL-MCNC: 0.74 MG/DL — SIGNIFICANT CHANGE UP (ref 0.5–1.3)
EGFR: 88 ML/MIN/1.73M2 — SIGNIFICANT CHANGE UP
GLUCOSE SERPL-MCNC: 82 MG/DL — SIGNIFICANT CHANGE UP (ref 70–99)
HCT VFR BLD CALC: 34.2 % — LOW (ref 34.5–45)
HGB BLD-MCNC: 10.2 G/DL — LOW (ref 11.5–15.5)
MCHC RBC-ENTMCNC: 26.8 PG — LOW (ref 27–34)
MCHC RBC-ENTMCNC: 29.8 GM/DL — LOW (ref 32–36)
MCV RBC AUTO: 90 FL — SIGNIFICANT CHANGE UP (ref 80–100)
NRBC # BLD: 0 /100 WBCS — SIGNIFICANT CHANGE UP (ref 0–0)
PLATELET # BLD AUTO: 371 K/UL — SIGNIFICANT CHANGE UP (ref 150–400)
POTASSIUM SERPL-MCNC: 4.1 MMOL/L — SIGNIFICANT CHANGE UP (ref 3.5–5.3)
POTASSIUM SERPL-SCNC: 4.1 MMOL/L — SIGNIFICANT CHANGE UP (ref 3.5–5.3)
PROT SERPL-MCNC: 6.8 G/DL — SIGNIFICANT CHANGE UP (ref 6–8.3)
RBC # BLD: 3.8 M/UL — SIGNIFICANT CHANGE UP (ref 3.8–5.2)
RBC # FLD: 23.6 % — HIGH (ref 10.3–14.5)
SODIUM SERPL-SCNC: 147 MMOL/L — HIGH (ref 135–145)
WBC # BLD: 8.79 K/UL — SIGNIFICANT CHANGE UP (ref 3.8–10.5)
WBC # FLD AUTO: 8.79 K/UL — SIGNIFICANT CHANGE UP (ref 3.8–10.5)

## 2024-02-29 PROCEDURE — 84481 FREE ASSAY (FT-3): CPT

## 2024-02-29 PROCEDURE — 80053 COMPREHEN METABOLIC PANEL: CPT

## 2024-02-29 PROCEDURE — 96375 TX/PRO/DX INJ NEW DRUG ADDON: CPT

## 2024-02-29 PROCEDURE — 45380 COLONOSCOPY AND BIOPSY: CPT

## 2024-02-29 PROCEDURE — 74177 CT ABD & PELVIS W/CONTRAST: CPT | Mod: MC

## 2024-02-29 PROCEDURE — 71260 CT THORAX DX C+: CPT | Mod: MC

## 2024-02-29 PROCEDURE — 99239 HOSP IP/OBS DSCHRG MGMT >30: CPT

## 2024-02-29 PROCEDURE — 87040 BLOOD CULTURE FOR BACTERIA: CPT

## 2024-02-29 PROCEDURE — 83036 HEMOGLOBIN GLYCOSYLATED A1C: CPT

## 2024-02-29 PROCEDURE — 85025 COMPLETE CBC W/AUTO DIFF WBC: CPT

## 2024-02-29 PROCEDURE — 99285 EMERGENCY DEPT VISIT HI MDM: CPT | Mod: 25

## 2024-02-29 PROCEDURE — 87449 NOS EACH ORGANISM AG IA: CPT

## 2024-02-29 PROCEDURE — 88305 TISSUE EXAM BY PATHOLOGIST: CPT | Mod: 26

## 2024-02-29 PROCEDURE — 93005 ELECTROCARDIOGRAM TRACING: CPT

## 2024-02-29 PROCEDURE — 88305 TISSUE EXAM BY PATHOLOGIST: CPT

## 2024-02-29 PROCEDURE — 36415 COLL VENOUS BLD VENIPUNCTURE: CPT

## 2024-02-29 PROCEDURE — 84439 ASSAY OF FREE THYROXINE: CPT

## 2024-02-29 PROCEDURE — 86803 HEPATITIS C AB TEST: CPT

## 2024-02-29 PROCEDURE — 84145 PROCALCITONIN (PCT): CPT

## 2024-02-29 PROCEDURE — 87086 URINE CULTURE/COLONY COUNT: CPT

## 2024-02-29 PROCEDURE — 83605 ASSAY OF LACTIC ACID: CPT

## 2024-02-29 PROCEDURE — 96374 THER/PROPH/DIAG INJ IV PUSH: CPT

## 2024-02-29 PROCEDURE — 0225U NFCT DS DNA&RNA 21 SARSCOV2: CPT

## 2024-02-29 PROCEDURE — 84443 ASSAY THYROID STIM HORMONE: CPT

## 2024-02-29 PROCEDURE — 85027 COMPLETE CBC AUTOMATED: CPT

## 2024-02-29 PROCEDURE — 83690 ASSAY OF LIPASE: CPT

## 2024-02-29 RX ORDER — CEFUROXIME AXETIL 250 MG
1 TABLET ORAL
Qty: 8 | Refills: 0
Start: 2024-02-29 | End: 2024-03-03

## 2024-02-29 RX ORDER — PANTOPRAZOLE SODIUM 20 MG/1
1 TABLET, DELAYED RELEASE ORAL
Refills: 0 | DISCHARGE

## 2024-02-29 RX ORDER — PANTOPRAZOLE SODIUM 20 MG/1
40 TABLET, DELAYED RELEASE ORAL
Refills: 0 | Status: DISCONTINUED | OUTPATIENT
Start: 2024-02-29 | End: 2024-02-29

## 2024-02-29 RX ORDER — FLUTICASONE PROPIONATE 50 MCG
1 SPRAY, SUSPENSION NASAL
Refills: 0 | DISCHARGE

## 2024-02-29 RX ADMIN — AZITHROMYCIN 255 MILLIGRAM(S): 500 TABLET, FILM COATED ORAL at 01:05

## 2024-02-29 RX ADMIN — CEFTRIAXONE 100 MILLIGRAM(S): 500 INJECTION, POWDER, FOR SOLUTION INTRAMUSCULAR; INTRAVENOUS at 01:05

## 2024-02-29 NOTE — PHARMACOTHERAPY INTERVENTION NOTE - COMMENTS
Modified penicillin allergy history to state that patient tolerated ceftriaxone during this admission.    Rayna Soni, PharmD, [facility]  Clinical Pharmacy Specialist, Infectious Diseases  Tele-Antimicrobial Stewardship Program (Tele-ASP)  Tele-ASP Phone: [phone]

## 2024-02-29 NOTE — DISCHARGE NOTE NURSING/CASE MANAGEMENT/SOCIAL WORK - PATIENT PORTAL LINK FT
You can access the FollowMyHealth Patient Portal offered by [facility] by registering at the following website: [email or web address]. By joining [employer]’s FollowMyHealth portal, you will also be able to view your health information using other applications (apps) compatible with our system.

## 2024-02-29 NOTE — PROGRESS NOTE PEDS - ATTENDING COMMENTS
PE: A+Ox3, no murmurs, lungs CTA b/l, abd NT/ND, no lower extremity swelling    Assessment:   - Partial small bowel obstruction   - Aspiration pneumonia vs. community acquired pneumonia   - Elevated AST   - Normocytic anemia   - Axillary and bilateral pelvic adenopathy   - Thyroid nodule (normal TSH)   - Hypoalbuminemia   - History of CLL, GERD     Plan:   - Colonoscopy today: Diverticulosis of the sigmoid colon, normal mucosa in the terminal ileum, grade/Stage I internal hemorrhoids, polyp (4 mm) in the descending colon. (Polypectomy)  - GI recs appreciated - repeat colonoscopy in 5 years, high fiber diet, no NSAIDs for 10 days, f/u pathology results outpatient  - I spoke to the surgery PA and it is recommended pt have a CT enterography outpatient   - Completed 3 days of Azithromycin   - On Ceftriaxone -> change to Ceftin 500 mg BID for 4 more days   - D/C today

## 2024-02-29 NOTE — DISCHARGE NOTE PROVIDER - CARE PROVIDERS DIRECT ADDRESSES
[email or web address]

## 2024-02-29 NOTE — DISCHARGE NOTE PROVIDER - CARE PROVIDER_API CALL
Evaristo Landis  Internal Medicine  [address], [address]  [place] [address]  Phone: [phone]  Fax: [phone]  Follow Up Time:     Selvin Soto  Medical Oncology  [address] [place] [address]  Phone: [phone]  Fax: [phone]  Follow Up Time:     Cuate Samer  Surgery  [address], [address]  [place] [address]  Phone: [phone]  Fax: [phone]  Follow Up Time:     Carson Mcneil  Gastroenterology  [address], [address]  [place] [address]  Phone: [phone]  Fax: [phone]  Follow Up Time:

## 2024-02-29 NOTE — PHARMACOTHERAPY INTERVENTION NOTE - NSPHARMCOMMMEDSAFE
Allergy reviewed/ verified - Prescriber aware; Therapy continued
Age-related (geriatrics) therapy recommendation

## 2024-02-29 NOTE — PROGRESS NOTE ADULT - ASSESSMENT
68 year old female with hx of  CLL presented with SBO - now resolved - started on regular diet s/p normal colonoscopy     Plan:    Discussed with Dr Cuello and patient              Patient to follow up with Dr Cuello in one week              Patient to have CT enterography as OP              Instructions given to patient              Discharge tonight as per medical team if patient tolerates regular diet 
69 yo female with history of GERD, HLD, recently diagnosed with CLL as well as fe def anemia, who came to ER on 2/26 after she developed n/v and abdominal pain .  She has not required treatment for CLL, it is a new diagnosis.  She was evaluated for anemia, it is Fe deficient, she has received Fe infusions.  She had OPD EGD and was scheduled for colonoscopy later in the month.  She reports a dry cough x months, CT scan with some patchy infiltrates along with tree in bud regions.  No fever, chest pain, or sputum production, chest findings almost seem incidental to what brought her to ER.  Urine for legionella antigen is negative as is RVP, urine and blood cultures are negative at 24 hours.  suggest:  1. Limit azithro to 1500 mg, 3 doses  2 Await colonoscopy, can switch to ceftin 500 BID in 24 hours to complete a 5-7 day course of antibiotics  3 No clinical signs of active infection, however if cough does not resolve she should have f/u, ? if she has reflux or secondary cause to chronic cough.  4. Ct findings of distal small bowel obstruction would argue against "food poisoning", which is typically related to food containing preformed toxins and should not have radiographic abnormalities as above.
Patient is a 68y old  Female who presents with a chief complaint of Abdominal pain, nausea, vomiting (27 Feb 2024 01:02)  surgery following for partial SBO  pt currently feels better, denies n/v, states abd pain has improved     pt much improved clinically    start clear diet    will follow
68-year-old female past medical history of CLL presenting for epigastric pain, nausea, and vomiting. Patient reports experiencing abdominal pain, nausea and vomiting after eating an egg salad sandwich earlier today. To alleviate her symptoms, pt took zofran at home with no improvement. Reports having about 3-4 episodes of nonbloody non bilious emesis and about 4 non-watery, non bloody formed stools. s/p 2U PRBCs, 2 IV iron infusions and endoscopy within the past two weeks. Admitted to medicine for further evaluation.       #Small Bowel Obstruction  #Nausea/Vomitting  #Epigastric Abdominal pain  -Admitted to medicine  -s/p 1L IVFs, morphine x1 and Ofirmev x1 in ED  -CT A/P: +SBO   -Surgery Consult: NPO, Advance diet as tolerated, IVFs, No NGT for now  -Lipase level wnl  -IV Zofran for nausea/vomiting  -clear liquid diet    #Lung Consolidation on RML and lingula  #Leukocytosis in the setting of CLL   -Pt met  2/4 SIRS criteria with tachycardia and Leukocytosis  -History of CLL and leukocytosis, WBC of 19 on admission. Baseline WBC 12-14 in early February  -EKG: Sinus tachycardia at 108  -CT Chest: + Lung consolidation of RML and lingula  -CT A/P: Air in bladder, suggesting infection   -CTX and Azithromycin   -RVP Panel neg  -F/u BCx x2 and UCx  -Trend CBC  -ID consult    #Elevated Glucose  -A1C 5% wnl  -Glucose 118  -clear liquid diet    #Elevated Liver function test  -Trend LFTs  -Hepatitis C neg    #Thyroid nodule  -CT Chest: 12 mm thyroid nodule  -AM TSH level  -outpatient follow-up    #B/L Pelvic Adenopathy  -outpatient follow-up    #DVT ppx  -SubQ Heparin    #Full Code    Patient and  updated at bedside regarding plan of care.      Discussed with Dr. Sandoval  
68-year-old female past medical history of CLL presenting for epigastric pain, nausea, and vomiting. Patient reports experiencing abdominal pain, nausea and vomiting after eating an egg salad sandwich earlier today. To alleviate her symptoms, pt took zofran at home with no improvement. Reports having about 3-4 episodes of nonbloody non bilious emesis and about 4 non-watery, non bloody formed stools. s/p 2U PRBCs, 2 IV iron infusions and endoscopy within the past two weeks. Admitted to medicine for further evaluation.       #Small Bowel Obstruction- r/o mass  -Stable   -Tolerating clear liquids   -Nausea and vomiting improved   -CT A/P: +SBO   -Surgery Consult appreciated   -GI colonoscopy tomorrow - To rule out Colon mass   -Lipase level wnl  -IV Zofran for nausea/vomiting  -c/w clear liquid diet  -Npo from mid night     #Lung Consolidation on RML and lingula- Probable Aspiration Pneumonia vs Community Pneumonia  #Leukocytosis in the setting of CLL   -CT Chest: + Lung consolidation of RML and lingula  -CT A/P: Air in bladder, suggesting infection   -C/W CTX and Azithromycin   -RVP Panel neg  -BCx x2 and UCx- Neg   -ID consult appreciated    #Elevated Liver function test  -Trend LFTs  -Hepatitis C neg    #Thyroid nodule  -CT Chest: 12 mm thyroid nodule  -AM TSH level  -outpatient follow-up    # B/L Pelvic Adenopathy  -GI following     #DVT ppx  -SubQ Heparin    #Full Code    Patient and  updated at bedside regarding plan of care.      Discussed with Dr. Sandoval  
68-year-old female past medical history of CLL presenting for epigastric pain, nausea, and vomiting. Patient reports experiencing abdominal pain, nausea and vomiting after eating an egg salad sandwich earlier today. Reports dyspnea at that time. She was then given an IV iron infusion for suspected iron deficiency anemia. She underwent an endoscopy with a second blood transfusion since Hgb was about 8 at time of procedure. Last IV iron infusion was on Thursday. Pt followed by surgery for PSBO. 
68-year-old female past medical history of CLL presenting for epigastric pain, nausea, and vomiting. Patient reports experiencing abdominal pain, nausea and vomiting after eating an egg salad sandwich earlier today. To alleviate her symptoms, pt took zofran at home with no improvement. Reports having about 3-4 episodes of nonbloody non bilious emesis and about 4 non-watery, non bloody formed stools. s/p 2U PRBCs, 2 IV iron infusions and endoscopy within the past two weeks. Admitted to medicine for further evaluation.       #Partial small Bowel Obstruction  -Stable   -Tolerating clear liquids   -Nausea and vomiting improved   -CT A/P: +SBO   -Surgery Consult appreciated: no intervention at this time  -GI consult appreciated: colonoscopy to rule out Colon mass   -Lipase level wnl  -IV Zofran for nausea/vomiting  -Npo for Colonoscopy today     #Lung Consolidation on RML and lingula- Probable Aspiration Pneumonia vs Community Pneumonia  #Leukocytosis in the setting of CLL   -CT Chest: + Lung consolidation of RML and lingula  -CT A/P: Air in bladder, suggesting infection   -C/W CTX   -s/p Azithromycin 3day course  -RVP Panel neg  -BCx x2 and UCx- Neg   -ID consult appreciated    #Elevated Liver function test  -Trend LFTs  -Hepatitis C neg    #Thyroid nodule  -CT Chest: 12 mm thyroid nodule  -AM TSH level  -outpatient follow-up    # B/L Pelvic Adenopathy  -GI following     #DVT ppx  -SubQ Heparin    #Full Code    Dispo: possible d/c pending colonoscopy results.      Patient updated about plan of care with FM  team at bedside.      Discussed with Dr. Segura

## 2024-02-29 NOTE — PROGRESS NOTE PEDS - ASSESSMENT
68-year-old female past medical history of CLL presenting for epigastric pain, nausea, and vomiting. Patient reports experiencing abdominal pain, nausea and vomiting after eating an egg salad sandwich earlier today. To alleviate her symptoms, pt took zofran at home with no improvement. Reports having about 3-4 episodes of nonbloody non bilious emesis and about 4 non-watery, non bloody formed stools. s/p 2U PRBCs, 2 IV iron infusions and endoscopy within the past two weeks. Admitted to medicine for further evaluation.       #Small Bowel Obstruction- r/o mass  -Stable   -Tolerating clear liquids   -Nausea and vomiting improved   -CT A/P: +SBO   -Surgery Consult appreciated: no intervention at this time  -GI consult appreciated: colonoscopy to rule out Colon mass   -Lipase level wnl  -IV Zofran for nausea/vomiting  -Npo for Colonoscopy today     #Lung Consolidation on RML and lingula- Probable Aspiration Pneumonia vs Community Pneumonia  #Leukocytosis in the setting of CLL   -CT Chest: + Lung consolidation of RML and lingula  -CT A/P: Air in bladder, suggesting infection   -C/W CTX   -s/p Azithromycin 3day course  -RVP Panel neg  -BCx x2 and UCx- Neg   -ID consult appreciated    #Elevated Liver function test  -Trend LFTs  -Hepatitis C neg    #Thyroid nodule  -CT Chest: 12 mm thyroid nodule  -AM TSH level  -outpatient follow-up    # B/L Pelvic Adenopathy  -GI following     #DVT ppx  -SubQ Heparin    #Full Code    Dispo: possible d/c pending colonoscopy results.      Patient updated about plan of care with FM  team at bedside.      Discussed with Dr. Segura   68-year-old female past medical history of CLL presenting for epigastric pain, nausea, and vomiting. Patient reports experiencing abdominal pain, nausea and vomiting after eating an egg salad sandwich earlier today. To alleviate her symptoms, pt took zofran at home with no improvement. Reports having about 3-4 episodes of nonbloody non bilious emesis and about 4 non-watery, non bloody formed stools. s/p 2U PRBCs, 2 IV iron infusions and endoscopy within the past two weeks. Admitted to medicine for further evaluation.       #Partial small Bowel Obstruction  -Stable   -Tolerating clear liquids   -Nausea and vomiting improved   -CT A/P: +SBO   -Surgery Consult appreciated: no intervention at this time  -GI consult appreciated: colonoscopy to rule out Colon mass   -Lipase level wnl  -IV Zofran for nausea/vomiting  -Npo for Colonoscopy today     #Lung Consolidation on RML and lingula- Probable Aspiration Pneumonia vs Community Pneumonia  #Leukocytosis in the setting of CLL   -CT Chest: + Lung consolidation of RML and lingula  -CT A/P: Air in bladder, suggesting infection   -C/W CTX   -s/p Azithromycin 3day course  -RVP Panel neg  -BCx x2 and UCx- Neg   -ID consult appreciated    #Elevated Liver function test  -Trend LFTs  -Hepatitis C neg    #Thyroid nodule  -CT Chest: 12 mm thyroid nodule  -AM TSH level  -outpatient follow-up    # B/L Pelvic Adenopathy  -GI following     #DVT ppx  -SubQ Heparin    #Full Code    Dispo: possible d/c pending colonoscopy results.      Patient updated about plan of care with FM  team at bedside.      Discussed with Dr. Segura

## 2024-02-29 NOTE — DISCHARGE NOTE PROVIDER - ATTENDING DISCHARGE PHYSICAL EXAMINATION:
A+Ox3, no murmurs, lungs CTA b/l, abd NT/ND, no lower extremity swelling, right antecubital thrombophlebitis

## 2024-02-29 NOTE — DISCHARGE NOTE NURSING/CASE MANAGEMENT/SOCIAL WORK - NSPROEXTENSIONSOFSELF_GEN_A_NUR
Bed: G17  Expected date: 3/20/23  Expected time: 8:37 AM  Means of arrival:   Comments:  1st response  
Patient complains of left-sided chest pain radiating to her neck.  She is 3 weeks status post laparoscopic hernia repair.     Ismael Almaguer MD  03/20/23 [id number]    
glasses, phone, t-shift, jacket/eyeglasses

## 2024-02-29 NOTE — DISCHARGE NOTE PROVIDER - NSDCFUSCHEDAPPT_GEN_ALL_CORE_FT
[facility]  [facility]  Scheduled Appointment: 03/18/2024    Selvin Soto  [facility]  Scheduled Appointment: 03/18/2024    Evaristo Landis  [facility]  INTMED [address]  Scheduled Appointment: 03/22/2024    Ariel Martinez  [facility]  [facility]ALM [address]  Scheduled Appointment: 05/22/2024

## 2024-02-29 NOTE — PROGRESS NOTE ADULT - ATTENDING COMMENTS
PE: A+Ox3, no murmurs, lungs CTA b/l, abd NT/ND, no lower extremity swelling    Assessment:   - Partial small bowel obstruction   - Aspiration pneumonia vs. community acquired pneumonia   - Elevated AST   - Normocytic anemia   - Axillary and bilateral pelvic adenopathy   - Thyroid nodule (normal TSH)   - Hypoalbuminemia   - History of CLL, GERD     Plan:   - Colonoscopy today: Diverticulosis of the sigmoid colon, normal mucosa in the terminal ileum, grade/Stage I internal hemorrhoids, polyp (4 mm) in the descending colon. (Polypectomy)  - GI recs appreciated - repeat colonoscopy in 5 years, high fiber diet, no NSAIDs for 10 days, f/u pathology results outpatient  - I spoke to the surgery PA and it is recommended pt have a CT enterography outpatient   - Completed 3 days of Azithromycin   - On Ceftriaxone -> change to Ceftin 500 mg BID for 4 more days   - D/C today
68-year-old female past medical history of CLL presenting for epigastric pain, nausea, and vomiting. Patient reports experiencing abdominal pain, nausea and vomiting after eating an egg salad sandwich earlier today. To alleviate her symptoms, pt took zofran at home with no improvement. Reports having about 3-4 episodes of nonbloody non bilious emesis and about 4 non-watery, non bloody formed stools. s/p 2U PRBCs, 2 IV iron infusions and endoscopy within the past two weeks. Admitted to medicine for further evaluation. PLan: apprec ID recs, monitor clinical course, apprec surgery recs, aprec GI recs, monitor i/os, NPO MNO< colonoscopy tomorrow, cont antibx as per GI, update outpt providers on dc make aware of course and follow up plans
68-year-old female past medical history of CLL presenting for epigastric pain, nausea, and vomiting. Patient reports experiencing abdominal pain, nausea and vomiting after eating an egg salad sandwich earlier today. To alleviate her symptoms, pt took zofran at home with no improvement. Reports having about 3-4 episodes of nonbloody non bilious emesis and about 4 non-watery, non bloody formed stools. s/p 2U PRBCs, 2 IV iron infusions and endoscopy within the past two weeks. Admitted to medicine for further evaluation. PLan: apprec ID recs, monitor clinical course, apprec surgery recs, will ask GI to see tomorrow, if not improving consider hemat/onco, monitor i/os,

## 2024-02-29 NOTE — DISCHARGE NOTE PROVIDER - HOSPITAL COURSE
67y/o F with PMHx CLL, GERD, HLD and s/p recent transfusion 2u pRBC and IV iron transfusion presented to [place] ED for epigastric pain, n/v x 1day. In the ED, Pt was tachycardic with WBC 19.43, Glu 181. On imaging CT a/p found +SBO, b/l pelvic adenopathy and CT chest +consolidation on RML and lingula, 12mm thyroid nodule, b/l axillary adenopathy. Given morphine x1 and Ofirmev x1 for pain, Zofran x1 for nausea/vomiting and 1L IVF bolus. Admitted to medicine for further evaluation of SBO.        Consults: Surgery, Gastroenterology, Infectious disease        Imaging:    CT Chest, Abdomen and Pelvis w/ IV Cont (02.26.24 @ 22:05)     IMPRESSION:  Patchy groundglass/consolidation right middle and left lingular lobes is   suggestive of infection/pneumonia.  Bilateral micronodular, tree-in-bud opacities are suggestive of an   laboratory/infectious airway disease.    Bilateral axillary lymphadenopathy.    12 mm nodule right lobe of the thyroid gland.  Correlate clinically.  Recommend further evaluation with thyroid ultrasonography as indicated.    Distal small bowel obstruction with transition the left lower quadrant as   discussed.    Bilateral pelvic lymphadenopathy as discussed.    Intraluminal air urinary bladder, correlate clinically for recent   instrumentation.    Other findings as discussed above.      Colonoscopy  Impressions:    Diverticulosis of the sigmoid colon.  Normal mucosa in the terminal ileum.  Grade/Stage I internal hemorrhoids.  Polyp (4 mm) in the descending colon. (Polypectomy).      Plan:  Colonoscopy in 5 years, (colon polyps)  High Fiber Diet  Avoid NSAIDs for 10 days  Return to floor for further management  Await pathology results  Continue current medical regimen.       69y/o F with PMHx CLL, GERD, HLD and s/p recent transfusion 2u pRBC and IV iron transfusion presented to [place] ED for epigastric pain, n/v x 1day. In the ED, Pt was tachycardic with WBC 19.43, Glu 181. On imaging CT a/p found +SBO, b/l pelvic adenopathy and CT chest +consolidation on RML and lingula, 12mm thyroid nodule, b/l axillary adenopathy. Given morphine x1 and Ofirmev x1 for pain, Zofran x1 for nausea/vomiting and 1L IVF bolus. Admitted to medicine for further evaluation of SBO.  Surgery evaluated the patient and recommended no surgical intervention with start of liquid diet.       Consults: Surgery, Gastroenterology, Infectious disease        Imaging:    CT Chest, Abdomen and Pelvis w/ IV Cont (02.26.24 @ 22:05)     IMPRESSION:  Patchy groundglass/consolidation right middle and left lingular lobes is   suggestive of infection/pneumonia.  Bilateral micronodular, tree-in-bud opacities are suggestive of an   laboratory/infectious airway disease.    Bilateral axillary lymphadenopathy.    12 mm nodule right lobe of the thyroid gland.  Correlate clinically.  Recommend further evaluation with thyroid ultrasonography as indicated.    Distal small bowel obstruction with transition the left lower quadrant as   discussed.    Bilateral pelvic lymphadenopathy as discussed.    Intraluminal air urinary bladder, correlate clinically for recent   instrumentation.    Other findings as discussed above.      Colonoscopy  Impressions:    Diverticulosis of the sigmoid colon.  Normal mucosa in the terminal ileum.  Grade/Stage I internal hemorrhoids.  Polyp (4 mm) in the descending colon. (Polypectomy).      Plan:  Colonoscopy in 5 years, (colon polyps)  High Fiber Diet  Avoid NSAIDs for 10 days  Return to floor for further management  Await pathology results  Continue current medical regimen.       67y/o F with PMHx CLL, GERD, HLD and s/p recent transfusion 2u pRBC and IV iron transfusion presented to [place] ED for epigastric pain, n/v x 1day. In the ED, Pt was tachycardic with WBC 19.43, Glu 181. On imaging CT a/p found +SBO, b/l pelvic adenopathy and CT chest +consolidation on RML and lingula, 12mm thyroid nodule, b/l axillary adenopathy. Given morphine x1 and Ofirmev x1 for pain, Zofran x1 for nausea/vomiting and 1L IVF bolus. Admitted to medicine for further evaluation of SBO.  Surgery evaluated the patient and recommended no surgical intervention was needed at the time. Pt started liquid diet with good tolerance. ID evaluated the Pt and recommended continue Azithromax x3 days and Ceftriaxone for aspiration pneumonia; Pt can be discharged on ceftin 500mg bid to complete a 7 day course. GI recommended colonoscopy for today, which showed 1 polyp, internal hemorrhoid and sigmoidal diverticulosis. Also, GI recommended outpatient CT enterography w/wo small bowel capsule. On imaging, Pt found to have several lymphadenopathy, which should be f/u with PCP or HemeOnc. If patient is able to tolerate dinner, she can be discharged home.       Pt is medically stable and ready for discharge home with f/u with PCP, GI, Surgery and HemeOnc outpatient.    Consults: Surgery, Gastroenterology, Infectious disease    Vital Signs Last 24 Hrs  T(C): 36.6 (29 Feb 2024 11:56), Max: 36.6 (29 Feb 2024 11:56)  T(F): 97.9 (29 Feb 2024 11:56), Max: 97.9 (29 Feb 2024 11:56)  HR: 81 (29 Feb 2024 11:56) (80 - 85)  BP: 134/81 (29 Feb 2024 11:56) (121/73 - 139/82)  BP(mean): --  RR: 16 (29 Feb 2024 11:56) (16 - 16)  SpO2: 96% (29 Feb 2024 11:56) (95% - 96%)    Parameters below as of 29 Feb 2024 11:56  Patient On (Oxygen Delivery Method): room air    PHYSICAL EXAM  Constitutional: Pt lying in bed, awake and alert, NAD  HEENT: EOMI, normocephalic, moist mucous membranes  Respiratory: CTABL, No wheezing, rales or rhonchi  Cardiovascular: S1S2+, RRR, no M/G/R  Gastrointestinal: BS+, soft, +mild diffuse tenderness mostly in epigastric region, +mildly distended  Extremities: No calf pain or edema  Vascular: Peripheral pulses present  Neurological: AAOx3, no focal deficits  Musculoskeletal: Normal muscle tone, no atrophy, no rigidity, no contractions  Skin: +warm, erythematous Rt antecubittal fossa from prior IV site, No significant new skin lesions or rashes      Imaging:    CT Chest, Abdomen and Pelvis w/ IV Cont (02.26.24 @ 22:05)     IMPRESSION:  Patchy groundglass/consolidation right middle and left lingular lobes is   suggestive of infection/pneumonia.  Bilateral micronodular, tree-in-bud opacities are suggestive of an   laboratory/infectious airway disease.    Bilateral axillary lymphadenopathy.    12 mm nodule right lobe of the thyroid gland.  Correlate clinically.  Recommend further evaluation with thyroid ultrasonography as indicated.    Distal small bowel obstruction with transition the left lower quadrant as   discussed.    Bilateral pelvic lymphadenopathy as discussed.    Intraluminal air urinary bladder, correlate clinically for recent   instrumentation.    Other findings as discussed above.      Colonoscopy  Impressions:    Diverticulosis of the sigmoid colon.  Normal mucosa in the terminal ileum.  Grade/Stage I internal hemorrhoids.  Polyp (4 mm) in the descending colon. (Polypectomy).      Plan:  Colonoscopy in 5 years, (colon polyps)  High Fiber Diet  Avoid NSAIDs for 10 days  Return to floor for further management  Await pathology results  Continue current medical regimen.

## 2024-02-29 NOTE — CHART NOTE - NSCHARTNOTEFT_GEN_A_CORE
Brief Nutrition Note: pt at colonoscopy at time of visit. Brief interview conducted with pt's daughter at bedside who reports that pt has been nutritionally stable PTA, tolerating clear liquid diet at present. Typically has good appetite, avoids acidic/spicy foods in setting of GERD. Noted pt was educated by RD yesterday on nutrition management for iron deficiency anemia +written materials provided. No nutrition related questions/concerns at this time. Menu provided and ordering procedures reviewed. RD to remain available/follow up per protocol. Jennifer Rodrigez RDN [phone] or TEAMS.

## 2024-02-29 NOTE — PROGRESS NOTE PEDS - SUBJECTIVE AND OBJECTIVE BOX
Patient is a 68y old  Female PMH GERD, HLD, CLL who presents with a chief complaint of Abdominal pain, nausea, vomiting (27 Feb 2024 10:13)      Subjective: Patient was seen and examined this morning at bedside. Pt reports feeling well and walking with mild abdominal discomfort. Pt voiding, passing flatus and had bowel movements due to bowel prep. Pt going for colonoscopy this afternoon.   Overnight events: none    REVIEW OF SYSTEMS:  CONSTITUTIONAL: No weakness, fevers or chills  EYES/ENT: No visual changes;  No vertigo or throat pain   NECK: No pain or stiffness  RESPIRATORY: No cough, wheezing, or hemoptysis; No shortness of breath  CARDIOVASCULAR: No chest pain or palpitations  GASTROINTESTINAL: No abdominal or epigastric pain. No nausea, vomiting; No diarrhea or constipation.   GENITOURINARY: No dysuria, frequency or hematuria  NEUROLOGICAL: No numbness or weakness  SKIN: No itching, burning, rashes, or lesions       Vital Signs Last 24 Hrs  T(C): 36.6 (29 Feb 2024 11:56), Max: 36.6 (29 Feb 2024 11:56)  T(F): 97.9 (29 Feb 2024 11:56), Max: 97.9 (29 Feb 2024 11:56)  HR: 81 (29 Feb 2024 11:56) (80 - 85)  BP: 134/81 (29 Feb 2024 11:56) (121/73 - 139/82)  BP(mean): --  RR: 16 (29 Feb 2024 11:56) (16 - 16)  SpO2: 96% (29 Feb 2024 11:56) (95% - 96%)    Parameters below as of 29 Feb 2024 11:56  Patient On (Oxygen Delivery Method): room air      PHYSICAL EXAM  Constitutional: Pt sitting in chair, awake and alert, NAD  HEENT: EOMI, normocephalic, moist mucous membranes  Respiratory: CTABL, No wheezing, rales or rhonchi  Cardiovascular: S1S2+, RRR, no M/G/R  Gastrointestinal: BS+, soft, mild diffuse tenderness, mildly distended  Extremities: No calf pain or edema  Vascular: Peripheral pulses present  Neurological: AAOx3, no focal deficits  Musculoskeletal: Normal muscle tone, no atrophy, no rigidity, no contractions  Skin: No significant new skin lesions or rashes    MEDICATIONS:  MEDICATIONS  (STANDING):  atorvastatin 20 milliGRAM(s) Oral at bedtime  cefTRIAXone   IVPB 1000 milliGRAM(s) IV Intermittent every 24 hours  folic acid 1 milliGRAM(s) Oral daily  influenza  Vaccine (HIGH DOSE) 0.7 milliLiter(s) IntraMuscular once    MEDICATIONS  (PRN):  acetaminophen     Tablet .. 650 milliGRAM(s) Oral every 6 hours PRN Temp greater or equal to 38C (100.4F), Mild Pain (1 - 3)  aluminum hydroxide/magnesium hydroxide/simethicone Suspension 30 milliLiter(s) Oral every 4 hours PRN Dyspepsia  fluticasone propionate 50 MICROgram(s)/spray Nasal Spray 1 Spray(s) Both Nostrils two times a day PRN nasal irritation  melatonin 3 milliGRAM(s) Oral at bedtime PRN Insomnia  ondansetron Injectable 4 milliGRAM(s) IV Push every 8 hours PRN Nausea and/or Vomiting      LABS: All Labs Reviewed:                         10.2   8.79  )-----------( 371      ( 29 Feb 2024 05:30 )             34.2     02-29    147<H>  |  110<H>  |  10  ----------------------------<  82  4.1   |  25  |  0.74    Ca    9.3      29 Feb 2024 05:30    TPro  6.8  /  Alb  3.2<L>  /  TBili  0.4  /  DBili  x   /  AST  31  /  ALT  64<H>  /  AlkPhos  81  02-29    LIVER FUNCTIONS - ( 29 Feb 2024 05:30 )  Alb: 3.2 g/dL / Pro: 6.8 g/dL / ALK PHOS: 81 U/L / ALT: 64 U/L / AST: 31 U/L / GGT: x               RADIOLOGY/EKG:  CT Chest, Abdomen and Pelvis w/ IV Cont (02.26.24 @ 22:05)     IMPRESSION:    Patchy groundglass/consolidation right middle and left lingular lobes is   suggestive of infection/pneumonia.  Bilateral micronodular, tree-in-bud opacities are suggestive of an   laboratory/infectious airway disease.    Bilateral axillary lymphadenopathy.    12 mm nodule right lobe of the thyroid gland.  Correlate clinically.  Recommend further evaluation with thyroid ultrasonography as indicated.    Distal small bowel obstruction with transition the left lower quadrant as   discussed.    Bilateral pelvic lymphadenopathy as discussed.    Intraluminal air urinary bladder, correlate clinically for recent   instrumentation.

## 2024-02-29 NOTE — PROGRESS NOTE ADULT - REASON FOR ADMISSION
Abdominal pain, nausea, vomiting

## 2024-02-29 NOTE — DISCHARGE NOTE PROVIDER - PROVIDER TOKENS
PROVIDER:[TOKEN:[[id number]]],PROVIDER:[TOKEN:[[id number]]],PROVIDER:[TOKEN:[[id number]]],PROVIDER:[TOKEN:[[id number]]]

## 2024-02-29 NOTE — DISCHARGE NOTE PROVIDER - NSDCMRMEDTOKEN_GEN_ALL_CORE_FT
Crestor 5 mg oral tablet: 1 tab(s) orally once a day  Flonase Allergy Relief 50 mcg/inh nasal spray: 1 spray(s) in each nostril once a day as needed for  congestion  folic acid 1 mg oral tablet: 1 tab(s) orally once a day  Protonix 40 mg oral delayed release tablet: 1 tab(s) orally once a day   Crestor 5 mg oral tablet: 1 tab(s) orally once a day  CT enterography: do not eat or drink for 6 hours prior to test  CT Enterography: do not eat or drink for 6 hours prior to test  Flonase Allergy Relief 50 mcg/inh nasal spray: 1 spray(s) in each nostril once a day as needed for  congestion  folic acid 1 mg oral tablet: 1 tab(s) orally once a day  Protonix 40 mg oral delayed release tablet: 1 tab(s) orally once a day   cefuroxime 500 mg oral tablet: 1 tab(s) orally 2 times a day  Crestor 5 mg oral tablet: 1 tab(s) orally once a day  CT enterography: do not eat or drink for 6 hours prior to test  Flonase Allergy Relief 50 mcg/inh nasal spray: 1 spray(s) in each nostril once a day as needed for  congestion  folic acid 1 mg oral tablet: 1 tab(s) orally once a day  Protonix 40 mg oral delayed release tablet: 1 tab(s) orally once a day

## 2024-02-29 NOTE — PROGRESS NOTE ADULT - SUBJECTIVE AND OBJECTIVE BOX
Patient is a 68y old  Female PMH GERD, HLD, CLL who presents with a chief complaint of Abdominal pain, nausea, vomiting (27 Feb 2024 10:13)      Subjective: Patient was seen and examined this morning at bedside. Pt reports feeling ok with mild intermittent abdominal pain. Pt voiding and passing flatus. Denies any bowel movement.   Overnight events: none    REVIEW OF SYSTEMS:  CONSTITUTIONAL: No weakness, fevers or chills  EYES/ENT: No visual changes;  No vertigo or throat pain   NECK: No pain or stiffness  RESPIRATORY: No cough, wheezing, or hemoptysis; No shortness of breath  CARDIOVASCULAR: No chest pain or palpitations  GASTROINTESTINAL: No abdominal or epigastric pain. No nausea, vomiting; No diarrhea or constipation.   GENITOURINARY: No dysuria, frequency or hematuria  NEUROLOGICAL: No numbness or weakness  SKIN: No itching, burning, rashes, or lesions       Vital Signs Last 24 Hrs  T(C): 36.6 (27 Feb 2024 12:04), Max: 37.1 (26 Feb 2024 20:49)  T(F): 97.8 (27 Feb 2024 12:04), Max: 98.8 (26 Feb 2024 20:49)  HR: 82 (27 Feb 2024 12:04) (82 - 112)  BP: 115/71 (27 Feb 2024 12:04) (100/69 - 143/83)  BP(mean): --  RR: 16 (27 Feb 2024 12:04) (16 - 18)  SpO2: 97% (27 Feb 2024 12:04) (93% - 97%)    Parameters below as of 27 Feb 2024 12:04  Patient On (Oxygen Delivery Method): room air        PHYSICAL EXAM  Constitutional: Pt lying in bed, awake and alert, NAD  HEENT: EOMI, normocephalic, moist mucous membranes  Respiratory: CTABL, No wheezing, rales or rhonchi  Cardiovascular: S1S2+, RRR, no M/G/R  Gastrointestinal: BS+, soft, mild diffuse tenderness mostly in epigastric region, mildly distended  Extremities: No calf pain or edema  Vascular: Peripheral pulses present  Neurological: AAOx3, no focal deficits  Musculoskeletal: Normal muscle tone, no atrophy, no rigidity, no contractions  Skin: No significant new skin lesions or rashes    MEDICATIONS:  MEDICATIONS  (STANDING):  atorvastatin 20 milliGRAM(s) Oral at bedtime  folic acid 1 milliGRAM(s) Oral daily  heparin   Injectable 5000 Unit(s) SubCutaneous every 12 hours  influenza  Vaccine (HIGH DOSE) 0.7 milliLiter(s) IntraMuscular once  sodium chloride 0.9%. 1000 milliLiter(s) (100 mL/Hr) IV Continuous <Continuous>    MEDICATIONS  (PRN):  acetaminophen     Tablet .. 650 milliGRAM(s) Oral every 6 hours PRN Temp greater or equal to 38C (100.4F), Mild Pain (1 - 3)  aluminum hydroxide/magnesium hydroxide/simethicone Suspension 30 milliLiter(s) Oral every 4 hours PRN Dyspepsia  melatonin 3 milliGRAM(s) Oral at bedtime PRN Insomnia  morphine  - Injectable 2 milliGRAM(s) IV Push every 4 hours PRN Severe Pain (7 - 10)  ondansetron Injectable 4 milliGRAM(s) IV Push every 8 hours PRN Nausea and/or Vomiting      LABS: All Labs Reviewed:                        10.6   14.42 )-----------( 368      ( 27 Feb 2024 06:09 )             34.9     02-27    142  |  105  |  20  ----------------------------<  118<H>  4.2   |  25  |  1.11    Ca    9.1      27 Feb 2024 06:09    TPro  7.1  /  Alb  3.3  /  TBili  0.6  /  DBili  x   /  AST  27  /  ALT  42  /  AlkPhos  87  02-27        RADIOLOGY/EKG:  CT Chest, Abdomen and Pelvis w/ IV Cont (02.26.24 @ 22:05)     IMPRESSION:    Patchy groundglass/consolidation right middle and left lingular lobes is   suggestive of infection/pneumonia.  Bilateral micronodular, tree-in-bud opacities are suggestive of an   laboratory/infectious airway disease.    Bilateral axillary lymphadenopathy.    12 mm nodule right lobe of the thyroid gland.  Correlate clinically.  Recommend further evaluation with thyroid ultrasonography as indicated.    Distal small bowel obstruction with transition the left lower quadrant as   discussed.    Bilateral pelvic lymphadenopathy as discussed.    Intraluminal air urinary bladder, correlate clinically for recent   instrumentation.    
Patient is a 68y old  Female PMH GERD, HLD, CLL who presents with a chief complaint of Abdominal pain, nausea, vomiting (27 Feb 2024 10:13)      Subjective: Patient was seen and examined this morning at bedside. Pt reports feeling ok with mild intermittent abdominal pain. Pt voiding, passing flatus and tolerating clear liquids . Denies any bowel movement.   Overnight events: none    REVIEW OF SYSTEMS:  CONSTITUTIONAL: No weakness, fevers or chills  EYES/ENT: No visual changes;  No vertigo or throat pain   NECK: No pain or stiffness  RESPIRATORY: No cough, wheezing, or hemoptysis; No shortness of breath  CARDIOVASCULAR: No chest pain or palpitations  GASTROINTESTINAL: No abdominal or epigastric pain. No nausea, vomiting; No diarrhea or constipation.   GENITOURINARY: No dysuria, frequency or hematuria  NEUROLOGICAL: No numbness or weakness  SKIN: No itching, burning, rashes, or lesions       Vital Signs Last 24 Hrs  T(C): 36.6 (27 Feb 2024 12:04), Max: 37.1 (26 Feb 2024 20:49)  T(F): 97.8 (27 Feb 2024 12:04), Max: 98.8 (26 Feb 2024 20:49)  HR: 82 (27 Feb 2024 12:04) (82 - 112)  BP: 115/71 (27 Feb 2024 12:04) (100/69 - 143/83)  BP(mean): --  RR: 16 (27 Feb 2024 12:04) (16 - 18)  SpO2: 97% (27 Feb 2024 12:04) (93% - 97%)    Parameters below as of 27 Feb 2024 12:04  Patient On (Oxygen Delivery Method): room air        PHYSICAL EXAM  Constitutional: Pt lying in bed, awake and alert, NAD  HEENT: EOMI, normocephalic, moist mucous membranes  Respiratory: CTABL, No wheezing, rales or rhonchi  Cardiovascular: S1S2+, RRR, no M/G/R  Gastrointestinal: BS+, soft, mild diffuse tenderness mostly in epigastric region, mildly distended  Extremities: No calf pain or edema  Vascular: Peripheral pulses present  Neurological: AAOx3, no focal deficits  Musculoskeletal: Normal muscle tone, no atrophy, no rigidity, no contractions  Skin: No significant new skin lesions or rashes    MEDICATIONS:  MEDICATIONS  (STANDING):  atorvastatin 20 milliGRAM(s) Oral at bedtime  folic acid 1 milliGRAM(s) Oral daily  heparin   Injectable 5000 Unit(s) SubCutaneous every 12 hours  influenza  Vaccine (HIGH DOSE) 0.7 milliLiter(s) IntraMuscular once  sodium chloride 0.9%. 1000 milliLiter(s) (100 mL/Hr) IV Continuous <Continuous>    MEDICATIONS  (PRN):  acetaminophen     Tablet .. 650 milliGRAM(s) Oral every 6 hours PRN Temp greater or equal to 38C (100.4F), Mild Pain (1 - 3)  aluminum hydroxide/magnesium hydroxide/simethicone Suspension 30 milliLiter(s) Oral every 4 hours PRN Dyspepsia  melatonin 3 milliGRAM(s) Oral at bedtime PRN Insomnia  morphine  - Injectable 2 milliGRAM(s) IV Push every 4 hours PRN Severe Pain (7 - 10)  ondansetron Injectable 4 milliGRAM(s) IV Push every 8 hours PRN Nausea and/or Vomiting      LABS: All Labs Reviewed:                                   10.2   9.95  )-----------( 360      ( 28 Feb 2024 09:00 )             34.3     02-28    145  |  110<H>  |  12  ----------------------------<  89  4.7   |  22  |  0.82    Ca    9.1      28 Feb 2024 06:17    TPro  7.0  /  Alb  3.2<L>  /  TBili  0.4  /  DBili  x   /  AST  61<H>  /  ALT  69<H>  /  AlkPhos  82  02-28        RADIOLOGY/EKG:  CT Chest, Abdomen and Pelvis w/ IV Cont (02.26.24 @ 22:05)     IMPRESSION:    Patchy groundglass/consolidation right middle and left lingular lobes is   suggestive of infection/pneumonia.  Bilateral micronodular, tree-in-bud opacities are suggestive of an   laboratory/infectious airway disease.    Bilateral axillary lymphadenopathy.    12 mm nodule right lobe of the thyroid gland.  Correlate clinically.  Recommend further evaluation with thyroid ultrasonography as indicated.    Distal small bowel obstruction with transition the left lower quadrant as   discussed.    Bilateral pelvic lymphadenopathy as discussed.    Intraluminal air urinary bladder, correlate clinically for recent   instrumentation.    
Patient is a 68y old  Female who presents with a chief complaint of Abdominal pain, nausea, vomiting (27 Feb 2024 01:02)  surgery following for partial SBO  pt currently feels better, denies n/v, states abd pain has improved     Patient seen and examined at bedside    ALLERGIES:  penicillin (Unknown)  Zantac (Hives)    MEDICATIONS  (STANDING):  atorvastatin 20 milliGRAM(s) Oral at bedtime  folic acid 1 milliGRAM(s) Oral daily  heparin   Injectable 5000 Unit(s) SubCutaneous every 12 hours  influenza  Vaccine (HIGH DOSE) 0.7 milliLiter(s) IntraMuscular once  sodium chloride 0.9%. 1000 milliLiter(s) (100 mL/Hr) IV Continuous <Continuous>    MEDICATIONS  (PRN):  acetaminophen     Tablet .. 650 milliGRAM(s) Oral every 6 hours PRN Temp greater or equal to 38C (100.4F), Mild Pain (1 - 3)  aluminum hydroxide/magnesium hydroxide/simethicone Suspension 30 milliLiter(s) Oral every 4 hours PRN Dyspepsia  melatonin 3 milliGRAM(s) Oral at bedtime PRN Insomnia  morphine  - Injectable 2 milliGRAM(s) IV Push every 4 hours PRN Severe Pain (7 - 10)  ondansetron Injectable 4 milliGRAM(s) IV Push every 8 hours PRN Nausea and/or Vomiting    Vital Signs Last 24 Hrs  T(F): 98.1 (27 Feb 2024 05:18), Max: 98.8 (26 Feb 2024 20:49)  HR: 91 (27 Feb 2024 05:18) (91 - 112)  BP: 100/69 (27 Feb 2024 05:18) (100/69 - 143/83)  RR: 16 (27 Feb 2024 05:18) (16 - 18)  SpO2: 94% (27 Feb 2024 05:18) (93% - 95%)    I&O's Summary    26 Feb 2024 07:01  -  27 Feb 2024 07:00  --------------------------------------------------------  IN: 200 mL / OUT: 0 mL / NET: 200 mL    PHYSICAL EXAM:  General: NAD, A/O x 3  ENT: MMM  Neck: Supple, No JVD  Abdomen: soft, nd, nttp, no guarding  Extremities: No calf tenderness, No pitting edema    LABS:                        10.6   14.42 )-----------( 368      ( 27 Feb 2024 06:09 )             34.9     02-27    142  |  105  |  20  ----------------------------<  118  4.2   |  25  |  1.11    Ca    9.1      27 Feb 2024 06:09    TPro  7.1  /  Alb  3.3  /  TBili  0.6  /  DBili  x   /  AST  27  /  ALT  42  /  AlkPhos  87  02-27        Lactate, Blood: 2.0 mmol/L (02-27 @ 06:09)                          Urinalysis Basic - ( 27 Feb 2024 06:09 )    Color: x / Appearance: x / SG: x / pH: x  Gluc: 118 mg/dL / Ketone: x  / Bili: x / Urobili: x   Blood: x / Protein: x / Nitrite: x   Leuk Esterase: x / RBC: x / WBC x   Sq Epi: x / Non Sq Epi: x / Bacteria: x            RADIOLOGY & ADDITIONAL TESTS:    Care Discussed with Consultants/Other Providers:   
68-year-old female past medical history of CLL presenting for epigastric pain, nausea, and vomiting. Surgery following for Partial SBO, today pt admits to abd soreness; improved since admission. admits to flatus, denies bm, nausea or vomiting. Patient was scheduled for colonoscopy today .  Colonoscopy normal   --   discussed f/u with patient and with Dr Cuello  .       Vital Signs Last 24 Hrs  T(C): 36.7 (29 Feb 2024 18:55), Max: 36.7 (29 Feb 2024 18:55)  T(F): 98 (29 Feb 2024 18:55), Max: 98 (29 Feb 2024 18:55)  HR: 85 (29 Feb 2024 18:55) (80 - 85)  BP: 120/69 (29 Feb 2024 18:55) (120/69 - 139/82)  BP(mean): --  RR: 16 (29 Feb 2024 18:55) (16 - 16)  SpO2: 95% (29 Feb 2024 18:55) (95% - 96%)    Parameters below as of 29 Feb 2024 18:55  Patient On (Oxygen Delivery Method): room air    PAST MEDICAL & SURGICAL HISTORY:  GERD (gastroesophageal reflux disease)      Hyperlipidemia      CLL (chronic lymphocytic leukemia)      S/P lymph node biopsy      History of bone marrow biopsy      MEDICATIONS  (STANDING):  atorvastatin 20 milliGRAM(s) Oral at bedtime  cefTRIAXone   IVPB 1000 milliGRAM(s) IV Intermittent every 24 hours  folic acid 1 milliGRAM(s) Oral daily  influenza  Vaccine (HIGH DOSE) 0.7 milliLiter(s) IntraMuscular once  pantoprazole    Tablet 40 milliGRAM(s) Oral before breakfast    MEDICATIONS  (PRN):  acetaminophen     Tablet .. 650 milliGRAM(s) Oral every 6 hours PRN Temp greater or equal to 38C (100.4F), Mild Pain (1 - 3)  aluminum hydroxide/magnesium hydroxide/simethicone Suspension 30 milliLiter(s) Oral every 4 hours PRN Dyspepsia  fluticasone propionate 50 MICROgram(s)/spray Nasal Spray 1 Spray(s) Both Nostrils two times a day PRN nasal irritation  melatonin 3 milliGRAM(s) Oral at bedtime PRN Insomnia  ondansetron Injectable 4 milliGRAM(s) IV Push every 8 hours PRN Nausea and/or Vomiting      PE:  alert and oriented x 3  Lungs:  CTA   Cor rr   abdl:  soft, +BS + flatus normal colonoscopy as per Dr Mcneil                          10.2   8.79  )-----------( 371      ( 29 Feb 2024 05:30 )             34.2   02-29    147<H>  |  110<H>  |  10  ----------------------------<  82  4.1   |  25  |  0.74    Ca    9.3      29 Feb 2024 05:30    TPro  6.8  /  Alb  3.2<L>  /  TBili  0.4  /  DBili  x   /  AST  31  /  ALT  64<H>  /  AlkPhos  81  02-29  
68-year-old female past medical history of CLL presenting for epigastric pain, nausea, and vomiting. Surgery following for Partial SBO, today pt admits to abd soreness; improved since admission. admits to flatus, denies bm, nausea or vomiting.     PAST MEDICAL & SURGICAL HISTORY:  GERD (gastroesophageal reflux disease)      Hyperlipidemia      CLL (chronic lymphocytic leukemia)      S/P lymph node biopsy      History of bone marrow biopsy      MEDICATIONS  (STANDING):  atorvastatin 20 milliGRAM(s) Oral at bedtime  azithromycin  IVPB 500 milliGRAM(s) IV Intermittent every 24 hours  cefTRIAXone   IVPB 1000 milliGRAM(s) IV Intermittent every 24 hours  folic acid 1 milliGRAM(s) Oral daily  heparin   Injectable 5000 Unit(s) SubCutaneous every 12 hours  influenza  Vaccine (HIGH DOSE) 0.7 milliLiter(s) IntraMuscular once  sodium chloride 0.9%. 1000 milliLiter(s) (100 mL/Hr) IV Continuous <Continuous>    MEDICATIONS  (PRN):  acetaminophen     Tablet .. 650 milliGRAM(s) Oral every 6 hours PRN Temp greater or equal to 38C (100.4F), Mild Pain (1 - 3)  aluminum hydroxide/magnesium hydroxide/simethicone Suspension 30 milliLiter(s) Oral every 4 hours PRN Dyspepsia  melatonin 3 milliGRAM(s) Oral at bedtime PRN Insomnia  ondansetron Injectable 4 milliGRAM(s) IV Push every 8 hours PRN Nausea and/or Vomiting    PE: Vital Signs Last 24 Hrs  T(C): 36.9 (28 Feb 2024 06:02), Max: 36.9 (28 Feb 2024 06:02)  T(F): 98.5 (28 Feb 2024 06:02), Max: 98.5 (28 Feb 2024 06:02)  HR: 81 (28 Feb 2024 06:02) (81 - 82)  BP: 105/68 (28 Feb 2024 06:02) (105/68 - 115/71)  BP(mean): --  RR: 16 (28 Feb 2024 06:02) (16 - 16)  SpO2: 96% (28 Feb 2024 06:02) (96% - 97%)    Parameters below as of 28 Feb 2024 06:02  Patient On (Oxygen Delivery Method): room air    Gen: A&o X3 , NAD  ABD: Soft, nd, nt, ng   : voiding   LE: calfs soft, nontender, no swelling bilat                          10.2   9.95  )-----------( 360      ( 28 Feb 2024 09:00 )             34.3   02-28    145  |  110<H>  |  12  ----------------------------<  89  4.7   |  22  |  0.82    Ca    9.1      28 Feb 2024 06:17    TPro  7.0  /  Alb  3.2<L>  /  TBili  0.4  /  DBili  x   /  AST  61<H>  /  ALT  69<H>  /  AlkPhos  82  02-28                  
CC: f/u for abnormal chest CT scan    Patient reports: she has been afebrile throughout her admission, , no abdominal pain, she ate a regular meal but is now being prepped for colonoscopy for 3/1    REVIEW OF SYSTEMS:  All other review of systems negative (Comprehensive ROS): she has had a dry cough x months    Antimicrobials Day #  :day 2  azithromycin  IVPB 500 milliGRAM(s) IV Intermittent every 24 hours  cefTRIAXone   IVPB 1000 milliGRAM(s) IV Intermittent every 24 hours    Other Medications Reviewed    T(F): 98.1 (02-28-24 @ 12:04), Max: 98.5 (02-28-24 @ 06:02)  HR: 83 (02-28-24 @ 12:04)  BP: 110/71 (02-28-24 @ 12:04)  RR: 16 (02-28-24 @ 12:04)  SpO2: 97% (02-28-24 @ 12:04)  Wt(kg): --    PHYSICAL EXAM:  General: alert, no acute distress  Eyes:  anicteric, no conjunctival injection, no discharge  Oropharynx: no lesions or injection 	  Neck: supple, without adenopathy  Lungs: clear to auscultation  Heart: regular rate and rhythm; no murmur, rubs or gallops  Abdomen: soft, nondistended, nontender, without mass or organomegaly  Skin: no lesions  Extremities: no clubbing, cyanosis, or edema  Neurologic: alert, oriented, moves all extremities    LAB RESULTS:                        10.2   9.95  )-----------( 360      ( 28 Feb 2024 09:00 )             34.3     02-28    145  |  110<H>  |  12  ----------------------------<  89  4.7   |  22  |  0.82    Ca    9.1      28 Feb 2024 06:17    TPro  7.0  /  Alb  3.2<L>  /  TBili  0.4  /  DBili  x   /  AST  61<H>  /  ALT  69<H>  /  AlkPhos  82  02-28    LIVER FUNCTIONS - ( 28 Feb 2024 06:17 )  Alb: 3.2 g/dL / Pro: 7.0 g/dL / ALK PHOS: 82 U/L / ALT: 69 U/L / AST: 61 U/L / GGT: x                 MICROBIOLOGY:  RECENT CULTURES:  02-27 @ 02:30 .Blood Blood     No growth at 24 hours      02-27 @ 02:25 .Blood Blood     No growth at 24 hours      02-27 @ 02:03 Clean Catch Clean Catch (Midstream)     <10,000 CFU/mL Normal Urogenital April          RADIOLOGY REVIEWED:    < from: CT Chest w/ IV Cont (02.26.24 @ 22:06) >  IMPRESSION:    Patchy groundglass/consolidation right middle and left lingular lobes is   suggestive of infection/pneumonia.  Bilateral micronodular, tree-in-bud opacities are suggestive of an   laboratory/infectious airway disease.    Bilateral axillary lymphadenopathy.    12 mm nodule right lobe of the thyroid gland.  Correlate clinically.  Recommend further evaluation with thyroid ultrasonography as indicated.    Distal small bowel obstruction with transition the left lower quadrant as   discussed.    Bilateral pelvic lymphadenopathy as discussed.    Intraluminal air urinary bladder, correlate clinically for recent   instrumentation.    Other findings as discussed above.    This study was preliminary reported by [facility].    --- End of Report --    < end of copied text >  
Patient is a 68y old  Female PMH GERD, HLD, CLL who presents with a chief complaint of Abdominal pain, nausea, vomiting (27 Feb 2024 10:13)      Subjective: Patient was seen and examined this morning at bedside. Pt reports feeling well and walking with mild abdominal discomfort. Pt voiding, passing flatus and had bowel movements due to bowel prep. Pt going for colonoscopy this afternoon.   Overnight events: none    REVIEW OF SYSTEMS:  CONSTITUTIONAL: No weakness, fevers or chills  EYES/ENT: No visual changes;  No vertigo or throat pain   NECK: No pain or stiffness  RESPIRATORY: No cough, wheezing, or hemoptysis; No shortness of breath  CARDIOVASCULAR: No chest pain or palpitations  GASTROINTESTINAL: No abdominal or epigastric pain. No nausea, vomiting; No diarrhea or constipation.   GENITOURINARY: No dysuria, frequency or hematuria  NEUROLOGICAL: No numbness or weakness  SKIN: No itching, burning, rashes, or lesions       Vital Signs Last 24 Hrs  T(C): 36.6 (29 Feb 2024 11:56), Max: 36.6 (29 Feb 2024 11:56)  T(F): 97.9 (29 Feb 2024 11:56), Max: 97.9 (29 Feb 2024 11:56)  HR: 81 (29 Feb 2024 11:56) (80 - 85)  BP: 134/81 (29 Feb 2024 11:56) (121/73 - 139/82)  BP(mean): --  RR: 16 (29 Feb 2024 11:56) (16 - 16)  SpO2: 96% (29 Feb 2024 11:56) (95% - 96%)    Parameters below as of 29 Feb 2024 11:56  Patient On (Oxygen Delivery Method): room air      PHYSICAL EXAM  Constitutional: Pt sitting in chair, awake and alert, NAD  HEENT: EOMI, normocephalic, moist mucous membranes  Respiratory: CTABL, No wheezing, rales or rhonchi  Cardiovascular: S1S2+, RRR, no M/G/R  Gastrointestinal: BS+, soft, mild diffuse tenderness, mildly distended  Extremities: No calf pain or edema  Vascular: Peripheral pulses present  Neurological: AAOx3, no focal deficits  Musculoskeletal: Normal muscle tone, no atrophy, no rigidity, no contractions  Skin: No significant new skin lesions or rashes    MEDICATIONS:  MEDICATIONS  (STANDING):  atorvastatin 20 milliGRAM(s) Oral at bedtime  cefTRIAXone   IVPB 1000 milliGRAM(s) IV Intermittent every 24 hours  folic acid 1 milliGRAM(s) Oral daily  influenza  Vaccine (HIGH DOSE) 0.7 milliLiter(s) IntraMuscular once    MEDICATIONS  (PRN):  acetaminophen     Tablet .. 650 milliGRAM(s) Oral every 6 hours PRN Temp greater or equal to 38C (100.4F), Mild Pain (1 - 3)  aluminum hydroxide/magnesium hydroxide/simethicone Suspension 30 milliLiter(s) Oral every 4 hours PRN Dyspepsia  fluticasone propionate 50 MICROgram(s)/spray Nasal Spray 1 Spray(s) Both Nostrils two times a day PRN nasal irritation  melatonin 3 milliGRAM(s) Oral at bedtime PRN Insomnia  ondansetron Injectable 4 milliGRAM(s) IV Push every 8 hours PRN Nausea and/or Vomiting      LABS: All Labs Reviewed:                         10.2   8.79  )-----------( 371      ( 29 Feb 2024 05:30 )             34.2     02-29    147<H>  |  110<H>  |  10  ----------------------------<  82  4.1   |  25  |  0.74    Ca    9.3      29 Feb 2024 05:30    TPro  6.8  /  Alb  3.2<L>  /  TBili  0.4  /  DBili  x   /  AST  31  /  ALT  64<H>  /  AlkPhos  81  02-29    LIVER FUNCTIONS - ( 29 Feb 2024 05:30 )  Alb: 3.2 g/dL / Pro: 6.8 g/dL / ALK PHOS: 81 U/L / ALT: 64 U/L / AST: 31 U/L / GGT: x               RADIOLOGY/EKG:  CT Chest, Abdomen and Pelvis w/ IV Cont (02.26.24 @ 22:05)     IMPRESSION:    Patchy groundglass/consolidation right middle and left lingular lobes is   suggestive of infection/pneumonia.  Bilateral micronodular, tree-in-bud opacities are suggestive of an   laboratory/infectious airway disease.    Bilateral axillary lymphadenopathy.    12 mm nodule right lobe of the thyroid gland.  Correlate clinically.  Recommend further evaluation with thyroid ultrasonography as indicated.    Distal small bowel obstruction with transition the left lower quadrant as   discussed.    Bilateral pelvic lymphadenopathy as discussed.    Intraluminal air urinary bladder, correlate clinically for recent   instrumentation.

## 2024-02-29 NOTE — DISCHARGE NOTE PROVIDER - NSDCCPCAREPLAN_GEN_ALL_CORE_FT
PRINCIPAL DISCHARGE DIAGNOSIS  Diagnosis: SBO (small bowel obstruction)  Assessment and Plan of Treatment: Seek care immediately if:  You have severe abdominal pain that does not get better.  Your heart is beating faster than normal for you.  You have a fever.  Call your doctor if:  You have nausea and are vomiting.  Your abdomen is enlarged.  You cannot pass a bowel movement or gas.  You lose weight without trying.  You have blood in your bowel movement.  You have questions or concerns about your condition or care.  Please follow up with your PCP and GI within 1-2 weeks after your discharge for closer management.     PRINCIPAL DISCHARGE DIAGNOSIS  Diagnosis: SBO (small bowel obstruction)  Assessment and Plan of Treatment: Seek care immediately if:  You have severe abdominal pain that does not get better.  Your heart is beating faster than normal for you.  You have a fever.  Call your doctor if:  You have nausea and are vomiting.  Your abdomen is enlarged.  You cannot pass a bowel movement or gas.  You lose weight without trying.  You have blood in your bowel movement.  You have questions or concerns about your condition or care.  Please follow up with your PCP and GI within 1-2 weeks after your discharge for closer management.      SECONDARY DISCHARGE DIAGNOSES  Diagnosis: Pneumonia, aspiration  Assessment and Plan of Treatment: WHAT IS PNEUMONIA? Pneumonia (PNA) is a lung infection caused by bacteria which makes your lungs inflamed.  THINGS TO DO: (1) Rest as needed (2) Do not smoke – smoking increases your risk for pneumonia (3) Prevent the spread of germs – wash your hands often and cover your mouth when you cough (4) Ask about vaccines with your primary care provider  MONITOR THESE SIGNS AND SYMPTOMS: (1) Worsening confusion (2) Worsening/trouble breathing (3) Fever > 100.4. If you experience any of these, DO alert your primary care provider, or return to the Emergency Department if you feel very sick.  You will go home with Ceftin 500mg bid for the next 4 days to complete a 7 day course.      Diagnosis: Thrombophlebitis  Assessment and Plan of Treatment: You developed right arm thrombophlebitis due to IV infiltration. You can apply warm compress to help reduce swelling. If you develop, SOB, increase swelling or redness, numbness or tingling of the arm, please come to the ED for re-evaluation.

## 2024-03-01 PROBLEM — C91.10 CHRONIC LYMPHOCYTIC LEUKEMIA OF B-CELL TYPE NOT HAVING ACHIEVED REMISSION: Chronic | Status: ACTIVE | Noted: 2024-02-26

## 2024-03-03 LAB
CULTURE RESULTS: SIGNIFICANT CHANGE UP
CULTURE RESULTS: SIGNIFICANT CHANGE UP
SPECIMEN SOURCE: SIGNIFICANT CHANGE UP
SPECIMEN SOURCE: SIGNIFICANT CHANGE UP

## 2024-03-04 LAB — SURGICAL PATHOLOGY STUDY: SIGNIFICANT CHANGE UP

## 2024-03-05 ENCOUNTER — APPOINTMENT (OUTPATIENT)
Dept: INTERNAL MEDICINE | Facility: CLINIC | Age: 69
End: 2024-03-05
Payer: MEDICARE

## 2024-03-05 VITALS
OXYGEN SATURATION: 98 % | SYSTOLIC BLOOD PRESSURE: 124 MMHG | TEMPERATURE: 98.2 F | DIASTOLIC BLOOD PRESSURE: 80 MMHG | WEIGHT: 162 LBS | HEART RATE: 85 BPM | HEIGHT: 62 IN | BODY MASS INDEX: 29.81 KG/M2

## 2024-03-05 DIAGNOSIS — E04.1 NONTOXIC SINGLE THYROID NODULE: ICD-10-CM

## 2024-03-05 PROCEDURE — 99496 TRANSJ CARE MGMT HIGH F2F 7D: CPT

## 2024-03-05 RX ORDER — ROSUVASTATIN CALCIUM 5 MG/1
TABLET, FILM COATED ORAL
Refills: 0 | Status: COMPLETED | COMMUNITY

## 2024-03-07 ENCOUNTER — OUTPATIENT (OUTPATIENT)
Dept: OUTPATIENT SERVICES | Facility: HOSPITAL | Age: 69
LOS: 1 days | End: 2024-03-07
Payer: MEDICARE

## 2024-03-07 ENCOUNTER — APPOINTMENT (OUTPATIENT)
Dept: CT IMAGING | Facility: CLINIC | Age: 69
End: 2024-03-07
Payer: MEDICARE

## 2024-03-07 DIAGNOSIS — Z98.890 OTHER SPECIFIED POSTPROCEDURAL STATES: Chronic | ICD-10-CM

## 2024-03-07 DIAGNOSIS — Z00.8 ENCOUNTER FOR OTHER GENERAL EXAMINATION: ICD-10-CM

## 2024-03-07 PROCEDURE — 74177 CT ABD & PELVIS W/CONTRAST: CPT | Mod: 26,MH

## 2024-03-07 PROCEDURE — 74177 CT ABD & PELVIS W/CONTRAST: CPT | Mod: MH

## 2024-03-08 ENCOUNTER — NON-APPOINTMENT (OUTPATIENT)
Age: 69
End: 2024-03-08

## 2024-03-11 ENCOUNTER — OUTPATIENT (OUTPATIENT)
Dept: OUTPATIENT SERVICES | Facility: HOSPITAL | Age: 69
LOS: 1 days | Discharge: HOME | End: 2024-03-11

## 2024-03-11 DIAGNOSIS — C91.10 CHRONIC LYMPHOCYTIC LEUKEMIA OF B-CELL TYPE NOT HAVING ACHIEVED REMISSION: ICD-10-CM

## 2024-03-11 DIAGNOSIS — Z98.890 OTHER SPECIFIED POSTPROCEDURAL STATES: Chronic | ICD-10-CM

## 2024-03-12 ENCOUNTER — OUTPATIENT (OUTPATIENT)
Dept: OUTPATIENT SERVICES | Facility: HOSPITAL | Age: 69
LOS: 1 days | End: 2024-03-12
Payer: MEDICARE

## 2024-03-12 ENCOUNTER — APPOINTMENT (OUTPATIENT)
Dept: ULTRASOUND IMAGING | Facility: CLINIC | Age: 69
End: 2024-03-12
Payer: MEDICARE

## 2024-03-12 DIAGNOSIS — Z98.890 OTHER SPECIFIED POSTPROCEDURAL STATES: Chronic | ICD-10-CM

## 2024-03-12 DIAGNOSIS — E04.1 NONTOXIC SINGLE THYROID NODULE: ICD-10-CM

## 2024-03-12 PROCEDURE — 76536 US EXAM OF HEAD AND NECK: CPT | Mod: 26

## 2024-03-12 PROCEDURE — 76536 US EXAM OF HEAD AND NECK: CPT

## 2024-03-13 ENCOUNTER — NON-APPOINTMENT (OUTPATIENT)
Age: 69
End: 2024-03-13

## 2024-03-15 ENCOUNTER — APPOINTMENT (OUTPATIENT)
Dept: ORTHOPEDIC SURGERY | Facility: CLINIC | Age: 69
End: 2024-03-15
Payer: MEDICARE

## 2024-03-15 VITALS — BODY MASS INDEX: 29.81 KG/M2 | WEIGHT: 162 LBS | HEIGHT: 62 IN

## 2024-03-15 PROCEDURE — 99214 OFFICE O/P EST MOD 30 MIN: CPT

## 2024-03-15 PROCEDURE — 73564 X-RAY EXAM KNEE 4 OR MORE: CPT | Mod: LT

## 2024-03-18 ENCOUNTER — APPOINTMENT (OUTPATIENT)
Dept: HEMATOLOGY ONCOLOGY | Facility: CLINIC | Age: 69
End: 2024-03-18
Payer: MEDICARE

## 2024-03-18 ENCOUNTER — RESULT REVIEW (OUTPATIENT)
Age: 69
End: 2024-03-18

## 2024-03-18 VITALS
BODY MASS INDEX: 30.24 KG/M2 | SYSTOLIC BLOOD PRESSURE: 122 MMHG | DIASTOLIC BLOOD PRESSURE: 77 MMHG | OXYGEN SATURATION: 97 % | HEART RATE: 85 BPM | RESPIRATION RATE: 18 BRPM | TEMPERATURE: 97.7 F | WEIGHT: 165.32 LBS

## 2024-03-18 DIAGNOSIS — C91.10 CHRONIC LYMPHOCYTIC LEUKEMIA OF B-CELL TYPE NOT HAVING ACHIEVED REMISSION: ICD-10-CM

## 2024-03-18 LAB
ALBUMIN SERPL ELPH-MCNC: 4.3 G/DL
ALP BLD-CCNC: 94 U/L
ALT SERPL-CCNC: 34 U/L
ANION GAP SERPL CALC-SCNC: 11 MMOL/L
AST SERPL-CCNC: 24 U/L
BASOPHILS # BLD AUTO: 0.04 K/UL — SIGNIFICANT CHANGE UP (ref 0–0.2)
BASOPHILS NFR BLD AUTO: 0.6 % — SIGNIFICANT CHANGE UP (ref 0–2)
BILIRUB SERPL-MCNC: 0.3 MG/DL
BUN SERPL-MCNC: 18 MG/DL
CALCIUM SERPL-MCNC: 9.7 MG/DL
CHLORIDE SERPL-SCNC: 106 MMOL/L
CO2 SERPL-SCNC: 25 MMOL/L
CREAT SERPL-MCNC: 0.78 MG/DL
EGFR: 83 ML/MIN/1.73M2
EOSINOPHIL # BLD AUTO: 0.14 K/UL — SIGNIFICANT CHANGE UP (ref 0–0.5)
EOSINOPHIL NFR BLD AUTO: 2 % — SIGNIFICANT CHANGE UP (ref 0–6)
FERRITIN SERPL-MCNC: 230 NG/ML
GLUCOSE SERPL-MCNC: 99 MG/DL
HCT VFR BLD CALC: 39.2 % — SIGNIFICANT CHANGE UP (ref 34.5–45)
HGB BLD-MCNC: 12.3 G/DL — SIGNIFICANT CHANGE UP (ref 11.5–15.5)
IMM GRANULOCYTES NFR BLD AUTO: 0.3 % — SIGNIFICANT CHANGE UP (ref 0–0.9)
IRON SATN MFR SERPL: 19 %
IRON SERPL-MCNC: 56 UG/DL
LYMPHOCYTES # BLD AUTO: 2.9 K/UL — SIGNIFICANT CHANGE UP (ref 1–3.3)
LYMPHOCYTES # BLD AUTO: 40.7 % — SIGNIFICANT CHANGE UP (ref 13–44)
MCHC RBC-ENTMCNC: 28.4 PG — SIGNIFICANT CHANGE UP (ref 27–34)
MCHC RBC-ENTMCNC: 31.6 G/DL — LOW (ref 32–36)
MCV RBC AUTO: 89.9 FL — SIGNIFICANT CHANGE UP (ref 80–100)
MONOCYTES # BLD AUTO: 0.55 K/UL — SIGNIFICANT CHANGE UP (ref 0–0.9)
MONOCYTES NFR BLD AUTO: 7.7 % — SIGNIFICANT CHANGE UP (ref 2–14)
NEUTROPHILS # BLD AUTO: 3.47 K/UL — SIGNIFICANT CHANGE UP (ref 1.8–7.4)
NEUTROPHILS NFR BLD AUTO: 48.7 % — SIGNIFICANT CHANGE UP (ref 43–77)
NRBC # BLD: 0 /100 WBCS — SIGNIFICANT CHANGE UP (ref 0–0)
PLATELET # BLD AUTO: 271 K/UL — SIGNIFICANT CHANGE UP (ref 150–400)
POTASSIUM SERPL-SCNC: 4.5 MMOL/L
PROT SERPL-MCNC: 6.8 G/DL
RBC # BLD: 4.36 M/UL — SIGNIFICANT CHANGE UP (ref 3.8–5.2)
RBC # FLD: 22.6 % — HIGH (ref 10.3–14.5)
SODIUM SERPL-SCNC: 143 MMOL/L
TIBC SERPL-MCNC: 296 UG/DL
UIBC SERPL-MCNC: 240 UG/DL
WBC # BLD: 7.12 K/UL — SIGNIFICANT CHANGE UP (ref 3.8–10.5)
WBC # FLD AUTO: 7.12 K/UL — SIGNIFICANT CHANGE UP (ref 3.8–10.5)

## 2024-03-18 PROCEDURE — G2211 COMPLEX E/M VISIT ADD ON: CPT

## 2024-03-18 PROCEDURE — 99213 OFFICE O/P EST LOW 20 MIN: CPT

## 2024-03-18 NOTE — ASSESSMENT
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  Data: Marrow (1/26/24): absent iron stores  Plan: D/C folate 1mg PO qd  No hematologic restriction for topical or injectable or surgical interventions for her knees.    Over 25 minutes were spent in direct patient care discussing iron def.  Follow-up one year's time

## 2024-03-18 NOTE — HISTORY OF PRESENT ILLNESS
[de-identified] : Kaye was last seen: 2/7/24 [de-identified] : Reason for visit: CLL  Medications: IV iron (second infusion) 2/19/24 folate MVI  lovastatin  vitamin D3  Allergies: Zantac  Kaye does not spontaneously report: fever, chills, sweats, weight loss, skin rashes, petechiae, bruising, eye irritation, hearing loss, mouth sores, sore throat, cough, hemoptysis, pleuritic chest pain, dyspnea, change in vision, focal numbness/weakness, chest pains, palpitations, nausea, vomiting, diarrhea, constipation, dysuria, hematuria, bowel or bladder incontinence, sever joint pain, back pain or gait disturbance.   Examination: Emily is articulate and in no acute distress No occiput, poster cervical, submandibular, sublingual, submental right ant cervical and left supraclavicular 3x3  resolution of Left axillary three nodes each less than 2cm. right axillary 1 x2  Lungs: Clear Cardiac: without rubs Abd: soft and non-tender, Traube's space is tympanitic No inguinal nor femoral adenopathy No sig peripheral edema Gait: unencumbered   CBC 01/22/24: WBC 11.5, Hgb   8.3, MCV 84.1, RDW: 13.8, ,000, total bili 0.2, , retic 62.5, IgG 873 01/26/24: WBC 11.7, Hgb   8.2, MCV 81.2, RDW: 13.9, ,000 02/07/24: WBC 12.1, Hgb   7.5, MCV 82.2, RDW: 14.6, ,000 03/18/24: WBC   7.1, Hgb 12.4, ,000

## 2024-03-22 ENCOUNTER — APPOINTMENT (OUTPATIENT)
Dept: ORTHOPEDIC SURGERY | Facility: CLINIC | Age: 69
End: 2024-03-22

## 2024-03-27 ENCOUNTER — NON-APPOINTMENT (OUTPATIENT)
Age: 69
End: 2024-03-27

## 2024-03-27 ENCOUNTER — APPOINTMENT (OUTPATIENT)
Dept: OPHTHALMOLOGY | Facility: CLINIC | Age: 69
End: 2024-03-27
Payer: MEDICARE

## 2024-03-27 PROCEDURE — 92012 INTRM OPH EXAM EST PATIENT: CPT

## 2024-03-28 ENCOUNTER — APPOINTMENT (OUTPATIENT)
Dept: OPHTHALMOLOGY | Facility: CLINIC | Age: 69
End: 2024-03-28

## 2024-04-02 ENCOUNTER — APPOINTMENT (OUTPATIENT)
Dept: RADIOLOGY | Facility: CLINIC | Age: 69
End: 2024-04-02
Payer: MEDICARE

## 2024-04-02 PROCEDURE — 71046 X-RAY EXAM CHEST 2 VIEWS: CPT

## 2024-04-05 ENCOUNTER — NON-APPOINTMENT (OUTPATIENT)
Age: 69
End: 2024-04-05

## 2024-04-05 ENCOUNTER — APPOINTMENT (OUTPATIENT)
Dept: CARDIOLOGY | Facility: CLINIC | Age: 69
End: 2024-04-05
Payer: MEDICARE

## 2024-04-05 VITALS
OXYGEN SATURATION: 95 % | DIASTOLIC BLOOD PRESSURE: 80 MMHG | HEART RATE: 85 BPM | SYSTOLIC BLOOD PRESSURE: 126 MMHG | WEIGHT: 163 LBS | BODY MASS INDEX: 29.81 KG/M2

## 2024-04-05 DIAGNOSIS — E78.00 PURE HYPERCHOLESTEROLEMIA, UNSPECIFIED: ICD-10-CM

## 2024-04-05 DIAGNOSIS — I51.89 OTHER ILL-DEFINED HEART DISEASES: ICD-10-CM

## 2024-04-05 DIAGNOSIS — R00.2 PALPITATIONS: ICD-10-CM

## 2024-04-05 DIAGNOSIS — I34.1 NONRHEUMATIC MITRAL (VALVE) PROLAPSE: ICD-10-CM

## 2024-04-05 PROCEDURE — G2211 COMPLEX E/M VISIT ADD ON: CPT

## 2024-04-05 PROCEDURE — 93000 ELECTROCARDIOGRAM COMPLETE: CPT

## 2024-04-05 PROCEDURE — 99214 OFFICE O/P EST MOD 30 MIN: CPT

## 2024-04-05 NOTE — HISTORY OF PRESENT ILLNESS
[FreeTextEntry1] : She has a family history of heart disease.  Her father required CABG at approx. age 60; he  at 72 of a myocardial infarction.  Her paternal grandfather also had heart disease; he  at approx. age 50 of an MI.  Her mother also has heart disease, late in life; she had a SAVR at age 79, hx. of PPM and small heart attack.   She passed away at age 97.  Emily has a history of HLD, treated with Crestor.  She has suffered occasional random palps in the past.  As she returns today, her interval history includes axillary adenopathy appreciated by her breast surgeon.  A biopsy was performed and ultimately a diagnosis of CLL was made.  She was evaluated by Dr. Soto of hematology.  Anemia was noted, but she was also found to be iron deficient.  With iron supplementation her counts have improved, and she has not required active treatment for the CLL.  She also was hospitalized for few days at [facility] due to a small bowel obstruction.  Both upper and lower endoscopy did not demonstrate any pathology.  The obstruction spontaneously improved and the score was remains unclear.  She continues to follow-up with an orthopedist at [place] [facility] regarding the fracture of the medial condyle that she suffered last summer.  She still has residual discomfort and they discussed a local and steroid injection with the hope of providing relief.   From a cardiac standpoint, she remains free of symptoms.  She recounts no symptoms.  There have been no episodes exertional CP or significant IGNACIO.  She describes no recent palps.  She continues on rosuvastatin.

## 2024-04-05 NOTE — DISCUSSION/SUMMARY
[EKG obtained to assist in diagnosis and management of assessed problem(s)] : EKG obtained to assist in diagnosis and management of assessed problem(s) [FreeTextEntry1] : EKG today shows:  Sinus Rhythm at 84 bpm.  Normal intervals; mild LAD, otherwise normal  PLAN 1.  HLD - continue Crestor at her current dose.   -  Provided a prescription to recheck a lipid profile and she prefers to do this on a fasting specimen.  She request that we also check her blood counts, blood chemistries and a vitamin D level.    2.  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 - no tx. needed.  3.  Palps - not symptomatic at this time.  32  minutes spent on today's office visit.   I asked her to return here in 6 months.

## 2024-04-09 ENCOUNTER — APPOINTMENT (OUTPATIENT)
Dept: INTERNAL MEDICINE | Facility: CLINIC | Age: 69
End: 2024-04-09
Payer: MEDICARE

## 2024-04-09 VITALS
DIASTOLIC BLOOD PRESSURE: 70 MMHG | TEMPERATURE: 98.1 F | SYSTOLIC BLOOD PRESSURE: 110 MMHG | HEART RATE: 86 BPM | WEIGHT: 163 LBS | HEIGHT: 62 IN | BODY MASS INDEX: 30 KG/M2 | OXYGEN SATURATION: 97 %

## 2024-04-09 DIAGNOSIS — J18.9 PNEUMONIA, UNSPECIFIED ORGANISM: ICD-10-CM

## 2024-04-09 DIAGNOSIS — U07.1 COVID-19: ICD-10-CM

## 2024-04-09 PROCEDURE — G2211 COMPLEX E/M VISIT ADD ON: CPT

## 2024-04-09 PROCEDURE — 94729 DIFFUSING CAPACITY: CPT

## 2024-04-09 PROCEDURE — 94726 PLETHYSMOGRAPHY LUNG VOLUMES: CPT

## 2024-04-09 PROCEDURE — 94010 BREATHING CAPACITY TEST: CPT

## 2024-04-09 PROCEDURE — 99204 OFFICE O/P NEW MOD 45 MIN: CPT | Mod: 25

## 2024-04-09 RX ORDER — FOLIC ACID 1 MG
1 TABLET ORAL DAILY
Qty: 30 | Refills: 2 | Status: DISCONTINUED | COMMUNITY
Start: 2024-02-07 | End: 2024-04-09

## 2024-04-09 NOTE — HISTORY OF PRESENT ILLNESS
[Never] : never [Difficulty Breathing During Exertion] : dyspnea on exertion [Feelings Of Weakness On Exertion] : exercise intolerance [Wheezing] : wheezing [Nasal Passage Blockage (Stuffiness)] : edema [Nonspecific Pain, Swelling, And Stiffness] : chest pain [Fever] : fever [Cough] : coughing [Wt Gain ___ kg] : No recent weight gain [Wt Loss ___ kg] : No recent weight loss [Does not check] : The patient is not checking peak flow at home [More Frequent Use Needed Recently] : Patient reports no recent increase in frequency of [2  -  Slight] : 2, slight [Class II - Mild Symptoms and Slight Limitations] : II [TextBox_4] : Emily comes in for an initial pulmonary evaluation regarding recent history of pneumonia. She denies any prior history of pneumonia, pleurisy, bronchitis, asthma, COPD, tuberculosis, sleep apnea, pulmonary embolism, pneumothorax, lung cancer.  She denies any prior history of lung surgery.  She reports that her mother suffered with COPD.  She has a medication allergy to Zantac.  Her medication list was reviewed.  She is .  She denies any occupational exposures and has no pets in her home.  She denies any recent travel.  PFTs were done today.  A recent chest x-ray was done after hospitalization which was clear.  She had a chest CT done while hospitalized in February.  Vaccines were reviewed.  She denies any tobacco use, alcohol use, or drug use. The patient reports that she had active COVID late last year.  She reports that she was left with a persistent cough after her COVID infection which lasted weeks to months.  She reports that she was hospitalized in February 2024 with a bowel obstruction.  At the time she did have nausea and vomiting.  She reports that chest CT done during her hospitalization revealed patchy ground-glass and airspace consolidation in the right middle lobe and lingula as well as mild bilateral micronodular/tree-in-bud opacities.  She reports that she was treated with a course of azithromycin and ceftriaxone/Ceftin.  She reports that the chest CT findings were incidental.  She reports that she did not have any fever, chest pain, shortness of breath at the time of admission.  She reports that she has not had a follow-up chest CT.  She reports that a recent chest x-ray was normal as per radiology.  She denies any chest pain presently.  She reports that her cough has resolved.  She denies any hemoptysis.  She denies any shortness of breath or wheezing.  She denies any calf pain or edema.  She denies any fevers or chills. [ESS] : 0 [TextBox_42] : 02/24 [TextBox_57] : Chest x-ray 2024 [Reviewed no changes] : Reviewed no changes [AdvancecareDate] : 04/24

## 2024-04-09 NOTE — PHYSICAL EXAM
[No Acute Distress] : no acute distress [Well Nourished] : well nourished [Well Groomed] : well groomed [Well Developed] : well developed [Normal Oropharynx] : normal oropharynx [Supple] : supple [No JVD] : no jvd [Normal Rate/Rhythm] : normal rate/rhythm [Normal S1, S2] : normal s1, s2 [No Resp Distress] : no resp distress [No Acc Muscle Use] : no acc muscle use [Normal Rhythm and Effort] : normal rhythm and effort [Clear to Auscultation Bilaterally] : clear to auscultation bilaterally [No Abnormalities] : no abnormalities [Benign] : benign [Normal Gait] : normal gait [No Clubbing] : no clubbing [No Cyanosis] : no cyanosis [No Edema] : no edema [Normal Color/ Pigmentation] : normal color/ pigmentation [No Focal Deficits] : no focal deficits [Oriented x3] : oriented x3 [Normal Affect] : normal affect [TextBox_11] : Wearing glasses; pupils reactive to light; extraocular muscles intact; TMs clear; no sinus tenderness [TextBox_44] : No stridor [TextBox_54] : No cords [TextBox_68] : R = 16; good air entry; no wheezing noted; no cough noted

## 2024-04-09 NOTE — REASON FOR VISIT
[Initial] : an initial visit [Abnormal CXR/ Chest CT] : an abnormal CXR/ chest CT [Pneumonia] : pneumonia [TextBox_13] : Dr. Evaristo Landis

## 2024-04-09 NOTE — ASSESSMENT
[FreeTextEntry1] : Had acute COVID-19 infection late last year Postinfection had issues with wheezing and shortness of breath along with persistent cough Recently diagnosed with CLL Was hospitalized with nausea and vomiting/bowel obstruction Chest imaging at that time revealed mild bilateral micronodular/tree-in-bud opacities and patchy groundglass airspace consolidations in the right middle lobe and lingula Patient treated for acute pneumonia with Zithromax and ceftriaxone Clinically improved Reports resolution of cough and no other pulmonary complaints/afebrile Recent chest x-ray clear as per radiology ?  Etiology of her chest CT findings ?  COVID pneumonia changes ?  Post COVID superimposed bacterial pneumonia ?  Aspiration pneumonitis ?  Given location of infiltrates and micronodular/tree-in-bud opacities-other underlying process such as NTM should be investigated Will review chest imaging with chest radiology She is presently afebrile and asymptomatic Recent chest x-ray read as clear by radiology She is very reluctant to do a repeat chest CT at this time to ensure resolution of these changes given all of her recent radiation exposure She states that she is considering cardiac imaging with coronary artery calcium score imaging or coronary artery CT with cardiology in the future and hopes that this will suffice to reimage her lungs at that point PFTs are essentially normal Unable to collect any sputum for culture or other study Given her age, recent pneumonia, and history of CLL vaccination with Prevnar 20 followed by vaccination with Pneumovax advised She wishes to return in follow-up as needed She states that if she develops any recurrent pulmonary symptoms or fever that she will contact me and return to see me immediately for pulmonary evaluation and management All of the above was discussed in detail with her and all of her questions were answered She verbally confirmed understanding of all of the above and agreement with the above plan

## 2024-04-09 NOTE — REVIEW OF SYSTEMS
[Menopause] : menopause [Anemia] : anemia [Blood Transfusion] : blood transfusion [History of Iron Deficiency] : history of iron deficiency [Thyroid Problem] : thyroid problem [Negative] : Endocrine

## 2024-04-12 LAB
25(OH)D3 SERPL-MCNC: 44.9 NG/ML
ALBUMIN SERPL ELPH-MCNC: 4.7 G/DL
ALP BLD-CCNC: 95 U/L
ALT SERPL-CCNC: 19 U/L
ANION GAP SERPL CALC-SCNC: 11 MMOL/L
AST SERPL-CCNC: 16 U/L
BILIRUB SERPL-MCNC: 0.4 MG/DL
BUN SERPL-MCNC: 18 MG/DL
CALCIUM SERPL-MCNC: 9.9 MG/DL
CHLORIDE SERPL-SCNC: 103 MMOL/L
CHOLEST SERPL-MCNC: 177 MG/DL
CO2 SERPL-SCNC: 27 MMOL/L
CREAT SERPL-MCNC: 0.77 MG/DL
EGFR: 84 ML/MIN/1.73M2
GLUCOSE SERPL-MCNC: 83 MG/DL
HDLC SERPL-MCNC: 62 MG/DL
LDLC SERPL CALC-MCNC: 96 MG/DL
LDLC SERPL DIRECT ASSAY-MCNC: 100 MG/DL
NONHDLC SERPL-MCNC: 114 MG/DL
POTASSIUM SERPL-SCNC: 4.3 MMOL/L
PROT SERPL-MCNC: 7.1 G/DL
SODIUM SERPL-SCNC: 141 MMOL/L
TRIGL SERPL-MCNC: 102 MG/DL

## 2024-04-29 ENCOUNTER — NON-APPOINTMENT (OUTPATIENT)
Age: 69
End: 2024-04-29

## 2024-05-06 ENCOUNTER — LABORATORY RESULT (OUTPATIENT)
Age: 69
End: 2024-05-06

## 2024-05-06 LAB
BASOPHILS # BLD AUTO: 0.05 K/UL
BASOPHILS NFR BLD AUTO: 0.5 %
EOSINOPHIL # BLD AUTO: 0.15 K/UL
EOSINOPHIL NFR BLD AUTO: 1.5 %
HCT VFR BLD CALC: 44.1 %
HGB BLD-MCNC: 14 G/DL
IMM GRANULOCYTES NFR BLD AUTO: 0.5 %
LYMPHOCYTES # BLD AUTO: 4.1 K/UL
LYMPHOCYTES NFR BLD AUTO: 42 %
MAN DIFF?: NORMAL
MCHC RBC-ENTMCNC: 29.4 PG
MCHC RBC-ENTMCNC: 31.7 GM/DL
MCV RBC AUTO: 92.5 FL
MONOCYTES # BLD AUTO: 0.74 K/UL
MONOCYTES NFR BLD AUTO: 7.6 %
NEUTROPHILS # BLD AUTO: 4.67 K/UL
NEUTROPHILS NFR BLD AUTO: 47.9 %
PLATELET # BLD AUTO: 338 K/UL
RBC # BLD: 4.77 M/UL
RBC # FLD: 18.1 %
WBC # FLD AUTO: 9.76 K/UL

## 2024-05-07 ENCOUNTER — APPOINTMENT (OUTPATIENT)
Dept: INTERNAL MEDICINE | Facility: CLINIC | Age: 69
End: 2024-05-07

## 2024-05-08 NOTE — HISTORY OF PRESENT ILLNESS
[Post-hospitalization from ___ Hospital] : Post-hospitalization from [unfilled] Hospital [Admitted on: ___] : The patient was admitted on [unfilled] [Discharge Summary] : discharge summary [Discharged on ___] : discharged on [unfilled] [Pertinent Labs] : pertinent labs [Discharge Med List] : discharge medication list [Patient Contacted By: ____] : and contacted by [unfilled] [FreeTextEntry2] : Patient for hospital follow-up.  She has been seeing Dr. Soto (Heme/Onc) for a recent diagnosis of CLL.  She had an anemia, and this was found to be iron deficiency, possibly not related to the CLL.  She was given iron and asked to see GI (Dr. Bertrand Conner).  She was given Protonix.  She went for an endoscopy. (with a transfusion beforehand and afterwards).  On Monday 2/26, she has nausea/vomiting x 5, and couldn't function.  She then couldn't hold down food and liquid and then had to go to the ER.  She went to [facility].  She got Zofran, IVF (she declined Morphine).  CT scan showed SBO.  She was seen by general surgery and was told that she didn't need surgery.  She was found to have an infiltrate in the lungs and was given antibiotics for pneumonia.  She was given a clear liquid diet.  She had 1 polyp, internal hemorrhoids, and sigmoid diverticulosis.  She never saw blood in the stool.  She is scheduled to have a CT enterography later this week.  She will have GI follow-up.  She has had a dry cough for several months.  She finished the antibiotics yesterday.  Her Protonix was discontinued.  She is having light meals.  ----------------------------------------------------------------------------------------------------------------------------------------------------------- Hospital Course: Discharge Date 29-Feb-2024 Admission Date 27-Feb-2024 00:33 Reason for Admission Abdominal pain, nausea, vomiting Hospital Course  69y/o F with PMHx CLL, GERD, HLD and s/p recent transfusion 2u pRBC and IV iron transfusion presented to [place] ED for epigastric pain, n/v x 1day. In the ED, Pt was tachycardic with WBC 19.43, Glu 181. On imaging CT a/p found +SBO, b/l pelvic adenopathy and CT chest +consolidation on RML and lingula, 12mm thyroid nodule, b/l axillary adenopathy. Given morphine x1 and Ofirmev x1 for pain, Zofran x1 for nausea/vomiting and 1L IVF bolus. Admitted to medicine for further evaluation of SBO. Surgery evaluated the patient and recommended no surgical intervention was needed at the time. Pt started liquid diet with good tolerance. ID evaluated the Pt and recommended continue Azithromax x3 days and Ceftriaxone for aspiration pneumonia; Pt can be discharged on ceftin 500mg bid to complete a 7 day course. GI recommended colonoscopy for today, which showed 1 polyp, internal hemorrhoid and sigmoidal diverticulosis. Also, GI recommended outpatient CT enterography w/wo small bowel capsule. On imaging, Pt found to have several lymphadenopathy, which should be f/u with PCP or HemeOnc. If patient is able to tolerate dinner, she can be discharged home.   Pt is medically stable and ready for discharge home with f/u with PCP, GI, Surgery and HemeOnc outpatient.  Consults: Surgery, Gastroenterology, Infectious disease  Vital Signs Last 24 Hrs T(C): 36.6 (29 Feb 2024 11:56), Max: 36.6 (29 Feb 2024 11:56) T(F): 97.9 (29 Feb 2024 11:56), Max: 97.9 (29 Feb 2024 11:56) HR: 81 (29 Feb 2024 11:56) (80 - 85) BP: 134/81 (29 Feb 2024 11:56) (121/73 - 139/82) BP(mean): -- RR: 16 (29 Feb 2024 11:56) (16 - 16) SpO2: 96% (29 Feb 2024 11:56) (95% - 96%)  Parameters below as of 29 Feb 2024 11:56 Patient On (Oxygen Delivery Method): room air  PHYSICAL EXAM Constitutional: Pt lying in bed, awake and alert, NAD HEENT: EOMI, normocephalic, moist mucous membranes Respiratory: CTABL, No wheezing, rales or rhonchi Cardiovascular: S1S2+, RRR, no M/G/R Gastrointestinal: BS+, soft, +mild diffuse tenderness mostly in epigastric region, +mildly distended Extremities: No calf pain or edema Vascular: Peripheral pulses present Neurological: AAOx3, no focal deficits Musculoskeletal: Normal muscle tone, no atrophy, no rigidity, no contractions Skin: +warm, erythematous Rt antecubittal fossa from prior IV site, No significant new skin lesions or rashes   Imaging:  CT Chest, Abdomen and Pelvis w/ IV Cont (02.26.24 @ 22:05)  IMPRESSION: Patchy groundglass/consolidation right middle and left lingular lobes is suggestive of infection/pneumonia. Bilateral micronodular, tree-in-bud opacities are suggestive of an laboratory/infectious airway disease.  Bilateral axillary lymphadenopathy.  12 mm nodule right lobe of the thyroid gland. Correlate clinically. Recommend further evaluation with thyroid ultrasonography as indicated.  Distal small bowel obstruction with transition the left lower quadrant as discussed.  Bilateral pelvic lymphadenopathy as discussed.  Intraluminal air urinary bladder, correlate clinically for recent instrumentation.  Other findings as discussed above.   Colonoscopy Impressions:  Diverticulosis of the sigmoid colon. Normal mucosa in the terminal ileum. Grade/Stage I internal hemorrhoids. Polyp (4 mm) in the descending colon. (Polypectomy).   Plan: Colonoscopy in 5 years, (colon polyps) High Fiber Diet Avoid NSAIDs for 10 days Return to floor for further management Await pathology results Continue current medical regimen.    Med Reconciliation: Medication Reconciliation Status Admission Reconciliation is Completed Discharge Reconciliation is Completed Discharge Medications cefuroxime 500 mg oral tablet: 1 tab(s) orally 2 times a day Crestor 5 mg oral tablet: 1 tab(s) orally once a day CT enterography: do not eat or drink for 6 hours prior to test Flonase Allergy Relief 50 mcg/inh nasal spray: 1 spray(s) in each nostril once a day as needed for congestion folic acid 1 mg oral tablet: 1 tab(s) orally once a day Protonix 40 mg oral delayed release tablet: 1 tab(s) orally once a day , , Care Plan/Procedures: Discharge Diagnoses, Assessment and Plan of Treatment PRINCIPAL DISCHARGE DIAGNOSIS Diagnosis: SBO (small bowel obstruction) Assessment and Plan of Treatment: Seek care immediately if: You have severe abdominal pain that does not get better. Your heart is beating faster than normal for you. You have a fever. Call your doctor if: You have nausea and are vomiting. Your abdomen is enlarged. You cannot pass a bowel movement or gas. You lose weight without trying. You have blood in your bowel movement. You have questions or concerns about your condition or care. Please follow up with your PCP and GI within 1-2 weeks after your discharge for closer management.   SECONDARY DISCHARGE DIAGNOSES Diagnosis: Pneumonia, aspiration Assessment and Plan of Treatment: WHAT IS PNEUMONIA? Pneumonia (PNA) is a lung infection caused by bacteria which makes your lungs inflamed. THINGS TO DO: (1) Rest as needed (2) Do not smoke ? smoking increases your risk for pneumonia (3) Prevent the spread of germs ? wash your hands often and cover your mouth when you cough (4) Ask about vaccines with your primary care provider MONITOR THESE SIGNS AND SYMPTOMS: (1) Worsening confusion (2) Worsening/trouble breathing (3) Fever > 100.4. If you experience any of these, DO alert your primary care provider, or return to the Emergency Department if you feel very sick. You will go home with Ceftin 500mg bid for the next 4 days to complete a 7 day course.   Diagnosis: Thrombophlebitis Assessment and Plan of Treatment: You developed right arm thrombophlebitis due to IV infiltration. You can apply warm compress to help reduce swelling. If you develop, SOB, increase swelling or redness, numbness or tingling of the arm, please come to the ED for re-evaluation. Goal(s) To get better and follow your care plan as instructed. Follow Up: Care Providers for Follow up (PCP/Outpatient Provider) [facility] [address], [address] [place] [address] Phone: [phone] Fax: [phone] Follow Up Time:  [facility] [address] [place] [address] Phone: [phone] Fax: [phone] Follow Up Time:  [facility] [address] [place] [address], [address] [place] [address] Phone: [phone] Fax: [phone] Follow Up Time:  [facility] [address], [address] [place] [address] Phone: [phone] Fax: [phone] Follow Up Time:

## 2024-05-08 NOTE — ASSESSMENT
[FreeTextEntry1] : (1) Pulm - patient s/p pneumonia.  Will repeat CXR in about 1 month.  Referral given for patient to see Pulmonary.  (2) GI/Surg - s/p EGD, and then SBO, manged non-operatively.  Follow-up with GI and surgery.  (3) Endo - script given for thyroid US to follow-up CT finding.

## 2024-06-05 ENCOUNTER — APPOINTMENT (OUTPATIENT)
Dept: ORTHOPEDIC SURGERY | Facility: CLINIC | Age: 69
End: 2024-06-05
Payer: MEDICARE

## 2024-06-05 VITALS — HEIGHT: 62 IN | WEIGHT: 160 LBS | BODY MASS INDEX: 29.44 KG/M2

## 2024-06-05 DIAGNOSIS — M17.12 UNILATERAL PRIMARY OSTEOARTHRITIS, LEFT KNEE: ICD-10-CM

## 2024-06-05 DIAGNOSIS — M84.452A PATHOLOGICAL FRACTURE, LEFT FEMUR, INITIAL ENCOUNTER FOR FRACTURE: ICD-10-CM

## 2024-06-05 PROCEDURE — 20610 DRAIN/INJ JOINT/BURSA W/O US: CPT | Mod: LT

## 2024-06-05 PROCEDURE — 99214 OFFICE O/P EST MOD 30 MIN: CPT | Mod: 25

## 2024-06-19 NOTE — ED ADULT NURSE NOTE - ED STAT RN HAND OFF
Forms received and reviewed.    Started, placed on Dr. Allen desk for review and signature     MARIA DEL CARMEN on file can send to [facility] when completed    Handoff

## 2024-07-12 ENCOUNTER — APPOINTMENT (OUTPATIENT)
Dept: OPHTHALMOLOGY | Facility: CLINIC | Age: 69
End: 2024-07-12
Payer: MEDICARE

## 2024-07-12 ENCOUNTER — NON-APPOINTMENT (OUTPATIENT)
Age: 69
End: 2024-07-12

## 2024-07-12 PROCEDURE — 92014 COMPRE OPH EXAM EST PT 1/>: CPT

## 2024-07-25 ENCOUNTER — TRANSCRIPTION ENCOUNTER (OUTPATIENT)
Age: 69
End: 2024-07-25

## 2024-07-29 ENCOUNTER — OUTPATIENT (OUTPATIENT)
Dept: OUTPATIENT SERVICES | Facility: HOSPITAL | Age: 69
LOS: 1 days | Discharge: HOME | End: 2024-07-29

## 2024-07-29 DIAGNOSIS — Z98.890 OTHER SPECIFIED POSTPROCEDURAL STATES: Chronic | ICD-10-CM

## 2024-07-29 DIAGNOSIS — C91.10 CHRONIC LYMPHOCYTIC LEUKEMIA OF B-CELL TYPE NOT HAVING ACHIEVED REMISSION: ICD-10-CM

## 2024-07-30 ENCOUNTER — APPOINTMENT (OUTPATIENT)
Dept: SURGICAL ONCOLOGY | Facility: CLINIC | Age: 69
End: 2024-07-30
Payer: MEDICARE

## 2024-07-30 VITALS
HEART RATE: 86 BPM | SYSTOLIC BLOOD PRESSURE: 120 MMHG | WEIGHT: 159 LBS | BODY MASS INDEX: 29.26 KG/M2 | HEIGHT: 62 IN | OXYGEN SATURATION: 97 % | DIASTOLIC BLOOD PRESSURE: 80 MMHG

## 2024-07-30 PROBLEM — D49.0 SMALL BOWEL TUMOR: Status: ACTIVE | Noted: 2024-07-30

## 2024-07-30 PROCEDURE — 99204 OFFICE O/P NEW MOD 45 MIN: CPT

## 2024-07-31 ENCOUNTER — NON-APPOINTMENT (OUTPATIENT)
Age: 69
End: 2024-07-31

## 2024-07-31 ENCOUNTER — APPOINTMENT (OUTPATIENT)
Dept: INTERNAL MEDICINE | Facility: CLINIC | Age: 69
End: 2024-07-31
Payer: MEDICARE

## 2024-07-31 DIAGNOSIS — D49.0 NEOPLASM OF UNSPECIFIED BEHAVIOR OF DIGESTIVE SYSTEM: ICD-10-CM

## 2024-07-31 PROCEDURE — 99441: CPT | Mod: 93

## 2024-08-01 ENCOUNTER — APPOINTMENT (OUTPATIENT)
Dept: HEMATOLOGY ONCOLOGY | Facility: CLINIC | Age: 69
End: 2024-08-01
Payer: MEDICARE

## 2024-08-01 VITALS
TEMPERATURE: 98 F | HEART RATE: 81 BPM | SYSTOLIC BLOOD PRESSURE: 119 MMHG | OXYGEN SATURATION: 99 % | DIASTOLIC BLOOD PRESSURE: 78 MMHG | RESPIRATION RATE: 18 BRPM | WEIGHT: 159.99 LBS | BODY MASS INDEX: 29.26 KG/M2

## 2024-08-01 PROCEDURE — 99213 OFFICE O/P EST LOW 20 MIN: CPT

## 2024-08-01 PROCEDURE — G2211 COMPLEX E/M VISIT ADD ON: CPT

## 2024-08-01 RX ORDER — LACTULOSE 10 G/15ML
20 SOLUTION ORAL
Qty: 240 | Refills: 0 | Status: ACTIVE | COMMUNITY
Start: 2024-08-01 | End: 1900-01-01

## 2024-08-01 NOTE — REASON FOR VISIT
[Initial Consultation] : an initial consultation for [Referred By: ___] : Referred By: [unfilled] [FreeTextEntry2] : small bowel tumor

## 2024-08-01 NOTE — ASSESSMENT
[FreeTextEntry1] : Ms. AGUILAR BENZ is a 68-year-old female with history of CLL (follows with Dr. 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 Referred by surgeon Dr. Jonnathan Hua.  We discussed that best chance of knowing exactly what is noted on her scans, possibly the cause of her recurrent bowel obstruction is surgical exploration with resection of the involved segment of bowel. We discussed possible benign and malignant etiologies and the final determination would be made after pathologic review.   I discussed in detail the process of a small bowel resection, which would require a hospital stay of a few days. We discussed risks, benefits, and alternatives to surgical exploration.   Patient would like to meet with Dr. Soto (scheduled for 8/1) prior to making a decision.   Plan: Patient to contact our office with surgery decision after f/u with Dr. Soto

## 2024-08-01 NOTE — END OF VISIT
[FreeTextEntry3] : By signing my name below, I, Meyuriycole Clark, attest that this documentation has been prepared under the direction and in the presence of Shelby Pelaez MD in the following sections HISTORY OF PRESENT ILLNESS; PAST MEDICAL/FAMILY/SOCIAL HISTORY; REVIEW OF SYSTEMS; PHYSICAL EXAM; ASSESSMENT/PLAN.

## 2024-08-01 NOTE — HISTORY OF PRESENT ILLNESS
[de-identified] : Ms. AGUILAR BENZ is a 68-year-old female with history of CLL (follows with Dr. 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 Referred by surgeon Dr. Jonnathan Hua.  2/27 - 2/29/2024 patient was admitted to [place] for SBO and quickly recovered.  2/26/2024 A/P CT demonstrated distal small bowel obstruction measuring greater than 3 cm in the pelvis with abrupt transition in the LLQ. The distal small bowel loops were collapsed. Also noted was bilateral pelvic lymphadenopathy.  3/7/2024 CT enterography showed bulky pelvic lymph nodes consistent with history of CLL (3.1 x 1.6 cm right pelvic sidewall node and 3.2 x 1.7 cm left pelvic sidewall node).  7/23 - 7/25/2024 patient was admitted to [facility] for abdominal pain and nausea/vomiting, from which she recovered. 7/23/2024 A/P CT demonstrated few dilated fluid-filled loops of small bowel measuring up to 3.5 cm in the lower abdomen/pelvis with focal narrowing/transition point in the midline anterior abdomen just inferior to the umbilicus. Liquid stool within the ascending and transverse colon. Underdistended sigmoid colon. Suspicion for lymphadenopathy, tumor, or other structural nidus for SBO.  Underwent PET scan in June, upload pending.   PMHx: CLL (diagnosed January 2024), acute gastroenteritis, cellulitis, chalazion, GERD, HLD, iron deficiency anemia, MVP, osteoporosis PSHx: none Meds: multivitamins, rosuvastatin Allergies: Zantac, Penicillins FMHx: breast cancer & squamous cell cancer (mother), rectal cancer (maternal uncle), prostate cancer (father) Social: Never smoker. Drinks alcohol on special occasions. No other drug use. Her nephew is the  at [employer]. She has 3 children.  Patient presents for initial consultation. She notes belching and constipation preceding her bowel obstructions. This past episode in July, she notes nausea/vomiting and abdominal pains - she had 4-5 episodes of vomiting the night she decided to present to the hospital. She was first diagnosed with CCL in 1/2024, scheduled to f/u with Dr. Soto in a few days. Patient was also anemic to hemoglobin ~7 in February 2024 which was consistent with iron deficiency anemia. Underwent colonoscopy and endoscopy at that time which were negative. Had 2 iron infusions prior to colonoscopy.  She has 2 young grandchildren which she will occasionally lift.

## 2024-08-01 NOTE — PHYSICAL EXAM
[FreeTextEntry1] : General: No acute distress. Well nourished and well kempt. Head: AT/NC Eyes: PERRL. EOMI. Pulmonary: No respiratory distress. Trach intact. ABD: Soft. NT/ND. No rebound, no guarding, no rigidity. No peritoneal signs. No masses. Extremities: Warm. No edema. Neurological: A&O x 4. Psychiatric: Normal affect and mood.  [de-identified] : abdomen soft, not distended, slight discomfort upon deep palpation of LLQ

## 2024-08-01 NOTE — HISTORY OF PRESENT ILLNESS
[de-identified] : Ms. AGUILAR BENZ is a 68-year-old female with history of CLL (follows with Dr. 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 Referred by surgeon Dr. Jonnathan Hua.  2/27 - 2/29/2024 patient was admitted to [facility] for SBO and quickly recovered.  2/26/2024 A/P CT demonstrated distal small bowel obstruction measuring greater than 3 cm in the pelvis with abrupt transition in the LLQ. The distal small bowel loops were collapsed. Also noted was bilateral pelvic lymphadenopathy.  3/7/2024 CT enterography showed bulky pelvic lymph nodes consistent with history of CLL (3.1 x 1.6 cm right pelvic sidewall node and 3.2 x 1.7 cm left pelvic sidewall node).  7/23 - 7/25/2024 patient was admitted to [facility] for abdominal pain and nausea/vomiting, from which she recovered. 7/23/2024 A/P CT demonstrated few dilated fluid-filled loops of small bowel measuring up to 3.5 cm in the lower abdomen/pelvis with focal narrowing/transition point in the midline anterior abdomen just inferior to the umbilicus. Liquid stool within the ascending and transverse colon. Underdistended sigmoid colon. Suspicion for lymphadenopathy, tumor, or other structural nidus for SBO.  Underwent PET scan in June, upload pending.   PMHx: CLL (diagnosed January 2024), acute gastroenteritis, cellulitis, chalazion, GERD, HLD, iron deficiency anemia, MVP, osteoporosis PSHx: none Meds: multivitamins, rosuvastatin Allergies: Zantac, Penicillins FMHx: breast cancer & squamous cell cancer (mother), rectal cancer (maternal uncle), prostate cancer (father) Social: Never smoker. Drinks alcohol on special occasions. No other drug use. Her nephew is the  at [employer]. She has 3 children.  Patient presents for initial consultation. She notes belching and constipation preceding her bowel obstructions. This past episode in July, she notes nausea/vomiting and abdominal pains - she had 4-5 episodes of vomiting the night she decided to present to the hospital. She was first diagnosed with CCL in 1/2024, scheduled to f/u with Dr. Soto in a few days. Patient was also anemic to hemoglobin ~7 in February 2024 which was consistent with iron deficiency anemia. Underwent colonoscopy and endoscopy at that time which were negative. Had 2 iron infusions prior to colonoscopy.  She has 2 young grandchildren which she will occasionally lift.

## 2024-08-01 NOTE — PHYSICAL EXAM
[FreeTextEntry1] : General: No acute distress. Well nourished and well kempt. Head: AT/NC Eyes: PERRL. EOMI. Pulmonary: No respiratory distress. Trach intact. ABD: Soft. NT/ND. No rebound, no guarding, no rigidity. No peritoneal signs. No masses. Extremities: Warm. No edema. Neurological: A&O x 4. Psychiatric: Normal affect and mood.  [de-identified] : abdomen soft, not distended, slight discomfort upon deep palpation of LLQ

## 2024-08-02 NOTE — HISTORY OF PRESENT ILLNESS
[de-identified] : Kaye was last seen: 3/18/24 [de-identified] : Reason for visit: CLL  Since last visit: hospitalized for recurrent SBO; evaluated by Surgical Oncology (Dr. Suggs) for small bowel tumor  Medications: IV iron (second infusion) 2/19/24 folate MVI  lovastatin  vitamin D3  Allergies: Zantac  Kaye does not spontaneously report: fever, chills, sweats, weight loss, skin rashes, petechiae, bruising, eye irritation, hearing loss, mouth sores, sore throat, cough, hemoptysis, pleuritic chest pain, dyspnea, change in vision, focal numbness/weakness, chest pains, palpitations, nausea, vomiting, diarrhea, constipation, dysuria, hematuria, bowel or bladder incontinence, sever joint pain, back pain or gait disturbance.   Examination: Emily is articulate and in no acute distress No occiput, poster cervical, submandibular, sublingual, submental right ant cervical and left supraclavicular 3x3  resolution of Left axillary three nodes each less than 2cm. right axillary 1 x2  Lungs: Clear Cardiac: without rubs Abd: soft and non-tender, Traube's space is tympanitic No inguinal nor femoral adenopathy No sig peripheral edema Gait: unencumbered   CBC 01/22/24: WBC 11.5, Hgb   8.3, MCV 84.1, RDW: 13.8, ,000, total bili 0.2, , retic 62.5, IgG 873 01/26/24: WBC 11.7, Hgb   8.2, MCV 81.2, RDW: 13.9, ,000 02/07/24: WBC 12.1, Hgb   7.5, MCV 82.2, RDW: 14.6, ,000 03/18/24: WBC   7.1, Hgb 12.4, ,000 07/25/24: WBC   8.0, Hgb 12.3, ,000

## 2024-08-02 NOTE — HISTORY OF PRESENT ILLNESS
[de-identified] : Kaye was last seen: 3/18/24 [de-identified] : Reason for visit: CLL  Since last visit: hospitalized for recurrent SBO; evaluated by Surgical Oncology (Dr. Suggs) for small bowel tumor  Medications: IV iron (second infusion) 2/19/24 folate MVI  lovastatin  vitamin D3  Allergies: Zantac  Kaye does not spontaneously report: fever, chills, sweats, weight loss, skin rashes, petechiae, bruising, eye irritation, hearing loss, mouth sores, sore throat, cough, hemoptysis, pleuritic chest pain, dyspnea, change in vision, focal numbness/weakness, chest pains, palpitations, nausea, vomiting, diarrhea, constipation, dysuria, hematuria, bowel or bladder incontinence, sever joint pain, back pain or gait disturbance.   Examination: Emily is articulate and in no acute distress No occiput, poster cervical, submandibular, sublingual, submental right ant cervical and left supraclavicular 3x3  resolution of Left axillary three nodes each less than 2cm. right axillary 1 x2  Lungs: Clear Cardiac: without rubs Abd: soft and non-tender, Traube's space is tympanitic No inguinal nor femoral adenopathy No sig peripheral edema Gait: unencumbered   CBC 01/22/24: WBC 11.5, Hgb   8.3, MCV 84.1, RDW: 13.8, ,000, total bili 0.2, , retic 62.5, IgG 873 01/26/24: WBC 11.7, Hgb   8.2, MCV 81.2, RDW: 13.9, ,000 02/07/24: WBC 12.1, Hgb   7.5, MCV 82.2, RDW: 14.6, ,000 03/18/24: WBC   7.1, Hgb 12.4, ,000 07/25/24: WBC   8.0, Hgb 12.3, ,000

## 2024-08-02 NOTE — BEGINNING OF VISIT
[0] : 2) Feeling down, depressed, or hopeless: Not at all (0) [QIV4Rhoyc] : 0 [Pain Scale: ___] : On a scale of 1-10, today the patient's pain is a(n) [unfilled]. [Never] : Never [Date Discussed (MM/DD/YY): ___] : Discussed: [unfilled] [With Patient/Caregiver] : with Patient/Caregiver

## 2024-08-02 NOTE — HISTORY OF PRESENT ILLNESS
[de-identified] : Kaye was last seen: 3/18/24 [de-identified] : Reason for visit: CLL  Since last visit: hospitalized for recurrent SBO; evaluated by Surgical Oncology (Dr. Suggs) for small bowel tumor  Medications: IV iron (second infusion) 2/19/24 folate MVI  lovastatin  vitamin D3  Allergies: Zantac  Kaye does not spontaneously report: fever, chills, sweats, weight loss, skin rashes, petechiae, bruising, eye irritation, hearing loss, mouth sores, sore throat, cough, hemoptysis, pleuritic chest pain, dyspnea, change in vision, focal numbness/weakness, chest pains, palpitations, nausea, vomiting, diarrhea, constipation, dysuria, hematuria, bowel or bladder incontinence, sever joint pain, back pain or gait disturbance.   Examination: Emily is articulate and in no acute distress No occiput, poster cervical, submandibular, sublingual, submental right ant cervical and left supraclavicular 3x3  resolution of Left axillary three nodes each less than 2cm. right axillary 1 x2  Lungs: Clear Cardiac: without rubs Abd: soft and non-tender, Traube's space is tympanitic No inguinal nor femoral adenopathy No sig peripheral edema Gait: unencumbered   CBC 01/22/24: WBC 11.5, Hgb   8.3, MCV 84.1, RDW: 13.8, ,000, total bili 0.2, , retic 62.5, IgG 873 01/26/24: WBC 11.7, Hgb   8.2, MCV 81.2, RDW: 13.9, ,000 02/07/24: WBC 12.1, Hgb   7.5, MCV 82.2, RDW: 14.6, ,000 03/18/24: WBC   7.1, Hgb 12.4, ,000 07/25/24: WBC   8.0, Hgb 12.3, ,000

## 2024-08-02 NOTE — HISTORY OF PRESENT ILLNESS
[de-identified] : Kaye was last seen: 3/18/24 [de-identified] : Reason for visit: CLL  Since last visit: hospitalized for recurrent SBO; evaluated by Surgical Oncology (Dr. Suggs) for small bowel tumor  Medications: IV iron (second infusion) 2/19/24 folate MVI  lovastatin  vitamin D3  Allergies: Zantac  Kaye does not spontaneously report: fever, chills, sweats, weight loss, skin rashes, petechiae, bruising, eye irritation, hearing loss, mouth sores, sore throat, cough, hemoptysis, pleuritic chest pain, dyspnea, change in vision, focal numbness/weakness, chest pains, palpitations, nausea, vomiting, diarrhea, constipation, dysuria, hematuria, bowel or bladder incontinence, sever joint pain, back pain or gait disturbance.   Examination: Emily is articulate and in no acute distress No occiput, poster cervical, submandibular, sublingual, submental right ant cervical and left supraclavicular 3x3  resolution of Left axillary three nodes each less than 2cm. right axillary 1 x2  Lungs: Clear Cardiac: without rubs Abd: soft and non-tender, Traube's space is tympanitic No inguinal nor femoral adenopathy No sig peripheral edema Gait: unencumbered   CBC 01/22/24: WBC 11.5, Hgb   8.3, MCV 84.1, RDW: 13.8, ,000, total bili 0.2, , retic 62.5, IgG 873 01/26/24: WBC 11.7, Hgb   8.2, MCV 81.2, RDW: 13.9, ,000 02/07/24: WBC 12.1, Hgb   7.5, MCV 82.2, RDW: 14.6, ,000 03/18/24: WBC   7.1, Hgb 12.4, ,000 07/25/24: WBC   8.0, Hgb 12.3, ,000

## 2024-08-02 NOTE — ASSESSMENT
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  Data: [place] (1/26/24): absent iron stores  Plan: Heme: although the CT report reads bulky adenopathy, the adenopathy is not particularly large.  CLL adenopathy can induce intussusception and would be an indication for CLL treatment.  Her radiographs do not appear to reveal this pathology.    The radiographic studies are suggestive of a stricture, which as this is recurrent SBO surgical correction is warranted.  However, consult to Dr. Suggs was for intramural mass.  I will contact Dr. Suggs for this expanded differential diagnosis.     Over 25 minutes were spent in direct patient care discussing her recurrent SBO and CLL.     Addendum I: CT: 07/23/2024 COMPARISON: CT abdomen/pelvis 3/7/2024.  FINDINGS: LOWER CHEST: Coronary artery calcifications. Left lower lobe calcified granuloma, otherwise visualized lungs are clear. Multiple subcentimeter in short axis left axillary lymph nodes.  LIVER: Within normal limits. BILE DUCTS: Normal caliber. GALLBLADDER: Within normal limits. SPLEEN: Within normal limits. PANCREAS: Within normal limits. ADRENALS: Within normal limits. KIDNEYS/URETERS: Within normal limits.  BLADDER: Intraluminal focus of air. REPRODUCTIVE ORGANS: Uterus within normal limits.  BOWEL: Small hiatal hernia. Few dilated fluid-filled loops of small bowel measuring up to 3.5 cm in the lower abdomen/pelvis with focal narrowing/transition point in the midline anterior abdomen just inferior to the umbilicus (601-84 and 602-31). Liquid stool within the ascending and transverse colon. Underdistended sigmoid colon. Appendix is normal. PERITONEUM/RETROPERITONEUM: Within normal limits. No pneumoperitoneum. VESSELS: Atherosclerotic changes. LYMPH NODES: Pelvic lymphadenopathy. Reference: Right external iliac node (301/98), 2.4 x 1.6 cm, previously 2.3 x 1.7 cm. Right pelvic sidewall node (301-95), 3.4 x 1.7 cm, previously 3.4 x 1.6 cm. Left pelvic sidewall node (301-100), 3.3 x 1.9 cm, previously 3.5 x 1.7 cm. Additional subcentimeter in short axis retroperitoneal and bilateral inguinal lymph nodes. ABDOMINAL WALL: Within normal limits. BONES: Degenerative changes. Chronic right inferior pubic ramus fracture deformity. Spondylolisthesis L5 on S1.  IMPRESSION: Small bowel obstruction with a transition point in the midline anterior abdomen just inferior to the umbilicus. Underlying stricture is a consideration.  Intraluminal focus of air within the bladder. Correlate for recent instrumentation, otherwise consider infection.  Pelvic lymphadenopathy, stable compared to prior 3/7/2024, in keeping with history of chronic lymphocytic leukemia.

## 2024-08-02 NOTE — BEGINNING OF VISIT
[0] : 2) Feeling down, depressed, or hopeless: Not at all (0) [[id number]] : 0 [Pain Scale: ___] : On a scale of 1-10, today the patient's pain is a(n) [unfilled]. [Never] : Never [Date Discussed (MM/DD/YY): ___] : Discussed: [unfilled] [With Patient/Caregiver] : with Patient/Caregiver

## 2024-08-02 NOTE — BEGINNING OF VISIT
[0] : 2) Feeling down, depressed, or hopeless: Not at all (0) [OVS9Ezcqc] : 0 [Pain Scale: ___] : On a scale of 1-10, today the patient's pain is a(n) [unfilled]. [Never] : Never [Date Discussed (MM/DD/YY): ___] : Discussed: [unfilled] [With Patient/Caregiver] : with Patient/Caregiver

## 2024-08-02 NOTE — BEGINNING OF VISIT
[0] : 2) Feeling down, depressed, or hopeless: Not at all (0) [DJJ8Ojtlx] : 0 [Pain Scale: ___] : On a scale of 1-10, today the patient's pain is a(n) [unfilled]. [Never] : Never [Date Discussed (MM/DD/YY): ___] : Discussed: [unfilled] [With Patient/Caregiver] : with Patient/Caregiver

## 2024-08-02 NOTE — HISTORY OF PRESENT ILLNESS
[de-identified] : Kaye was last seen: 3/18/24 [de-identified] : Reason for visit: CLL  Since last visit: hospitalized for recurrent SBO; evaluated by Surgical Oncology (Dr. Suggs) for small bowel tumor  Medications: IV iron (second infusion) 2/19/24 folate MVI  lovastatin  vitamin D3  Allergies: Zantac  Kaye does not spontaneously report: fever, chills, sweats, weight loss, skin rashes, petechiae, bruising, eye irritation, hearing loss, mouth sores, sore throat, cough, hemoptysis, pleuritic chest pain, dyspnea, change in vision, focal numbness/weakness, chest pains, palpitations, nausea, vomiting, diarrhea, constipation, dysuria, hematuria, bowel or bladder incontinence, sever joint pain, back pain or gait disturbance.   Examination: Emily is articulate and in no acute distress No occiput, poster cervical, submandibular, sublingual, submental right ant cervical and left supraclavicular 3x3  resolution of Left axillary three nodes each less than 2cm. right axillary 1 x2  Lungs: Clear Cardiac: without rubs Abd: soft and non-tender, Traube's space is tympanitic No inguinal nor femoral adenopathy No sig peripheral edema Gait: unencumbered   CBC 01/22/24: WBC 11.5, Hgb   8.3, MCV 84.1, RDW: 13.8, ,000, total bili 0.2, , retic 62.5, IgG 873 01/26/24: WBC 11.7, Hgb   8.2, MCV 81.2, RDW: 13.9, ,000 02/07/24: WBC 12.1, Hgb   7.5, MCV 82.2, RDW: 14.6, ,000 03/18/24: WBC   7.1, Hgb 12.4, ,000 07/25/24: WBC   8.0, Hgb 12.3, ,000

## 2024-08-02 NOTE — BEGINNING OF VISIT
[0] : 2) Feeling down, depressed, or hopeless: Not at all (0) [[id number]] : 0 [Pain Scale: ___] : On a scale of 1-10, today the patient's pain is a(n) [unfilled]. [Never] : Never [Date Discussed (MM/DD/YY): ___] : Discussed: [unfilled] [With Patient/Caregiver] : with Patient/Caregiver

## 2024-08-06 ENCOUNTER — OUTPATIENT (OUTPATIENT)
Dept: OUTPATIENT SERVICES | Facility: HOSPITAL | Age: 69
LOS: 1 days | End: 2024-08-06
Payer: MEDICARE

## 2024-08-06 ENCOUNTER — APPOINTMENT (OUTPATIENT)
Dept: MRI IMAGING | Facility: CLINIC | Age: 69
End: 2024-08-06

## 2024-08-06 DIAGNOSIS — Z98.890 OTHER SPECIFIED POSTPROCEDURAL STATES: Chronic | ICD-10-CM

## 2024-08-06 DIAGNOSIS — K56.609 UNSPECIFIED INTESTINAL OBSTRUCTION, UNSPECIFIED AS TO PARTIAL VERSUS COMPLETE OBSTRUCTION: ICD-10-CM

## 2024-08-06 PROCEDURE — A9585: CPT

## 2024-08-06 PROCEDURE — 72196 MRI PELVIS W/DYE: CPT | Mod: 26,MH

## 2024-08-06 PROCEDURE — 72196 MRI PELVIS W/DYE: CPT | Mod: MH

## 2024-08-06 PROCEDURE — 74182 MRI ABDOMEN W/CONTRAST: CPT | Mod: 26,MH

## 2024-08-06 PROCEDURE — 74182 MRI ABDOMEN W/CONTRAST: CPT | Mod: MH

## 2024-08-15 ENCOUNTER — APPOINTMENT (OUTPATIENT)
Dept: SURGERY | Facility: CLINIC | Age: 69
End: 2024-08-15
Payer: MEDICARE

## 2024-08-15 VITALS
SYSTOLIC BLOOD PRESSURE: 116 MMHG | HEIGHT: 62 IN | TEMPERATURE: 97 F | HEART RATE: 89 BPM | DIASTOLIC BLOOD PRESSURE: 74 MMHG | RESPIRATION RATE: 17 BRPM | WEIGHT: 159 LBS | BODY MASS INDEX: 29.26 KG/M2

## 2024-08-15 DIAGNOSIS — K56.609 UNSPECIFIED INTESTINAL OBSTRUCTION, UNSPECIFIED AS TO PARTIAL VERSUS COMPLETE OBSTRUCTION: ICD-10-CM

## 2024-08-15 PROCEDURE — 99215 OFFICE O/P EST HI 40 MIN: CPT

## 2024-08-15 NOTE — CONSULT LETTER
[Dear  ___] : Dear ~JOSE ALFREDO, [Courtesy Letter:] : I had the pleasure of seeing your patient, [unfilled], in my office today. [Please see my note below.] : Please see my note below. [Consult Closing:] : Thank you very much for allowing me to participate in the care of this patient.  If you have any questions, please do not hesitate to contact me. [Sincerely,] : Sincerely, [DrAtilio  ___] : Dr. HELLER [FreeTextEntry2] : Dr. Bertrand Conner [FreeTextEntry3] : Eric Elizabeth M.D., F.KINGSLEY.C.S., F.A.S.C.R.S. Chief Colorectal Clinical Services, [facility] [DrAtilio ___] : Dr. HELLER

## 2024-08-15 NOTE — HISTORY OF PRESENT ILLNESS
[FreeTextEntry1] : Emily is a 69 y/o male here for a consultation visit, SBO.   Has CLL   Colonoscopy on 2/29/24 - Diverticulosis of the sigmoid colon. Normal mucosa in the terminal ileum. Grade/Stage I internal hemorrhoids. Polyp (4 mm) in the descending colon. (Polypectomy).   CT A+P on 2/26/24 - Distal small bowel obstruction with transition the left lower quadrant as discussed. Bilateral pelvic lymphadenopathy as discussed. Intraluminal air urinary bladder, correlate clinically for recent instrumentation. Other findings as discussed above.  CT Enterography on 3/7/24 - No evidence of bowel inflammation. Bulky pelvic lymph nodes consistent with history of CLL.  Hospitalized on 7/23/24 - 7/25/24 due to periumbilical abdominal pain, nausea, vomiting, diarrhea for 1 day. Recent admission to [place] for SBO improved non-op (no NGT) in February of this year.   CT A+P on 7/23/24 - Small bowel obstruction with a transition point in the midline anterior abdomen just inferior to the umbilicus. Underlying stricture is a consideration. Intraluminal focus of air within the bladder. Correlate for recent instrumentation, otherwise consider infection. Pelvic lymphadenopathy, stable compared to prior 3/7/2024, in keeping with history of chronic lymphocytic leukemia.  MR enterography on 8/6/24 - Interval resolution of small bowel obstruction. No evidence of bowel inflammation or intrinsic bowel lesion.  Today pt reports no pain of abdomen. Daily BMs, formed, takes lactulose daily. Denies nausea/vomiting. Denies fevers/chills. Good appetite, low fiber diet. Not taking any anticoagulants. Has only had 2 SBO episodes.

## 2024-08-15 NOTE — ASSESSMENT
[FreeTextEntry1] : I have seen and evaluated patient, and I have corroborated all nursing input into this note.  Patient has had 2 hospital admissions for small bowel obstruction in the past 6 months.  CT scans at the time of the obstructions showed a possible of stricture or primary small bowel lesion responsible for the obstruction.  However, both a subsequent CT enterography and a subsequent MR enterography showed no evidence of intrinsic small bowel pathology.  Regardless of these findings, the patient had 2 primary small bowel obstructions within several months of each other.  Although the patient has not had prior surgery it is possible that she has an omental adhesion that is responsible for the obstructions in addition to the possibility that she has had 2 false negative enterographies and actually has primary small bowel pathology.  If the patient had had only 1 episode of a primary small bowel obstruction with a normal postobstructive enterography, it would make sense to continue observation.  However, she has already failed this observation period and has had a second obstruction.  Therefore, I recommended a diagnostic laparoscopy understanding the risk of a negative exam.  If there is clear adhesive disease which appears to be responsible for the patient's recent bowel obstructions, then a lysis of adhesions would be performed and the patient would likely be able to go home the same day.  If there is no significant adhesive disease, then I will examine the bowel extracorporeally to confirm that there is no evidence of intrinsic pathology.  If there is any abnormality that is identified within the small bowel, then a small bowel resection would be performed and the patient would require hospital admission until it is clear that the patient is tolerating a diet.  Remaining indications, risks, benefits, alternatives for the surgery reviewed including but not limited to bleeding, infection, and anastomotic leak.  All questions were answered.  The patient will make a decision regarding surgery and she will notify my office.

## 2024-08-15 NOTE — CONSULT LETTER
[Dear  ___] : Dear ~JOSE ALFREDO, [Courtesy Letter:] : I had the pleasure of seeing your patient, [unfilled], in my office today. [Please see my note below.] : Please see my note below. [Consult Closing:] : Thank you very much for allowing me to participate in the care of this patient.  If you have any questions, please do not hesitate to contact me. [Sincerely,] : Sincerely, [DrAtilio  ___] : Dr. HELLER [FreeTextEntry2] : Dr. Bertrand Conner [FreeTextEntry3] : Eric Elizabeth M.D., F.KINGSLEY.C.S., F.A.S.C.R.S. Chief Colorectal Clinical Services, [facility] [DrAtilio ___] : Dr. HELLER

## 2024-08-15 NOTE — HISTORY OF PRESENT ILLNESS
[FreeTextEntry1] : Emily is a 67 y/o male here for a consultation visit, SBO.   Has CLL   Colonoscopy on 2/29/24 - Diverticulosis of the sigmoid colon. Normal mucosa in the terminal ileum. Grade/Stage I internal hemorrhoids. Polyp (4 mm) in the descending colon. (Polypectomy).   CT A+P on 2/26/24 - Distal small bowel obstruction with transition the left lower quadrant as discussed. Bilateral pelvic lymphadenopathy as discussed. Intraluminal air urinary bladder, correlate clinically for recent instrumentation. Other findings as discussed above.  CT Enterography on 3/7/24 - No evidence of bowel inflammation. Bulky pelvic lymph nodes consistent with history of CLL.  Hospitalized on 7/23/24 - 7/25/24 due to periumbilical abdominal pain, nausea, vomiting, diarrhea for 1 day. Recent admission to [place] for SBO improved non-op (no NGT) in February of this year.   CT A+P on 7/23/24 - Small bowel obstruction with a transition point in the midline anterior abdomen just inferior to the umbilicus. Underlying stricture is a consideration. Intraluminal focus of air within the bladder. Correlate for recent instrumentation, otherwise consider infection. Pelvic lymphadenopathy, stable compared to prior 3/7/2024, in keeping with history of chronic lymphocytic leukemia.  MR enterography on 8/6/24 - Interval resolution of small bowel obstruction. No evidence of bowel inflammation or intrinsic bowel lesion.  Today pt reports no pain of abdomen. Daily BMs, formed, takes lactulose daily. Denies nausea/vomiting. Denies fevers/chills. Good appetite, low fiber diet. Not taking any anticoagulants. Has only had 2 SBO episodes.

## 2024-08-15 NOTE — PHYSICAL EXAM
[Normal Breath Sounds] : Normal breath sounds [Normal Heart Sounds] : normal heart sounds [Alert] : alert [Oriented to Person] : oriented to person [Oriented to Place] : oriented to place [Oriented to Time] : oriented to time [Calm] : calm [Abdomen Masses] : No abdominal masses [Abdomen Tenderness] : ~T No ~M abdominal tenderness [de-identified] : WNL [de-identified] : WNL [de-identified] : JIMMYL [de-identified] : WNL [de-identified] : WNL ROM

## 2024-08-21 ENCOUNTER — NON-APPOINTMENT (OUTPATIENT)
Age: 69
End: 2024-08-21

## 2024-08-26 NOTE — HISTORY OF PRESENT ILLNESS
[de-identified] : Patient Name: AGUILAR BENZ  MRN: [id number]  Referring Provider: none  Oncologist: Selvin Soto MD Date: 08/26/2024   Diagnosis: small bowel obstruction  68 year old female presents for evaluation of a small bowel obstruction. She has a PMH of CLL and follows with Dr. Soto. She presents today for a surgical opinion of her history of small bowel obstructions.  2/27/24-2/29/24 She was admitted to [place] for SBO. CT showed distal small bowel obstruction, with transition the left lower quadrant. She was also noted at this time to have bilateral pelvic lymphadenopathy. Colonoscopy was done on 2/29/24 and was found to have diverticulosis in the sigmoid, hemorrhoids and a polyp in the descending colon. She was treated medically and was discharged. 3/7/24 - CTE showed bulky pelvic lymph nodes consistent with her history of CLL. No bowel obstruction. 6/20/24 - She had a PETCT at [facility] for her history of CLL and bowels were without bowel wall thickening. 7/23/24-7/25-24 She was admitted to [facility] and CT revealed few dilated fluid filled loops of small bowel measuring up to 3.5 cm in the lower abdomen/pelvis with focal narrowing/transition point in the midline anterior abdomen just inferior to umbilicus. And underlying stricture is a consideration. 7/30/24 - She was evaluated by Dr. Shelby Alberto of surgical oncology for a surgical opinion of "suspected small bowel tumor visualized on multiple CT scans on workup for small bowel obstructions". A small bowel resection was discussed but patient did not follow up for surgical planning. 8/6/24 - MRE showed interval resolution of small bowel obstruction, no bowel inflammation or intrinsic bowel lesion. 8/15/24 - She was evaluated by Dr. Eric Elizabeth of general surgery for a surgical opinion. She was advised to proceed with a diagnostic laparoscopy, possibly lysis of adhesions and a small bowel resection if there were any abnormalities during the laparoscopy. She did not follow up for surgical planning. She now presents for her third surgical opinion.   Currently, Ms. BENZ ~  ~  abdominal pain and discomfort, ~  ~ having decreased appetite, and ~  ~ nausea or vomiting. She ~  ~ changes in bowel habits and ~  ~ recent unintentional weight loss. She ~  ~ fevers, chills, or night sweats.   Functional Status: Ms. BENZ is able to ~  ~ without fatigue or dyspnea.   She ~  ~ genetic testing

## 2024-08-28 ENCOUNTER — APPOINTMENT (OUTPATIENT)
Dept: SURGICAL ONCOLOGY | Facility: CLINIC | Age: 69
End: 2024-08-28

## 2024-09-03 ENCOUNTER — OUTPATIENT (OUTPATIENT)
Dept: OUTPATIENT SERVICES | Facility: HOSPITAL | Age: 69
LOS: 1 days | End: 2024-09-03
Payer: MEDICARE

## 2024-09-03 VITALS
HEART RATE: 86 BPM | RESPIRATION RATE: 12 BRPM | DIASTOLIC BLOOD PRESSURE: 76 MMHG | SYSTOLIC BLOOD PRESSURE: 115 MMHG | TEMPERATURE: 98 F | OXYGEN SATURATION: 97 % | HEIGHT: 62 IN | WEIGHT: 158.95 LBS

## 2024-09-03 DIAGNOSIS — Z98.890 OTHER SPECIFIED POSTPROCEDURAL STATES: Chronic | ICD-10-CM

## 2024-09-03 DIAGNOSIS — K56.609 UNSPECIFIED INTESTINAL OBSTRUCTION, UNSPECIFIED AS TO PARTIAL VERSUS COMPLETE OBSTRUCTION: ICD-10-CM

## 2024-09-03 DIAGNOSIS — Z01.818 ENCOUNTER FOR OTHER PREPROCEDURAL EXAMINATION: ICD-10-CM

## 2024-09-03 LAB
ANION GAP SERPL CALC-SCNC: 13 MMOL/L — SIGNIFICANT CHANGE UP (ref 5–17)
BLD GP AB SCN SERPL QL: NEGATIVE — SIGNIFICANT CHANGE UP
BUN SERPL-MCNC: 24 MG/DL — HIGH (ref 7–23)
CALCIUM SERPL-MCNC: 10.2 MG/DL — SIGNIFICANT CHANGE UP (ref 8.4–10.5)
CHLORIDE SERPL-SCNC: 103 MMOL/L — SIGNIFICANT CHANGE UP (ref 96–108)
CO2 SERPL-SCNC: 24 MMOL/L — SIGNIFICANT CHANGE UP (ref 22–31)
CREAT SERPL-MCNC: 0.73 MG/DL — SIGNIFICANT CHANGE UP (ref 0.5–1.3)
EGFR: 90 ML/MIN/1.73M2 — SIGNIFICANT CHANGE UP
GLUCOSE SERPL-MCNC: 91 MG/DL — SIGNIFICANT CHANGE UP (ref 70–99)
HCT VFR BLD CALC: 40.8 % — SIGNIFICANT CHANGE UP (ref 34.5–45)
HGB BLD-MCNC: 13 G/DL — SIGNIFICANT CHANGE UP (ref 11.5–15.5)
MCHC RBC-ENTMCNC: 30.7 PG — SIGNIFICANT CHANGE UP (ref 27–34)
MCHC RBC-ENTMCNC: 31.9 GM/DL — LOW (ref 32–36)
MCV RBC AUTO: 96.5 FL — SIGNIFICANT CHANGE UP (ref 80–100)
NRBC # BLD: 0 /100 WBCS — SIGNIFICANT CHANGE UP (ref 0–0)
PLATELET # BLD AUTO: 268 K/UL — SIGNIFICANT CHANGE UP (ref 150–400)
POTASSIUM SERPL-MCNC: 4.2 MMOL/L — SIGNIFICANT CHANGE UP (ref 3.5–5.3)
POTASSIUM SERPL-SCNC: 4.2 MMOL/L — SIGNIFICANT CHANGE UP (ref 3.5–5.3)
RBC # BLD: 4.23 M/UL — SIGNIFICANT CHANGE UP (ref 3.8–5.2)
RBC # FLD: 12.3 % — SIGNIFICANT CHANGE UP (ref 10.3–14.5)
RH IG SCN BLD-IMP: POSITIVE — SIGNIFICANT CHANGE UP
SODIUM SERPL-SCNC: 140 MMOL/L — SIGNIFICANT CHANGE UP (ref 135–145)
WBC # BLD: 14.83 K/UL — HIGH (ref 3.8–10.5)
WBC # FLD AUTO: 14.83 K/UL — HIGH (ref 3.8–10.5)

## 2024-09-03 PROCEDURE — 86850 RBC ANTIBODY SCREEN: CPT

## 2024-09-03 PROCEDURE — 86900 BLOOD TYPING SEROLOGIC ABO: CPT

## 2024-09-03 PROCEDURE — G0463: CPT

## 2024-09-03 PROCEDURE — 83036 HEMOGLOBIN GLYCOSYLATED A1C: CPT

## 2024-09-03 PROCEDURE — 85027 COMPLETE CBC AUTOMATED: CPT

## 2024-09-03 PROCEDURE — 86901 BLOOD TYPING SEROLOGIC RH(D): CPT

## 2024-09-03 PROCEDURE — 80048 BASIC METABOLIC PNL TOTAL CA: CPT

## 2024-09-03 RX ORDER — CIPROFLOXACIN HCL 250 MG
400 TABLET ORAL ONCE
Refills: 0 | Status: DISCONTINUED | OUTPATIENT
Start: 2024-09-13 | End: 2024-09-14

## 2024-09-03 RX ORDER — CLINDAMYCIN PHOSPHATE 150 MG/ML
900 VIAL (ML) INJECTION ONCE
Refills: 0 | Status: DISCONTINUED | OUTPATIENT
Start: 2024-09-13 | End: 2024-09-14

## 2024-09-03 NOTE — H&P PST ADULT - MUSCULOSKELETAL
details… knee pain/decreased ROM due to pain/normal gait/strength 5/5 bilateral upper extremities/strength 5/5 bilateral lower extremities

## 2024-09-03 NOTE — H&P PST ADULT - NSICDXPASTMEDICALHX_GEN_ALL_CORE_FT
PAST MEDICAL HISTORY:  CLL (chronic lymphocytic leukemia)     GERD (gastroesophageal reflux disease)     History of anemia     History of small bowel obstruction     Hyperlipidemia

## 2024-09-03 NOTE — H&P PST ADULT - PROBLEM SELECTOR PLAN 1
ERP, Diagnostic Laparoscopy, Possible Small Bowel Resection on 9/10/24.   CBC, BMP, T&S, and HemA1c done today at [facility].   Pre op instructions, including chlorhexidine, provided and all questions answered.   ERP instructions provided.

## 2024-09-03 NOTE — H&P PST ADULT - HISTORY OF PRESENT ILLNESS
69 year old female with PMH of CLL (follows with hem/onc), Anemia (patient states in 2/2024 she was found to be very anemic with unknown cause and received a blood transfusion and iron infusion x2- underwent endoscopy and colonoscopy with no cause of anemia), HLD, GERD. Patient states that since 2/2024 she has had multiple episodes of small bowel obstruction, but states that no definite cause has been identified. She states that it was suspected she may have a bowel stricture, and was also thought it may be a tumor but states that doctors so not believe that to be the case. She has been taking lactulose every day, and has not had symptoms of small bowel obstruction recently. She states that she is planned to undergo a capsule study next week. She presents today to PST prior to scheduled ERP, Diagnostic Laparoscopy, Possible Small Bowel Resection on 9/10/24. Patient denies recent fever, chills, chest pain, SOB, palpitations, or recent exposure to COVID-19.

## 2024-09-03 NOTE — H&P PST ADULT - CONSTITUTIONAL
[FreeTextEntry1] : Documented by Ethan Dewitt acting as a scribe for Dee Horton on 12/07/2021 . All medical record entries made by the Scribe were at my, Dee Horton , direction and personally dictated by me on 12/07/2021  . I have reviewed the chart and agree that the record accurately reflects my personal performance of the history, physical exam, assessment and plan. I have also personally directed, reviewed, and agreed with the chart.  well-groomed/no distress

## 2024-09-03 NOTE — H&P PST ADULT - ASSESSMENT
Activity: Patient states she can walk 2-3 blocks and climb flight of stairs with no symptoms, but is limited to do more by back and knee pain.     DASI: 6.45    Mallampati: 2    Dental: Denies loose teeth or dentures.     CAPRINI SCORE    AGE RELATED RISK FACTORS                                                             [ ] Age 41-60 years                                            (1 Point)  [X ] Age: 61-74 years                                           (2 Points)                 [ ] Age= 75 years                                                (3 Points)             DISEASE RELATED RISK FACTORS                                                       [ ] Edema in the lower extremities                 (1 Point)                     [ ] Varicose veins                                               (1 Point)                                 [X ] BMI > 25 Kg/m2                                            (1 Point)                                  [ ] Serious infection (ie PNA)                            (1 Point)                     [ ] Lung disease ( COPD, Emphysema)            (1 Point)                                                                          [ ] Acute myocardial infarction                         (1 Point)                  [ ] Congestive heart failure (in the previous month)  (1 Point)         [ ] Inflammatory bowel disease                            (1 Point)                  [ ] Central venous access, PICC or Port               (2 points)       (within the last month)                                                                [ ] Stroke (in the previous month)                        (5 Points)    [X ] Previous or present malignancy                       (2 points)                                                                                                                                                         HEMATOLOGY RELATED FACTORS                                                         [ ] Prior episodes of VTE                                     (3 Points)                     [ ] Positive family history for VTE                      (3 Points)                  [ ] Prothrombin 28105 A                                     (3 Points)                     [ ] Factor V Leiden                                                (3 Points)                        [ ] Lupus anticoagulants                                      (3 Points)                                                           [ ] Anticardiolipin antibodies                              (3 Points)                                                       [ ] High homocysteine in the blood                   (3 Points)                                             [ ] Other congenital or acquired thrombophilia      (3 Points)                                                [ ] Heparin induced thrombocytopenia                  (3 Points)                                        MOBILITY RELATED FACTORS  [ ] Bed rest                                                         (1 Point)  [ ] Plaster cast                                                    (2 points)  [ ] Bed bound for more than 72 hours           (2 Points)    GENDER SPECIFIC FACTORS  [ ] Pregnancy or had a baby within the last month   (1 Point)  [ ] Post-partum < 6 weeks                                   (1 Point)  [ ] Hormonal therapy  or oral contraception   (1 Point)  [ ] History of pregnancy complications              (1 point)  [ ] Unexplained or recurrent              (1 Point)    OTHER RISK FACTORS                                           (1 Point)  [ ] BMI >40, smoking, diabetes requiring insulin, chemotherapy  blood transfusions and length of surgery over 2 hours    SURGERY RELATED RISK FACTORS  [ ]  Section within the last month     (1 Point)  [ ] Minor surgery                                                  (1 Point)  [ ] Arthroscopic surgery                                       (2 Points)  [X ] Planned major surgery lasting more            (2 Points)      than 45 minutes     [ ] Elective hip or knee joint replacement       (5 points)       surgery                                                TRAUMA RELATED RISK FACTORS  [ ] Fracture of the hip, pelvis, or leg                       (5 Points)  [ ] Spinal cord injury resulting in paralysis             (5 points)       (in the previous month)    [ ] Paralysis  (less than 1 month)                             (5 Points)  [ ] Multiple Trauma within 1 month                        (5 Points)    Total Score [   7     ]    Caprini Score 0-2: Low Risk, NO VTE prophylaxis required for most patients, encourage ambulation  Caprini Score 3-6: Moderate Risk , pharmacologic VTE prophylaxis is indicated for most patients (in the absence of contraindications)  Caprini Score Greater than or =7: High risk, pharmocologic VTE prophylaxis indicated for most patients (in the absence of contraindications)

## 2024-09-04 LAB
A1C WITH ESTIMATED AVERAGE GLUCOSE RESULT: 5.5 % — SIGNIFICANT CHANGE UP (ref 4–5.6)
ESTIMATED AVERAGE GLUCOSE: 111 MG/DL — SIGNIFICANT CHANGE UP (ref 68–114)

## 2024-09-05 ENCOUNTER — APPOINTMENT (OUTPATIENT)
Dept: INTERNAL MEDICINE | Facility: CLINIC | Age: 69
End: 2024-09-05
Payer: MEDICARE

## 2024-09-05 ENCOUNTER — RESULT REVIEW (OUTPATIENT)
Age: 69
End: 2024-09-05

## 2024-09-05 ENCOUNTER — APPOINTMENT (OUTPATIENT)
Dept: HEMATOLOGY ONCOLOGY | Facility: CLINIC | Age: 69
End: 2024-09-05
Payer: MEDICARE

## 2024-09-05 VITALS
WEIGHT: 159 LBS | HEART RATE: 80 BPM | DIASTOLIC BLOOD PRESSURE: 74 MMHG | TEMPERATURE: 97.7 F | SYSTOLIC BLOOD PRESSURE: 116 MMHG | BODY MASS INDEX: 29.08 KG/M2 | RESPIRATION RATE: 16 BRPM | OXYGEN SATURATION: 97 %

## 2024-09-05 DIAGNOSIS — C91.10 CHRONIC LYMPHOCYTIC LEUKEMIA OF B-CELL TYPE NOT HAVING ACHIEVED REMISSION: ICD-10-CM

## 2024-09-05 PROBLEM — Z86.2 PERSONAL HISTORY OF DISEASES OF THE BLOOD AND BLOOD-FORMING ORGANS AND CERTAIN DISORDERS INVOLVING THE IMMUNE MECHANISM: Chronic | Status: ACTIVE | Noted: 2024-09-03

## 2024-09-05 PROBLEM — Z87.19 PERSONAL HISTORY OF OTHER DISEASES OF THE DIGESTIVE SYSTEM: Chronic | Status: ACTIVE | Noted: 2024-09-03

## 2024-09-05 LAB
ALBUMIN SERPL ELPH-MCNC: 4.1 G/DL
ALP BLD-CCNC: 96 U/L
ALT SERPL-CCNC: 18 U/L
ANION GAP SERPL CALC-SCNC: 9 MMOL/L
AST SERPL-CCNC: 19 U/L
BASOPHILS # BLD AUTO: 0.06 K/UL — SIGNIFICANT CHANGE UP (ref 0–0.2)
BASOPHILS NFR BLD AUTO: 0.6 % — SIGNIFICANT CHANGE UP (ref 0–2)
BILIRUB SERPL-MCNC: 0.4 MG/DL
BUN SERPL-MCNC: 20 MG/DL
CALCIUM SERPL-MCNC: 10 MG/DL
CHLORIDE SERPL-SCNC: 105 MMOL/L
CO2 SERPL-SCNC: 27 MMOL/L
CREAT SERPL-MCNC: 0.78 MG/DL
EGFR: 82 ML/MIN/1.73M2
EOSINOPHIL # BLD AUTO: 0.13 K/UL — SIGNIFICANT CHANGE UP (ref 0–0.5)
EOSINOPHIL NFR BLD AUTO: 1.3 % — SIGNIFICANT CHANGE UP (ref 0–6)
GLUCOSE SERPL-MCNC: 100 MG/DL
HCT VFR BLD CALC: 40.5 % — SIGNIFICANT CHANGE UP (ref 34.5–45)
HGB BLD-MCNC: 13.1 G/DL — SIGNIFICANT CHANGE UP (ref 11.5–15.5)
IMM GRANULOCYTES NFR BLD AUTO: 0.3 % — SIGNIFICANT CHANGE UP (ref 0–0.9)
LYMPHOCYTES # BLD AUTO: 3.82 K/UL — HIGH (ref 1–3.3)
LYMPHOCYTES # BLD AUTO: 37.3 % — SIGNIFICANT CHANGE UP (ref 13–44)
MCHC RBC-ENTMCNC: 31.7 PG — SIGNIFICANT CHANGE UP (ref 27–34)
MCHC RBC-ENTMCNC: 32.3 G/DL — SIGNIFICANT CHANGE UP (ref 32–36)
MCV RBC AUTO: 98.1 FL — SIGNIFICANT CHANGE UP (ref 80–100)
MONOCYTES # BLD AUTO: 0.84 K/UL — SIGNIFICANT CHANGE UP (ref 0–0.9)
MONOCYTES NFR BLD AUTO: 8.2 % — SIGNIFICANT CHANGE UP (ref 2–14)
NEUTROPHILS # BLD AUTO: 5.37 K/UL — SIGNIFICANT CHANGE UP (ref 1.8–7.4)
NEUTROPHILS NFR BLD AUTO: 52.3 % — SIGNIFICANT CHANGE UP (ref 43–77)
NRBC # BLD: 0 /100 WBCS — SIGNIFICANT CHANGE UP (ref 0–0)
PLATELET # BLD AUTO: 273 K/UL — SIGNIFICANT CHANGE UP (ref 150–400)
POTASSIUM SERPL-SCNC: 4.5 MMOL/L
PROT SERPL-MCNC: 7.3 G/DL
RBC # BLD: 4.13 M/UL — SIGNIFICANT CHANGE UP (ref 3.8–5.2)
RBC # FLD: 12.1 % — SIGNIFICANT CHANGE UP (ref 10.3–14.5)
SODIUM SERPL-SCNC: 141 MMOL/L
WBC # BLD: 10.25 K/UL — SIGNIFICANT CHANGE UP (ref 3.8–10.5)
WBC # FLD AUTO: 10.25 K/UL — SIGNIFICANT CHANGE UP (ref 3.8–10.5)

## 2024-09-05 PROCEDURE — 99213 OFFICE O/P EST LOW 20 MIN: CPT

## 2024-09-05 PROCEDURE — 99442: CPT | Mod: 93

## 2024-09-06 NOTE — HISTORY OF PRESENT ILLNESS
[de-identified] : Kaye was last seen: 8/1/24 [de-identified] : Reason for visit: CLL and Surgical Clearance   Since last visit: Feeling well saw several GI surgeons regarding opinion on surgery. Planned for capsule study and diagnostic laparoscopy to evaluate for adhesions scheduled for next week.   Kaye does not spontaneously report: fever, chills, sweats, weight loss, skin rashes, petechiae, bruising, eye irritation, hearing loss, mouth sores, sore throat, cough, hemoptysis, pleuritic chest pain, dyspnea, change in vision, focal numbness/weakness, chest pains, palpitations, nausea, vomiting, diarrhea, constipation, dysuria, hematuria, bowel or bladder incontinence, sever joint pain, back pain or gait disturbance.  Medications: IV iron (second infusion) 2/19/24 folate MVI  lovastatin  vitamin D3  Allergies: Zantac  Examination: Emily is articulate and in no acute distress No occiput, poster cervical, submandibular, sublingual, submental right ant cervical and left supraclavicular 2x2 right axillary 2 x2  Lungs: Clear Cardiac: without rubs Abd: soft and non-tender, Traube's space is tympanitic No inguinal nor femoral adenopathy No sig peripheral edema Gait: unencumbered   CBC 01/22/24: WBC 11.5, Hgb   8.3, MCV 84.1, RDW: 13.8, ,000, total bili 0.2, , retic 62.5, IgG 873 01/26/24: WBC 11.7, Hgb   8.2, MCV 81.2, RDW: 13.9, ,000 02/07/24: WBC 12.1, Hgb   7.5, MCV 82.2, RDW: 14.6, ,000 03/18/24: WBC   7.1, Hgb 12.4, ,000 07/25/24: WBC   8.0, Hgb 12.3, ,000 09/05/24: WBC 10.2, Hgb 13.1, ,000

## 2024-09-06 NOTE — ASSESSMENT
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  Data: Marrow (1/26/24): absent iron stores  Plan: Heme: although the CT report reads bulky adenopathy, the adenopathy is not particularly large.  CLL adenopathy can induce intussusception and would be an indication for CLL treatment.  Her radiographs do not appear to reveal this pathology. The radiographic studies are suggestive of a stricture, which as this is recurrent SBO surgical correction is warranted.    Ms. Rae is under our care for her CLL from a hematological perspective there is no contraindication to her upcoming surgery.     Addendum I: CT: 07/23/2024 COMPARISON: CT abdomen/pelvis 3/7/2024.  FINDINGS: LOWER CHEST: Coronary artery calcifications. Left lower lobe calcified granuloma, otherwise visualized lungs are clear. Multiple subcentimeter in short axis left axillary lymph nodes.  LIVER: Within normal limits. BILE DUCTS: Normal caliber. GALLBLADDER: Within normal limits. SPLEEN: Within normal limits. PANCREAS: Within normal limits. ADRENALS: Within normal limits. KIDNEYS/URETERS: Within normal limits.  BLADDER: Intraluminal focus of air. REPRODUCTIVE ORGANS: Uterus within normal limits.  BOWEL: Small hiatal hernia. Few dilated fluid-filled loops of small bowel measuring up to 3.5 cm in the lower abdomen/pelvis with focal narrowing/transition point in the midline anterior abdomen just inferior to the umbilicus (601-84 and 602-31). Liquid stool within the ascending and transverse colon. Underdistended sigmoid colon. Appendix is normal. PERITONEUM/RETROPERITONEUM: Within normal limits. No pneumoperitoneum. VESSELS: Atherosclerotic changes. LYMPH NODES: Pelvic lymphadenopathy. Reference: Right external iliac node (301/98), 2.4 x 1.6 cm, previously 2.3 x 1.7 cm. Right pelvic sidewall node (301-95), 3.4 x 1.7 cm, previously 3.4 x 1.6 cm. Left pelvic sidewall node (301-100), 3.3 x 1.9 cm, previously 3.5 x 1.7 cm. Additional subcentimeter in short axis retroperitoneal and bilateral inguinal lymph nodes. ABDOMINAL WALL: Within normal limits. BONES: Degenerative changes. Chronic right inferior pubic ramus fracture deformity. Spondylolisthesis L5 on S1.  IMPRESSION: Small bowel obstruction with a transition point in the midline anterior abdomen just inferior to the umbilicus. Underlying stricture is a consideration.  Intraluminal focus of air within the bladder. Correlate for recent instrumentation, otherwise consider infection.  Pelvic lymphadenopathy, stable compared to prior 3/7/2024, in keeping with history of chronic lymphocytic leukemia.

## 2024-09-10 DIAGNOSIS — K56.609 UNSPECIFIED INTESTINAL OBSTRUCTION, UNSPECIFIED AS TO PARTIAL VERSUS COMPLETE OBSTRUCTION: ICD-10-CM

## 2024-09-10 DIAGNOSIS — Z87.19 PERSONAL HISTORY OF OTHER DISEASES OF THE DIGESTIVE SYSTEM: ICD-10-CM

## 2024-09-12 ENCOUNTER — APPOINTMENT (OUTPATIENT)
Dept: RADIOLOGY | Facility: IMAGING CENTER | Age: 69
End: 2024-09-12
Payer: COMMERCIAL

## 2024-09-12 ENCOUNTER — OUTPATIENT (OUTPATIENT)
Dept: OUTPATIENT SERVICES | Facility: HOSPITAL | Age: 69
LOS: 1 days | End: 2024-09-12
Payer: COMMERCIAL

## 2024-09-12 ENCOUNTER — TRANSCRIPTION ENCOUNTER (OUTPATIENT)
Age: 69
End: 2024-09-12

## 2024-09-12 DIAGNOSIS — Z98.890 OTHER SPECIFIED POSTPROCEDURAL STATES: Chronic | ICD-10-CM

## 2024-09-12 DIAGNOSIS — K56.609 UNSPECIFIED INTESTINAL OBSTRUCTION, UNSPECIFIED AS TO PARTIAL VERSUS COMPLETE OBSTRUCTION: ICD-10-CM

## 2024-09-12 PROCEDURE — 74019 RADEX ABDOMEN 2 VIEWS: CPT | Mod: 26

## 2024-09-13 ENCOUNTER — APPOINTMENT (OUTPATIENT)
Dept: SURGERY | Facility: HOSPITAL | Age: 69
End: 2024-09-13

## 2024-09-13 ENCOUNTER — RESULT REVIEW (OUTPATIENT)
Age: 69
End: 2024-09-13

## 2024-09-13 ENCOUNTER — INPATIENT (INPATIENT)
Facility: HOSPITAL | Age: 69
LOS: 0 days | Discharge: HOME | DRG: 390 | End: 2024-09-14
Payer: MEDICARE

## 2024-09-13 VITALS
WEIGHT: 158.95 LBS | HEART RATE: 95 BPM | SYSTOLIC BLOOD PRESSURE: 122 MMHG | HEIGHT: 62 IN | OXYGEN SATURATION: 96 % | DIASTOLIC BLOOD PRESSURE: 81 MMHG | RESPIRATION RATE: 16 BRPM | TEMPERATURE: 98 F

## 2024-09-13 DIAGNOSIS — Z98.890 OTHER SPECIFIED POSTPROCEDURAL STATES: Chronic | ICD-10-CM

## 2024-09-13 DIAGNOSIS — K56.609 UNSPECIFIED INTESTINAL OBSTRUCTION, UNSPECIFIED AS TO PARTIAL VERSUS COMPLETE OBSTRUCTION: ICD-10-CM

## 2024-09-13 LAB — GLUCOSE BLDC GLUCOMTR-MCNC: 158 MG/DL — HIGH (ref 70–99)

## 2024-09-13 PROCEDURE — 88342 IMHCHEM/IMCYTCHM 1ST ANTB: CPT | Mod: 26,59

## 2024-09-13 PROCEDURE — 44020 EXPLORE SMALL INTESTINE: CPT | Mod: 52

## 2024-09-13 PROCEDURE — 88309 TISSUE EXAM BY PATHOLOGIST: CPT | Mod: 26

## 2024-09-13 PROCEDURE — 88341 IMHCHEM/IMCYTCHM EA ADD ANTB: CPT | Mod: 26,59

## 2024-09-13 PROCEDURE — 88360 TUMOR IMMUNOHISTOCHEM/MANUAL: CPT | Mod: 26

## 2024-09-13 PROCEDURE — 44202 LAP ENTERECTOMY: CPT

## 2024-09-13 DEVICE — STAPLER COVIDIEN GIA 80-3.0MM PURPLE RELOAD: Type: IMPLANTABLE DEVICE | Status: FUNCTIONAL

## 2024-09-13 DEVICE — STAPLER COVIDIEN GIA 80-3.0MM PURPLE: Type: IMPLANTABLE DEVICE | Status: FUNCTIONAL

## 2024-09-13 RX ORDER — LACTULOSE 10 G
15 PACKET (EA) ORAL
Refills: 0 | DISCHARGE

## 2024-09-13 RX ORDER — CELECOXIB 50 MG/1
400 CAPSULE ORAL ONCE
Refills: 0 | Status: COMPLETED | OUTPATIENT
Start: 2024-09-13 | End: 2024-09-13

## 2024-09-13 RX ORDER — ONDANSETRON HCL/PF 4 MG/2 ML
4 VIAL (ML) INJECTION ONCE
Refills: 0 | Status: DISCONTINUED | OUTPATIENT
Start: 2024-09-13 | End: 2024-09-13

## 2024-09-13 RX ORDER — ACETAMINOPHEN 500 MG/5ML
1000 LIQUID (ML) ORAL EVERY 6 HOURS
Refills: 0 | Status: COMPLETED | OUTPATIENT
Start: 2024-09-13 | End: 2024-09-14

## 2024-09-13 RX ORDER — HYDROMORPHONE/SOD CHLOR,ISO/PF 2 MG/10 ML
0.5 SYRINGE (ML) INJECTION
Refills: 0 | Status: DISCONTINUED | OUTPATIENT
Start: 2024-09-13 | End: 2024-09-13

## 2024-09-13 RX ORDER — LIDOCAINE HCL/PF 10 MG/ML
0.2 VIAL (ML) INJECTION ONCE
Refills: 0 | Status: DISCONTINUED | OUTPATIENT
Start: 2024-09-13 | End: 2024-09-13

## 2024-09-13 RX ORDER — L.ACID,PARA/B.BIFIDUM/S.THERM 8B CELL
1 CAPSULE ORAL
Refills: 0 | DISCHARGE

## 2024-09-13 RX ORDER — KETOROLAC TROMETHAMINE 30 MG/ML
15 INJECTION, SOLUTION INTRAMUSCULAR; INTRAVENOUS EVERY 6 HOURS
Refills: 0 | Status: DISCONTINUED | OUTPATIENT
Start: 2024-09-13 | End: 2024-09-14

## 2024-09-13 RX ORDER — LIPOSOMAL UBIQUINOL 80 MG/10ML
1 AMPUL (ML) ORAL
Refills: 0 | DISCHARGE

## 2024-09-13 RX ORDER — OXYCODONE HYDROCHLORIDE 30 MG/1
2.5 TABLET ORAL EVERY 4 HOURS
Refills: 0 | Status: DISCONTINUED | OUTPATIENT
Start: 2024-09-13 | End: 2024-09-14

## 2024-09-13 RX ORDER — OXYCODONE HYDROCHLORIDE 30 MG/1
5 TABLET ORAL EVERY 4 HOURS
Refills: 0 | Status: DISCONTINUED | OUTPATIENT
Start: 2024-09-13 | End: 2024-09-14

## 2024-09-13 RX ORDER — FLUTICASONE PROPIONATE 50 UG/1
1 SPRAY, METERED NASAL
Refills: 0 | DISCHARGE

## 2024-09-13 RX ORDER — SODIUM CHLORIDE 9 G/1000ML
1000 INJECTION, SOLUTION INTRAVENOUS
Refills: 0 | Status: DISCONTINUED | OUTPATIENT
Start: 2024-09-13 | End: 2024-09-14

## 2024-09-13 RX ORDER — INFLUENZA A VIRUS A/IDAHO/07/2018 (H1N1) ANTIGEN (MDCK CELL DERIVED, PROPIOLACTONE INACTIVATED, INFLUENZA A VIRUS A/INDIANA/08/2018 (H3N2) ANTIGEN (MDCK CELL DERIVED, PROPIOLACTONE INACTIVATED), INFLUENZA B VIRUS B/SINGAPORE/INFTT-16-0610/2016 ANTIGEN (MDCK CELL DERIVED, PROPIOLACTONE INACTIVATED), INFLUENZA B VIRUS B/IOWA/06/2017 ANTIGEN (MDCK CELL DERIVED, PROPIOLACTONE INACTIVATED) 15; 15; 15; 15 UG/.5ML; UG/.5ML; UG/.5ML; UG/.5ML
0.5 INJECTION, SUSPENSION INTRAMUSCULAR ONCE
Refills: 0 | Status: DISCONTINUED | OUTPATIENT
Start: 2024-09-13 | End: 2024-09-14

## 2024-09-13 RX ADMIN — KETOROLAC TROMETHAMINE 15 MILLIGRAM(S): 30 INJECTION, SOLUTION INTRAMUSCULAR; INTRAVENOUS at 12:06

## 2024-09-13 RX ADMIN — KETOROLAC TROMETHAMINE 15 MILLIGRAM(S): 30 INJECTION, SOLUTION INTRAMUSCULAR; INTRAVENOUS at 12:36

## 2024-09-13 RX ADMIN — Medication 1000 MILLIGRAM(S): at 20:30

## 2024-09-13 RX ADMIN — Medication 0.5 MILLIGRAM(S): at 11:35

## 2024-09-13 RX ADMIN — KETOROLAC TROMETHAMINE 15 MILLIGRAM(S): 30 INJECTION, SOLUTION INTRAMUSCULAR; INTRAVENOUS at 17:31

## 2024-09-13 RX ADMIN — Medication 400 MILLIGRAM(S): at 20:00

## 2024-09-13 RX ADMIN — KETOROLAC TROMETHAMINE 15 MILLIGRAM(S): 30 INJECTION, SOLUTION INTRAMUSCULAR; INTRAVENOUS at 23:31

## 2024-09-13 RX ADMIN — Medication 0.5 MILLIGRAM(S): at 11:05

## 2024-09-13 RX ADMIN — Medication 0.5 MILLIGRAM(S): at 10:50

## 2024-09-13 RX ADMIN — CELECOXIB 400 MILLIGRAM(S): 50 CAPSULE ORAL at 06:09

## 2024-09-13 RX ADMIN — Medication 0.5 MILLIGRAM(S): at 11:10

## 2024-09-13 RX ADMIN — Medication 0.5 MILLIGRAM(S): at 11:25

## 2024-09-13 RX ADMIN — Medication 400 MILLIGRAM(S): at 15:15

## 2024-09-13 RX ADMIN — Medication 1000 MILLIGRAM(S): at 15:45

## 2024-09-13 RX ADMIN — SODIUM CHLORIDE 40 MILLILITER(S): 9 INJECTION, SOLUTION INTRAVENOUS at 10:51

## 2024-09-13 RX ADMIN — KETOROLAC TROMETHAMINE 15 MILLIGRAM(S): 30 INJECTION, SOLUTION INTRAMUSCULAR; INTRAVENOUS at 23:01

## 2024-09-13 RX ADMIN — Medication 0.5 MILLIGRAM(S): at 11:50

## 2024-09-14 ENCOUNTER — TRANSCRIPTION ENCOUNTER (OUTPATIENT)
Age: 69
End: 2024-09-14

## 2024-09-14 VITALS
SYSTOLIC BLOOD PRESSURE: 115 MMHG | RESPIRATION RATE: 18 BRPM | DIASTOLIC BLOOD PRESSURE: 78 MMHG | OXYGEN SATURATION: 96 % | HEART RATE: 84 BPM | TEMPERATURE: 98 F

## 2024-09-14 DIAGNOSIS — K56.699 OTHER INTESTINAL OBSTRUCTION UNSPECIFIED AS TO PARTIAL VERSUS COMPLETE OBSTRUCTION: ICD-10-CM

## 2024-09-14 LAB
ANION GAP SERPL CALC-SCNC: 13 MMOL/L — SIGNIFICANT CHANGE UP (ref 5–17)
BUN SERPL-MCNC: 12 MG/DL — SIGNIFICANT CHANGE UP (ref 7–23)
CALCIUM SERPL-MCNC: 9.5 MG/DL — SIGNIFICANT CHANGE UP (ref 8.4–10.5)
CHLORIDE SERPL-SCNC: 108 MMOL/L — SIGNIFICANT CHANGE UP (ref 96–108)
CO2 SERPL-SCNC: 20 MMOL/L — LOW (ref 22–31)
CREAT SERPL-MCNC: 0.7 MG/DL — SIGNIFICANT CHANGE UP (ref 0.5–1.3)
EGFR: 94 ML/MIN/1.73M2 — SIGNIFICANT CHANGE UP
EGFR: 94 ML/MIN/1.73M2 — SIGNIFICANT CHANGE UP
GLUCOSE SERPL-MCNC: 102 MG/DL — HIGH (ref 70–99)
HCT VFR BLD CALC: 38.2 % — SIGNIFICANT CHANGE UP (ref 34.5–45)
HGB BLD-MCNC: 12.3 G/DL — SIGNIFICANT CHANGE UP (ref 11.5–15.5)
MAGNESIUM SERPL-MCNC: 2.3 MG/DL — SIGNIFICANT CHANGE UP (ref 1.6–2.6)
MCHC RBC-ENTMCNC: 31.3 PG — SIGNIFICANT CHANGE UP (ref 27–34)
MCHC RBC-ENTMCNC: 32.2 GM/DL — SIGNIFICANT CHANGE UP (ref 32–36)
MCV RBC AUTO: 97.2 FL — SIGNIFICANT CHANGE UP (ref 80–100)
NRBC # BLD: 0 /100 WBCS — SIGNIFICANT CHANGE UP (ref 0–0)
NRBC BLD-RTO: 0 /100 WBCS — SIGNIFICANT CHANGE UP (ref 0–0)
PHOSPHATE SERPL-MCNC: 3.3 MG/DL — SIGNIFICANT CHANGE UP (ref 2.5–4.5)
PLATELET # BLD AUTO: 282 K/UL — SIGNIFICANT CHANGE UP (ref 150–400)
POTASSIUM SERPL-MCNC: 5 MMOL/L — SIGNIFICANT CHANGE UP (ref 3.5–5.3)
POTASSIUM SERPL-SCNC: 5 MMOL/L — SIGNIFICANT CHANGE UP (ref 3.5–5.3)
RBC # BLD: 3.93 M/UL — SIGNIFICANT CHANGE UP (ref 3.8–5.2)
RBC # FLD: 11.9 % — SIGNIFICANT CHANGE UP (ref 10.3–14.5)
SODIUM SERPL-SCNC: 141 MMOL/L — SIGNIFICANT CHANGE UP (ref 135–145)
WBC # BLD: 15.88 K/UL — HIGH (ref 3.8–10.5)
WBC # FLD AUTO: 15.88 K/UL — HIGH (ref 3.8–10.5)

## 2024-09-14 RX ORDER — OXYCODONE HYDROCHLORIDE 30 MG/1
1 TABLET ORAL
Qty: 6 | Refills: 0
Start: 2024-09-14

## 2024-09-14 RX ORDER — ACETAMINOPHEN 500 MG/5ML
650 LIQUID (ML) ORAL EVERY 6 HOURS
Refills: 0 | Status: DISCONTINUED | OUTPATIENT
Start: 2024-09-14 | End: 2024-09-14

## 2024-09-14 RX ADMIN — Medication 1000 MILLIGRAM(S): at 09:02

## 2024-09-14 RX ADMIN — KETOROLAC TROMETHAMINE 15 MILLIGRAM(S): 30 INJECTION, SOLUTION INTRAMUSCULAR; INTRAVENOUS at 05:40

## 2024-09-14 RX ADMIN — Medication 400 MILLIGRAM(S): at 02:28

## 2024-09-14 RX ADMIN — Medication 1000 MILLIGRAM(S): at 02:58

## 2024-09-14 RX ADMIN — Medication 650 MILLIGRAM(S): at 14:27

## 2024-09-14 RX ADMIN — KETOROLAC TROMETHAMINE 15 MILLIGRAM(S): 30 INJECTION, SOLUTION INTRAMUSCULAR; INTRAVENOUS at 05:10

## 2024-09-14 RX ADMIN — Medication 400 MILLIGRAM(S): at 08:02

## 2024-09-20 LAB — SURGICAL PATHOLOGY STUDY: SIGNIFICANT CHANGE UP

## 2024-09-26 ENCOUNTER — APPOINTMENT (OUTPATIENT)
Dept: SURGERY | Facility: CLINIC | Age: 69
End: 2024-09-26
Payer: MEDICARE

## 2024-09-26 VITALS
RESPIRATION RATE: 17 BRPM | DIASTOLIC BLOOD PRESSURE: 76 MMHG | HEART RATE: 98 BPM | OXYGEN SATURATION: 98 % | TEMPERATURE: 97.4 F | SYSTOLIC BLOOD PRESSURE: 125 MMHG

## 2024-09-26 DIAGNOSIS — R30.0 DYSURIA: ICD-10-CM

## 2024-09-26 PROCEDURE — 99024 POSTOP FOLLOW-UP VISIT: CPT

## 2024-09-26 NOTE — ASSESSMENT
[FreeTextEntry1] : If dysuria continues patient will get urinalysis with reflex culture.  Advance diet as tolerated.  Follow-up hematology.  Follow-up with me as needed.

## 2024-09-26 NOTE — PHYSICAL EXAM
[Abdomen Masses] : No abdominal masses [Abdomen Tenderness] : ~T No ~M abdominal tenderness [de-identified] : Normal wound healing

## 2024-09-26 NOTE — PHYSICAL EXAM
[Abdomen Masses] : No abdominal masses [Abdomen Tenderness] : ~T No ~M abdominal tenderness [de-identified] : Normal wound healing

## 2024-09-26 NOTE — HISTORY OF PRESENT ILLNESS
[FreeTextEntry1] : Emily is a 70 y/o male here for a post-op visit, s/p Single incision laparoscopic small-bowel resection and removal of foreign body on 9/13/24.  Pathology - 1. Small bowel, resection - Chronic lymphocytic leukemia/Small lymphocytic lymphoma - Focal chronic active enteritis - Negative for epithelial dysplasia (see note)  Has CLL  Colonoscopy on 2/29/24 - Diverticulosis of the sigmoid colon. Normal mucosa in the terminal ileum. Grade/Stage I internal hemorrhoids. Polyp (4 mm) in the descending colon. (Polypectomy).  Today pt reports no pain. Denies drainage, bleeding, swelling, and redness of surgical incision. Denies nausea and vomiting. Denies fever and chills. Daily 1-2 times BM, formed. denies constipation or diarrhea. Fair appetite, low fiber diet.

## 2024-09-26 NOTE — HISTORY OF PRESENT ILLNESS
[FreeTextEntry1] : Emily is a 68 y/o male here for a post-op visit, s/p Single incision laparoscopic small-bowel resection and removal of foreign body on 9/13/24.  Pathology - 1. Small bowel, resection - Chronic lymphocytic leukemia/Small lymphocytic lymphoma - Focal chronic active enteritis - Negative for epithelial dysplasia (see note)  Has CLL  Colonoscopy on 2/29/24 - Diverticulosis of the sigmoid colon. Normal mucosa in the terminal ileum. Grade/Stage I internal hemorrhoids. Polyp (4 mm) in the descending colon. (Polypectomy).  Today pt reports no pain. Denies drainage, bleeding, swelling, and redness of surgical incision. Denies nausea and vomiting. Denies fever and chills. Daily 1-2 times BM, formed. denies constipation or diarrhea. Fair appetite, low fiber diet.

## 2024-09-29 PROCEDURE — 74019 RADEX ABDOMEN 2 VIEWS: CPT

## 2024-09-29 PROCEDURE — 82962 GLUCOSE BLOOD TEST: CPT

## 2024-09-29 PROCEDURE — 88360 TUMOR IMMUNOHISTOCHEM/MANUAL: CPT

## 2024-09-29 PROCEDURE — 80048 BASIC METABOLIC PNL TOTAL CA: CPT

## 2024-09-29 PROCEDURE — 88309 TISSUE EXAM BY PATHOLOGIST: CPT

## 2024-09-29 PROCEDURE — 85027 COMPLETE CBC AUTOMATED: CPT

## 2024-09-29 PROCEDURE — 88342 IMHCHEM/IMCYTCHM 1ST ANTB: CPT

## 2024-09-29 PROCEDURE — 88341 IMHCHEM/IMCYTCHM EA ADD ANTB: CPT

## 2024-09-29 PROCEDURE — 84100 ASSAY OF PHOSPHORUS: CPT

## 2024-09-29 PROCEDURE — 97161 PT EVAL LOW COMPLEX 20 MIN: CPT

## 2024-09-29 PROCEDURE — 83735 ASSAY OF MAGNESIUM: CPT

## 2024-09-29 PROCEDURE — C9399: CPT

## 2024-09-29 PROCEDURE — C1889: CPT

## 2024-10-04 DIAGNOSIS — C91.10 CHRONIC LYMPHOCYTIC LEUKEMIA OF B-CELL TYPE NOT HAVING ACHIEVED REMISSION: ICD-10-CM

## 2024-10-09 ENCOUNTER — OUTPATIENT (OUTPATIENT)
Dept: OUTPATIENT SERVICES | Facility: HOSPITAL | Age: 69
LOS: 1 days | Discharge: HOME | End: 2024-10-09

## 2024-10-09 DIAGNOSIS — Z98.890 OTHER SPECIFIED POSTPROCEDURAL STATES: Chronic | ICD-10-CM

## 2024-10-09 DIAGNOSIS — C91.10 CHRONIC LYMPHOCYTIC LEUKEMIA OF B-CELL TYPE NOT HAVING ACHIEVED REMISSION: ICD-10-CM

## 2024-10-11 ENCOUNTER — APPOINTMENT (OUTPATIENT)
Dept: CARDIOLOGY | Facility: CLINIC | Age: 69
End: 2024-10-11
Payer: MEDICARE

## 2024-10-11 ENCOUNTER — NON-APPOINTMENT (OUTPATIENT)
Age: 69
End: 2024-10-11

## 2024-10-11 VITALS
OXYGEN SATURATION: 97 % | DIASTOLIC BLOOD PRESSURE: 70 MMHG | HEART RATE: 72 BPM | WEIGHT: 158 LBS | SYSTOLIC BLOOD PRESSURE: 120 MMHG | HEIGHT: 62 IN | BODY MASS INDEX: 29.08 KG/M2

## 2024-10-11 DIAGNOSIS — I34.1 NONRHEUMATIC MITRAL (VALVE) PROLAPSE: ICD-10-CM

## 2024-10-11 DIAGNOSIS — R00.2 PALPITATIONS: ICD-10-CM

## 2024-10-11 DIAGNOSIS — E78.00 PURE HYPERCHOLESTEROLEMIA, UNSPECIFIED: ICD-10-CM

## 2024-10-11 DIAGNOSIS — I51.89 OTHER ILL-DEFINED HEART DISEASES: ICD-10-CM

## 2024-10-11 PROCEDURE — 93000 ELECTROCARDIOGRAM COMPLETE: CPT

## 2024-10-11 PROCEDURE — G2211 COMPLEX E/M VISIT ADD ON: CPT

## 2024-10-11 PROCEDURE — 99214 OFFICE O/P EST MOD 30 MIN: CPT

## 2024-10-16 ENCOUNTER — APPOINTMENT (OUTPATIENT)
Dept: ORTHOPEDIC SURGERY | Facility: CLINIC | Age: 69
End: 2024-10-16
Payer: MEDICARE

## 2024-10-16 VITALS — BODY MASS INDEX: 29.08 KG/M2 | HEIGHT: 62 IN | WEIGHT: 158 LBS

## 2024-10-16 DIAGNOSIS — M76.822 POSTERIOR TIBIAL TENDINITIS, LEFT LEG: ICD-10-CM

## 2024-10-16 PROCEDURE — 99214 OFFICE O/P EST MOD 30 MIN: CPT

## 2024-10-16 PROCEDURE — 73610 X-RAY EXAM OF ANKLE: CPT | Mod: LT

## 2024-10-21 ENCOUNTER — RESULT REVIEW (OUTPATIENT)
Age: 69
End: 2024-10-21

## 2024-10-21 ENCOUNTER — APPOINTMENT (OUTPATIENT)
Dept: HEMATOLOGY ONCOLOGY | Facility: CLINIC | Age: 69
End: 2024-10-21
Payer: MEDICARE

## 2024-10-21 VITALS
RESPIRATION RATE: 16 BRPM | DIASTOLIC BLOOD PRESSURE: 71 MMHG | HEART RATE: 79 BPM | SYSTOLIC BLOOD PRESSURE: 119 MMHG | BODY MASS INDEX: 28.91 KG/M2 | TEMPERATURE: 97.6 F | OXYGEN SATURATION: 97 % | WEIGHT: 158.07 LBS

## 2024-10-21 LAB
BASOPHILS # BLD AUTO: 0.05 K/UL — SIGNIFICANT CHANGE UP (ref 0–0.2)
BASOPHILS NFR BLD AUTO: 0.5 % — SIGNIFICANT CHANGE UP (ref 0–2)
EOSINOPHIL # BLD AUTO: 0.13 K/UL — SIGNIFICANT CHANGE UP (ref 0–0.5)
EOSINOPHIL NFR BLD AUTO: 1.2 % — SIGNIFICANT CHANGE UP (ref 0–6)
HCT VFR BLD CALC: 38.6 % — SIGNIFICANT CHANGE UP (ref 34.5–45)
HGB BLD-MCNC: 12.2 G/DL — SIGNIFICANT CHANGE UP (ref 11.5–15.5)
IMM GRANULOCYTES NFR BLD AUTO: 0.4 % — SIGNIFICANT CHANGE UP (ref 0–0.9)
LYMPHOCYTES # BLD AUTO: 4.74 K/UL — HIGH (ref 1–3.3)
LYMPHOCYTES # BLD AUTO: 44.9 % — HIGH (ref 13–44)
MCHC RBC-ENTMCNC: 30.9 PG — SIGNIFICANT CHANGE UP (ref 27–34)
MCHC RBC-ENTMCNC: 31.6 G/DL — LOW (ref 32–36)
MCV RBC AUTO: 97.7 FL — SIGNIFICANT CHANGE UP (ref 80–100)
MONOCYTES # BLD AUTO: 0.85 K/UL — SIGNIFICANT CHANGE UP (ref 0–0.9)
MONOCYTES NFR BLD AUTO: 8 % — SIGNIFICANT CHANGE UP (ref 2–14)
NEUTROPHILS # BLD AUTO: 4.75 K/UL — SIGNIFICANT CHANGE UP (ref 1.8–7.4)
NEUTROPHILS NFR BLD AUTO: 45 % — SIGNIFICANT CHANGE UP (ref 43–77)
NRBC # BLD: 0 /100 WBCS — SIGNIFICANT CHANGE UP (ref 0–0)
NRBC BLD-RTO: 0 /100 WBCS — SIGNIFICANT CHANGE UP (ref 0–0)
PLATELET # BLD AUTO: 276 K/UL — SIGNIFICANT CHANGE UP (ref 150–400)
RBC # BLD: 3.95 M/UL — SIGNIFICANT CHANGE UP (ref 3.8–5.2)
RBC # FLD: 12.3 % — SIGNIFICANT CHANGE UP (ref 10.3–14.5)
WBC # BLD: 10.56 K/UL — HIGH (ref 3.8–10.5)
WBC # FLD AUTO: 10.56 K/UL — HIGH (ref 3.8–10.5)

## 2024-10-21 PROCEDURE — 99215 OFFICE O/P EST HI 40 MIN: CPT

## 2024-10-21 PROCEDURE — G2211 COMPLEX E/M VISIT ADD ON: CPT

## 2024-10-21 RX ORDER — GLUCOSAMINE HCL 500 MG
100 TABLET ORAL
Refills: 0 | Status: ACTIVE | COMMUNITY
Start: 2024-10-21

## 2024-10-29 ENCOUNTER — NON-APPOINTMENT (OUTPATIENT)
Age: 69
End: 2024-10-29

## 2024-11-06 ENCOUNTER — APPOINTMENT (OUTPATIENT)
Dept: INTERNAL MEDICINE | Facility: CLINIC | Age: 69
End: 2024-11-06
Payer: MEDICARE

## 2024-11-06 VITALS
HEART RATE: 82 BPM | DIASTOLIC BLOOD PRESSURE: 65 MMHG | OXYGEN SATURATION: 98 % | BODY MASS INDEX: 29.26 KG/M2 | TEMPERATURE: 97.7 F | HEIGHT: 62 IN | WEIGHT: 159 LBS | SYSTOLIC BLOOD PRESSURE: 118 MMHG

## 2024-11-06 DIAGNOSIS — L30.4 ERYTHEMA INTERTRIGO: ICD-10-CM

## 2024-11-06 PROCEDURE — 99214 OFFICE O/P EST MOD 30 MIN: CPT

## 2024-11-06 PROCEDURE — G2211 COMPLEX E/M VISIT ADD ON: CPT

## 2024-11-06 RX ORDER — CLOTRIMAZOLE 10 MG/G
1 CREAM TOPICAL 3 TIMES DAILY
Qty: 1 | Refills: 0 | Status: ACTIVE | COMMUNITY
Start: 2024-11-06 | End: 1900-01-01

## 2024-11-13 ENCOUNTER — APPOINTMENT (OUTPATIENT)
Dept: ORTHOPEDIC SURGERY | Facility: CLINIC | Age: 69
End: 2024-11-13
Payer: MEDICARE

## 2024-11-13 DIAGNOSIS — M17.12 UNILATERAL PRIMARY OSTEOARTHRITIS, LEFT KNEE: ICD-10-CM

## 2024-11-13 DIAGNOSIS — M84.452A PATHOLOGICAL FRACTURE, LEFT FEMUR, INITIAL ENCOUNTER FOR FRACTURE: ICD-10-CM

## 2024-11-13 PROCEDURE — 99214 OFFICE O/P EST MOD 30 MIN: CPT | Mod: 25

## 2024-11-13 PROCEDURE — 20610 DRAIN/INJ JOINT/BURSA W/O US: CPT | Mod: LT

## 2024-11-13 PROCEDURE — 73564 X-RAY EXAM KNEE 4 OR MORE: CPT | Mod: LT

## 2024-11-15 ENCOUNTER — LABORATORY RESULT (OUTPATIENT)
Age: 69
End: 2024-11-15

## 2024-11-15 LAB
25(OH)D3 SERPL-MCNC: 44.7 NG/ML
ALBUMIN SERPL ELPH-MCNC: 4.5 G/DL
ALP BLD-CCNC: 102 U/L
ALT SERPL-CCNC: 17 U/L
ANION GAP SERPL CALC-SCNC: 15 MMOL/L
APPEARANCE: CLEAR
AST SERPL-CCNC: 17 U/L
BACTERIA: NEGATIVE /HPF
BASOPHILS # BLD AUTO: 0.03 K/UL
BASOPHILS NFR BLD AUTO: 0.2 %
BILIRUB SERPL-MCNC: 0.4 MG/DL
BILIRUBIN URINE: NEGATIVE
BLOOD URINE: NEGATIVE
BUN SERPL-MCNC: 23 MG/DL
CALCIUM SERPL-MCNC: 9.8 MG/DL
CAST: 0 /LPF
CHLORIDE SERPL-SCNC: 105 MMOL/L
CHOLEST SERPL-MCNC: 165 MG/DL
CO2 SERPL-SCNC: 23 MMOL/L
COLOR: YELLOW
CREAT SERPL-MCNC: 0.76 MG/DL
EGFR: 85 ML/MIN/1.73M2
EOSINOPHIL # BLD AUTO: 0.01 K/UL
EOSINOPHIL NFR BLD AUTO: 0.1 %
EPITHELIAL CELLS: 1 /HPF
ESTIMATED AVERAGE GLUCOSE: 117 MG/DL
GLUCOSE QUALITATIVE U: NEGATIVE MG/DL
GLUCOSE SERPL-MCNC: 83 MG/DL
HBA1C MFR BLD HPLC: 5.7 %
HCT VFR BLD CALC: 38.9 %
HDLC SERPL-MCNC: 66 MG/DL
HGB BLD-MCNC: 12.3 G/DL
IMM GRANULOCYTES NFR BLD AUTO: 0.6 %
KETONES URINE: NEGATIVE MG/DL
LDLC SERPL CALC-MCNC: 80 MG/DL
LEUKOCYTE ESTERASE URINE: ABNORMAL
LYMPHOCYTES # BLD AUTO: 4.12 K/UL
LYMPHOCYTES NFR BLD AUTO: 26.1 %
MAN DIFF?: NORMAL
MCHC RBC-ENTMCNC: 29.9 PG
MCHC RBC-ENTMCNC: 31.6 G/DL
MCV RBC AUTO: 94.6 FL
MICROSCOPIC-UA: NORMAL
MONOCYTES # BLD AUTO: 1.05 K/UL
MONOCYTES NFR BLD AUTO: 6.6 %
NEUTROPHILS # BLD AUTO: 10.51 K/UL
NEUTROPHILS NFR BLD AUTO: 66.4 %
NITRITE URINE: NEGATIVE
NONHDLC SERPL-MCNC: 99 MG/DL
PH URINE: 6
PLATELET # BLD AUTO: 335 K/UL
POTASSIUM SERPL-SCNC: 4.2 MMOL/L
PROT SERPL-MCNC: 7.1 G/DL
PROTEIN URINE: NEGATIVE MG/DL
RBC # BLD: 4.11 M/UL
RBC # FLD: 12.4 %
RED BLOOD CELLS URINE: 3 /HPF
SODIUM SERPL-SCNC: 142 MMOL/L
SPECIFIC GRAVITY URINE: 1.02
T4 FREE SERPL-MCNC: 1.2 NG/DL
TRIGL SERPL-MCNC: 107 MG/DL
TSH SERPL-ACNC: 1.95 UIU/ML
UROBILINOGEN URINE: 0.2 MG/DL
VIT B12 SERPL-MCNC: 778 PG/ML
WBC # FLD AUTO: 15.81 K/UL
WHITE BLOOD CELLS URINE: 1 /HPF

## 2024-11-22 ENCOUNTER — OUTPATIENT (OUTPATIENT)
Dept: OUTPATIENT SERVICES | Facility: HOSPITAL | Age: 69
LOS: 1 days | End: 2024-11-22
Payer: MEDICARE

## 2024-11-22 ENCOUNTER — APPOINTMENT (OUTPATIENT)
Dept: MRI IMAGING | Facility: IMAGING CENTER | Age: 69
End: 2024-11-22

## 2024-11-22 DIAGNOSIS — Z98.890 OTHER SPECIFIED POSTPROCEDURAL STATES: Chronic | ICD-10-CM

## 2024-11-22 DIAGNOSIS — M17.12 UNILATERAL PRIMARY OSTEOARTHRITIS, LEFT KNEE: ICD-10-CM

## 2024-11-22 PROCEDURE — 73721 MRI JNT OF LWR EXTRE W/O DYE: CPT

## 2024-11-22 PROCEDURE — 73721 MRI JNT OF LWR EXTRE W/O DYE: CPT | Mod: 26,LT,MH

## 2024-11-27 ENCOUNTER — APPOINTMENT (OUTPATIENT)
Dept: INTERNAL MEDICINE | Facility: CLINIC | Age: 69
End: 2024-11-27
Payer: MEDICARE

## 2024-11-27 VITALS
HEART RATE: 95 BPM | HEIGHT: 62 IN | WEIGHT: 159 LBS | RESPIRATION RATE: 17 BRPM | DIASTOLIC BLOOD PRESSURE: 82 MMHG | TEMPERATURE: 97.8 F | SYSTOLIC BLOOD PRESSURE: 112 MMHG | OXYGEN SATURATION: 95 % | BODY MASS INDEX: 29.26 KG/M2

## 2024-11-27 DIAGNOSIS — C91.10 CHRONIC LYMPHOCYTIC LEUKEMIA OF B-CELL TYPE NOT HAVING ACHIEVED REMISSION: ICD-10-CM

## 2024-11-27 DIAGNOSIS — E78.00 PURE HYPERCHOLESTEROLEMIA, UNSPECIFIED: ICD-10-CM

## 2024-11-27 DIAGNOSIS — E66.9 OBESITY, UNSPECIFIED: ICD-10-CM

## 2024-11-27 DIAGNOSIS — K56.609 UNSPECIFIED INTESTINAL OBSTRUCTION, UNSPECIFIED AS TO PARTIAL VERSUS COMPLETE OBSTRUCTION: ICD-10-CM

## 2024-11-27 DIAGNOSIS — Z00.00 ENCOUNTER FOR GENERAL ADULT MEDICAL EXAMINATION W/OUT ABNORMAL FINDINGS: ICD-10-CM

## 2024-11-27 PROCEDURE — G0439: CPT

## 2024-12-02 ENCOUNTER — NON-APPOINTMENT (OUTPATIENT)
Age: 69
End: 2024-12-02

## 2024-12-13 ENCOUNTER — APPOINTMENT (OUTPATIENT)
Dept: ORTHOPEDIC SURGERY | Facility: CLINIC | Age: 69
End: 2024-12-13
Payer: MEDICARE

## 2024-12-13 VITALS — HEIGHT: 62 IN | BODY MASS INDEX: 29.26 KG/M2 | WEIGHT: 159 LBS

## 2024-12-13 DIAGNOSIS — M17.12 UNILATERAL PRIMARY OSTEOARTHRITIS, LEFT KNEE: ICD-10-CM

## 2024-12-13 PROCEDURE — 20610 DRAIN/INJ JOINT/BURSA W/O US: CPT | Mod: LT

## 2024-12-13 PROCEDURE — 99213 OFFICE O/P EST LOW 20 MIN: CPT | Mod: 25

## 2025-01-08 ENCOUNTER — APPOINTMENT (OUTPATIENT)
Dept: OPHTHALMOLOGY | Facility: CLINIC | Age: 70
End: 2025-01-08
Payer: MEDICARE

## 2025-01-08 ENCOUNTER — NON-APPOINTMENT (OUTPATIENT)
Age: 70
End: 2025-01-08

## 2025-01-08 PROCEDURE — 92133 CPTRZD OPH DX IMG PST SGM ON: CPT

## 2025-01-08 PROCEDURE — 92014 COMPRE OPH EXAM EST PT 1/>: CPT

## 2025-01-24 ENCOUNTER — APPOINTMENT (OUTPATIENT)
Dept: OPHTHALMOLOGY | Facility: CLINIC | Age: 70
End: 2025-01-24
Payer: SELF-PAY

## 2025-01-24 ENCOUNTER — NON-APPOINTMENT (OUTPATIENT)
Age: 70
End: 2025-01-24

## 2025-01-24 PROCEDURE — 11200 RMVL SKIN TAGS UP TO&INC 15: CPT

## 2025-02-07 ENCOUNTER — APPOINTMENT (OUTPATIENT)
Dept: OPHTHALMOLOGY | Facility: CLINIC | Age: 70
End: 2025-02-07

## 2025-02-20 DIAGNOSIS — C91.10 CHRONIC LYMPHOCYTIC LEUKEMIA OF B-CELL TYPE NOT HAVING ACHIEVED REMISSION: ICD-10-CM

## 2025-03-07 ENCOUNTER — OUTPATIENT (OUTPATIENT)
Dept: OUTPATIENT SERVICES | Facility: HOSPITAL | Age: 70
LOS: 1 days | Discharge: HOME | End: 2025-03-07

## 2025-03-07 DIAGNOSIS — Z98.890 OTHER SPECIFIED POSTPROCEDURAL STATES: Chronic | ICD-10-CM

## 2025-03-07 DIAGNOSIS — C91.10 CHRONIC LYMPHOCYTIC LEUKEMIA OF B-CELL TYPE NOT HAVING ACHIEVED REMISSION: ICD-10-CM

## 2025-03-10 ENCOUNTER — RESULT REVIEW (OUTPATIENT)
Age: 70
End: 2025-03-10

## 2025-03-10 ENCOUNTER — APPOINTMENT (OUTPATIENT)
Dept: HEMATOLOGY ONCOLOGY | Facility: CLINIC | Age: 70
End: 2025-03-10

## 2025-03-10 ENCOUNTER — APPOINTMENT (OUTPATIENT)
Dept: HEMATOLOGY ONCOLOGY | Facility: CLINIC | Age: 70
End: 2025-03-10
Payer: MEDICARE

## 2025-03-10 VITALS
BODY MASS INDEX: 28.23 KG/M2 | OXYGEN SATURATION: 98 % | DIASTOLIC BLOOD PRESSURE: 79 MMHG | HEART RATE: 79 BPM | TEMPERATURE: 97.2 F | WEIGHT: 154.32 LBS | SYSTOLIC BLOOD PRESSURE: 122 MMHG | RESPIRATION RATE: 16 BRPM

## 2025-03-10 DIAGNOSIS — C91.10 CHRONIC LYMPHOCYTIC LEUKEMIA OF B-CELL TYPE NOT HAVING ACHIEVED REMISSION: ICD-10-CM

## 2025-03-10 LAB
BASOPHILS # BLD AUTO: 0.05 K/UL — SIGNIFICANT CHANGE UP (ref 0–0.2)
BASOPHILS NFR BLD AUTO: 0.6 % — SIGNIFICANT CHANGE UP (ref 0–2)
EOSINOPHIL # BLD AUTO: 0.11 K/UL — SIGNIFICANT CHANGE UP (ref 0–0.5)
EOSINOPHIL NFR BLD AUTO: 1.3 % — SIGNIFICANT CHANGE UP (ref 0–6)
HCT VFR BLD CALC: 41.2 % — SIGNIFICANT CHANGE UP (ref 34.5–45)
HGB BLD-MCNC: 12.8 G/DL — SIGNIFICANT CHANGE UP (ref 11.5–15.5)
IMM GRANULOCYTES NFR BLD AUTO: 0.2 % — SIGNIFICANT CHANGE UP (ref 0–0.9)
LYMPHOCYTES # BLD AUTO: 3.48 K/UL — HIGH (ref 1–3.3)
LYMPHOCYTES # BLD AUTO: 42.5 % — SIGNIFICANT CHANGE UP (ref 13–44)
MCHC RBC-ENTMCNC: 29 PG — SIGNIFICANT CHANGE UP (ref 27–34)
MCHC RBC-ENTMCNC: 31.1 G/DL — LOW (ref 32–36)
MCV RBC AUTO: 93.2 FL — SIGNIFICANT CHANGE UP (ref 80–100)
MONOCYTES # BLD AUTO: 0.78 K/UL — SIGNIFICANT CHANGE UP (ref 0–0.9)
MONOCYTES NFR BLD AUTO: 9.5 % — SIGNIFICANT CHANGE UP (ref 2–14)
NEUTROPHILS # BLD AUTO: 3.75 K/UL — SIGNIFICANT CHANGE UP (ref 1.8–7.4)
NEUTROPHILS NFR BLD AUTO: 45.9 % — SIGNIFICANT CHANGE UP (ref 43–77)
NRBC BLD AUTO-RTO: 0 /100 WBCS — SIGNIFICANT CHANGE UP (ref 0–0)
PLATELET # BLD AUTO: 262 K/UL — SIGNIFICANT CHANGE UP (ref 150–400)
RBC # BLD: 4.42 M/UL — SIGNIFICANT CHANGE UP (ref 3.8–5.2)
RBC # FLD: 13.4 % — SIGNIFICANT CHANGE UP (ref 10.3–14.5)
WBC # BLD: 8.19 K/UL — SIGNIFICANT CHANGE UP (ref 3.8–10.5)
WBC # FLD AUTO: 8.19 K/UL — SIGNIFICANT CHANGE UP (ref 3.8–10.5)

## 2025-03-10 PROCEDURE — 99212 OFFICE O/P EST SF 10 MIN: CPT

## 2025-03-11 NOTE — ED PROVIDER NOTE - RESPIRATORY, MLM
Pt presents to ED complaining of intermittent suprapubic and abdominal cramping x2 weeks, intensifying over the last week. Pt had a partial hysterectomy on 3/3/25. Pt stated she spoke with her OB and was informed to come to the the ER. Pt denies any bleeding at her incision sites or vaginally. Pt report feeling pain/ pressure when her bladder becomes full. Pt denies any pain while urinating. Pts last BM was 3/2/25. Pt has a hx of a gastric sleeve and reports typically only having a BM 1 to 2x per week.  Pt reports taking dilaudid this morning around 1 AM this morning. Pt reports N/V from taking dilaudid, but states she has zofran to help with those symptoms. Pt is alert and oriented x 4, RR even and unlabored, skin is warm and dry. Pt appears in NAD at this time. Assessment completed and pt updated on plan of care.  Call bell in reach.  Emergency Department Nursing Plan of Care  The Nursing Plan of Care is developed from the Nursing assessment and Emergency Department Attending provider initial evaluation.  The plan of care may be reviewed in the “ED Provider note”.  The Plan of Care was developed with the following considerations:  Patient / Family readiness to learn indicated by:Refer to Medical chart in [facility]  Persons(s) to be included in education: Refer to Medical chart in [facility]  Barriers to Learning/Limitations:Normal      Signed    Mikala Guerrero RN    3/11/2025   12:31 PM    Breath sounds clear and equal bilaterally.

## 2025-04-04 ENCOUNTER — APPOINTMENT (OUTPATIENT)
Dept: CARDIOLOGY | Facility: CLINIC | Age: 70
End: 2025-04-04
Payer: MEDICARE

## 2025-04-04 ENCOUNTER — NON-APPOINTMENT (OUTPATIENT)
Age: 70
End: 2025-04-04

## 2025-04-04 VITALS
HEART RATE: 91 BPM | BODY MASS INDEX: 27.98 KG/M2 | DIASTOLIC BLOOD PRESSURE: 68 MMHG | SYSTOLIC BLOOD PRESSURE: 102 MMHG | OXYGEN SATURATION: 97 % | WEIGHT: 153 LBS

## 2025-04-04 DIAGNOSIS — I34.1 NONRHEUMATIC MITRAL (VALVE) PROLAPSE: ICD-10-CM

## 2025-04-04 DIAGNOSIS — R00.2 PALPITATIONS: ICD-10-CM

## 2025-04-04 DIAGNOSIS — E78.00 PURE HYPERCHOLESTEROLEMIA, UNSPECIFIED: ICD-10-CM

## 2025-04-04 DIAGNOSIS — I51.89 OTHER ILL-DEFINED HEART DISEASES: ICD-10-CM

## 2025-04-04 PROCEDURE — G2211 COMPLEX E/M VISIT ADD ON: CPT

## 2025-04-04 PROCEDURE — 99214 OFFICE O/P EST MOD 30 MIN: CPT

## 2025-04-04 PROCEDURE — 93000 ELECTROCARDIOGRAM COMPLETE: CPT

## 2025-04-10 ENCOUNTER — TRANSCRIPTION ENCOUNTER (OUTPATIENT)
Age: 70
End: 2025-04-10

## 2025-04-30 ENCOUNTER — RX RENEWAL (OUTPATIENT)
Age: 70
End: 2025-04-30

## 2025-05-06 ENCOUNTER — NON-APPOINTMENT (OUTPATIENT)
Age: 70
End: 2025-05-06

## 2025-05-07 ENCOUNTER — APPOINTMENT (OUTPATIENT)
Dept: ORTHOPEDIC SURGERY | Facility: CLINIC | Age: 70
End: 2025-05-07
Payer: MEDICARE

## 2025-05-07 VITALS — BODY MASS INDEX: 28.16 KG/M2 | HEIGHT: 62 IN | WEIGHT: 153 LBS

## 2025-05-07 DIAGNOSIS — M17.12 UNILATERAL PRIMARY OSTEOARTHRITIS, LEFT KNEE: ICD-10-CM

## 2025-05-07 PROCEDURE — 99213 OFFICE O/P EST LOW 20 MIN: CPT

## 2025-05-15 ENCOUNTER — NON-APPOINTMENT (OUTPATIENT)
Age: 70
End: 2025-05-15

## 2025-05-27 ENCOUNTER — APPOINTMENT (OUTPATIENT)
Dept: INTERNAL MEDICINE | Facility: CLINIC | Age: 70
End: 2025-05-27
Payer: MEDICARE

## 2025-05-27 VITALS
DIASTOLIC BLOOD PRESSURE: 68 MMHG | TEMPERATURE: 97.2 F | BODY MASS INDEX: 29.64 KG/M2 | OXYGEN SATURATION: 95 % | SYSTOLIC BLOOD PRESSURE: 122 MMHG | HEART RATE: 89 BPM | HEIGHT: 60 IN | WEIGHT: 151 LBS

## 2025-05-27 DIAGNOSIS — R73.03 PREDIABETES.: ICD-10-CM

## 2025-05-27 DIAGNOSIS — C91.10 CHRONIC LYMPHOCYTIC LEUKEMIA OF B-CELL TYPE NOT HAVING ACHIEVED REMISSION: ICD-10-CM

## 2025-05-27 DIAGNOSIS — E78.00 PURE HYPERCHOLESTEROLEMIA, UNSPECIFIED: ICD-10-CM

## 2025-05-27 PROCEDURE — 99214 OFFICE O/P EST MOD 30 MIN: CPT

## 2025-05-27 PROCEDURE — G2211 COMPLEX E/M VISIT ADD ON: CPT

## 2025-06-27 ENCOUNTER — APPOINTMENT (OUTPATIENT)
Dept: ORTHOPEDIC SURGERY | Facility: CLINIC | Age: 70
End: 2025-06-27
Payer: MEDICARE

## 2025-06-27 VITALS — HEIGHT: 60 IN | BODY MASS INDEX: 29.64 KG/M2 | WEIGHT: 151 LBS

## 2025-06-27 PROCEDURE — 20610 DRAIN/INJ JOINT/BURSA W/O US: CPT | Mod: LT

## 2025-06-27 PROCEDURE — 99213 OFFICE O/P EST LOW 20 MIN: CPT | Mod: 25

## 2025-07-02 ENCOUNTER — APPOINTMENT (OUTPATIENT)
Dept: INTERNAL MEDICINE | Facility: CLINIC | Age: 70
End: 2025-07-02
Payer: MEDICARE

## 2025-07-02 ENCOUNTER — OUTPATIENT (OUTPATIENT)
Dept: OUTPATIENT SERVICES | Facility: HOSPITAL | Age: 70
LOS: 1 days | End: 2025-07-02
Payer: MEDICARE

## 2025-07-02 ENCOUNTER — APPOINTMENT (OUTPATIENT)
Dept: ULTRASOUND IMAGING | Facility: CLINIC | Age: 70
End: 2025-07-02
Payer: MEDICARE

## 2025-07-02 VITALS
SYSTOLIC BLOOD PRESSURE: 110 MMHG | TEMPERATURE: 98.7 F | DIASTOLIC BLOOD PRESSURE: 70 MMHG | WEIGHT: 149.44 LBS | OXYGEN SATURATION: 95 % | RESPIRATION RATE: 16 BRPM | BODY MASS INDEX: 28.95 KG/M2 | HEART RATE: 85 BPM | HEIGHT: 60.05 IN

## 2025-07-02 DIAGNOSIS — Z98.890 OTHER SPECIFIED POSTPROCEDURAL STATES: Chronic | ICD-10-CM

## 2025-07-02 DIAGNOSIS — R10.11 RIGHT UPPER QUADRANT PAIN: ICD-10-CM

## 2025-07-02 PROBLEM — R10.9 ABDOMINAL DISCOMFORT: Status: ACTIVE | Noted: 2025-07-02

## 2025-07-02 PROCEDURE — 36415 COLL VENOUS BLD VENIPUNCTURE: CPT

## 2025-07-02 PROCEDURE — 99214 OFFICE O/P EST MOD 30 MIN: CPT

## 2025-07-02 PROCEDURE — 76700 US EXAM ABDOM COMPLETE: CPT | Mod: 26

## 2025-07-02 PROCEDURE — 76700 US EXAM ABDOM COMPLETE: CPT

## 2025-07-02 PROCEDURE — G2211 COMPLEX E/M VISIT ADD ON: CPT

## 2025-07-02 RX ORDER — ONDANSETRON 4 MG/1
4 TABLET, ORALLY DISINTEGRATING ORAL
Qty: 1 | Refills: 0 | Status: ACTIVE | COMMUNITY
Start: 2025-07-02 | End: 1900-01-01

## 2025-07-03 ENCOUNTER — TRANSCRIPTION ENCOUNTER (OUTPATIENT)
Age: 70
End: 2025-07-03

## 2025-07-03 LAB
ALBUMIN SERPL ELPH-MCNC: 4.6 G/DL
ALP BLD-CCNC: 107 U/L
ALT SERPL-CCNC: 24 U/L
AMYLASE/CREAT SERPL: 119 U/L
ANION GAP SERPL CALC-SCNC: 14 MMOL/L
AST SERPL-CCNC: 20 U/L
BASOPHILS # BLD AUTO: 0.06 K/UL
BASOPHILS NFR BLD AUTO: 0.6 %
BILIRUB SERPL-MCNC: 0.4 MG/DL
BUN SERPL-MCNC: 20 MG/DL
CALCIUM SERPL-MCNC: 9.8 MG/DL
CHLORIDE SERPL-SCNC: 106 MMOL/L
CO2 SERPL-SCNC: 21 MMOL/L
CREAT SERPL-MCNC: 0.67 MG/DL
EGFRCR SERPLBLD CKD-EPI 2021: 95 ML/MIN/1.73M2
EOSINOPHIL # BLD AUTO: 0.09 K/UL
EOSINOPHIL NFR BLD AUTO: 0.9 %
GLUCOSE SERPL-MCNC: 90 MG/DL
HCT VFR BLD CALC: 41.6 %
HGB BLD-MCNC: 12.7 G/DL
IMM GRANULOCYTES NFR BLD AUTO: 0.2 %
LPL SERPL-CCNC: 61 U/L
LYMPHOCYTES # BLD AUTO: 3.77 K/UL
LYMPHOCYTES NFR BLD AUTO: 36.5 %
MAN DIFF?: NORMAL
MCHC RBC-ENTMCNC: 29.6 PG
MCHC RBC-ENTMCNC: 30.5 G/DL
MCV RBC AUTO: 97 FL
MONOCYTES # BLD AUTO: 0.78 K/UL
MONOCYTES NFR BLD AUTO: 7.5 %
NEUTROPHILS # BLD AUTO: 5.62 K/UL
NEUTROPHILS NFR BLD AUTO: 54.3 %
PLATELET # BLD AUTO: 287 K/UL
POTASSIUM SERPL-SCNC: 4.5 MMOL/L
PROT SERPL-MCNC: 7.2 G/DL
RBC # BLD: 4.29 M/UL
RBC # FLD: 13.9 %
SODIUM SERPL-SCNC: 141 MMOL/L
WBC # FLD AUTO: 10.34 K/UL

## 2025-07-07 ENCOUNTER — APPOINTMENT (OUTPATIENT)
Dept: HEMATOLOGY ONCOLOGY | Facility: CLINIC | Age: 70
End: 2025-07-07

## 2025-07-09 ENCOUNTER — APPOINTMENT (OUTPATIENT)
Dept: OPHTHALMOLOGY | Facility: CLINIC | Age: 70
End: 2025-07-09
Payer: MEDICARE

## 2025-07-09 ENCOUNTER — NON-APPOINTMENT (OUTPATIENT)
Age: 70
End: 2025-07-09

## 2025-07-09 PROCEDURE — 92014 COMPRE OPH EXAM EST PT 1/>: CPT

## 2025-07-16 ENCOUNTER — APPOINTMENT (OUTPATIENT)
Dept: CARDIOLOGY | Facility: CLINIC | Age: 70
End: 2025-07-16

## 2025-07-16 ENCOUNTER — OUTPATIENT (OUTPATIENT)
Dept: OUTPATIENT SERVICES | Facility: HOSPITAL | Age: 70
LOS: 1 days | End: 2025-07-16
Payer: MEDICARE

## 2025-07-16 DIAGNOSIS — Z98.890 OTHER SPECIFIED POSTPROCEDURAL STATES: Chronic | ICD-10-CM

## 2025-07-16 DIAGNOSIS — Z00.00 ENCOUNTER FOR GENERAL ADULT MEDICAL EXAMINATION WITHOUT ABNORMAL FINDINGS: ICD-10-CM

## 2025-07-16 DIAGNOSIS — E78.00 PURE HYPERCHOLESTEROLEMIA, UNSPECIFIED: ICD-10-CM

## 2025-07-16 DIAGNOSIS — I51.89 OTHER ILL-DEFINED HEART DISEASES: ICD-10-CM

## 2025-07-16 PROCEDURE — 75574 CT ANGIO HRT W/3D IMAGE: CPT | Mod: 26

## 2025-07-16 PROCEDURE — 75574 CT ANGIO HRT W/3D IMAGE: CPT

## 2025-08-18 ENCOUNTER — APPOINTMENT (OUTPATIENT)
Age: 70
End: 2025-08-18
Payer: MEDICARE

## 2025-08-18 ENCOUNTER — NON-APPOINTMENT (OUTPATIENT)
Age: 70
End: 2025-08-18

## 2025-08-18 PROCEDURE — 92012 INTRM OPH EXAM EST PATIENT: CPT

## (undated) DEVICE — Device

## (undated) DEVICE — FOLEY TRAY 16FR 5CC LTX UMETER CLOSED

## (undated) DEVICE — KIT ENDO PROCEDURE CUST W/VLV

## (undated) DEVICE — POSITIONER FOAM EGG CRATE ULNAR 2PCS (PINK)

## (undated) DEVICE — CONTAINER FORMALIN BUFF 10% 60ML

## (undated) DEVICE — DRAPE GENERAL ENDOSCOPY

## (undated) DEVICE — PACK MAJOR ABDOMINAL SUPINE

## (undated) DEVICE — STAPLER COVIDIEN ENDO GIA STANDARD HANDLE

## (undated) DEVICE — ENDOCUFF VISION SZ 2 LG GRN

## (undated) DEVICE — TUBING STRYKEFLOW II SUCTION / IRRIGATOR

## (undated) DEVICE — SUT MAXON 1 36" GS-24

## (undated) DEVICE — POLY TRAP ETRAP

## (undated) DEVICE — SOL IRR POUR NS 0.9% 500ML

## (undated) DEVICE — PREP CHLORAPREP HI-LITE ORANGE 26ML

## (undated) DEVICE — GELPOINT ADVANCED

## (undated) DEVICE — DRSG STERISTRIPS 0.5 X 4"

## (undated) DEVICE — SUT CAPROSYN 4-0 30" P-12 UNDYED

## (undated) DEVICE — PLATE NESSY 170

## (undated) DEVICE — LIGASURE BLUNT TIP 5MM X 37CM

## (undated) DEVICE — SUT SOFSILK 3-0 30" V-20

## (undated) DEVICE — SUT SOFSILK 2-0 18" V-20 (POP-OFF)

## (undated) DEVICE — TUBING CAP SET ERBEFLO CLEVERCAP HYBRID CO2 FOR OLYMPUS SCOPES AND UCR

## (undated) DEVICE — BLADE SCALPEL SAFETYLOCK #10

## (undated) DEVICE — DRSG OPSITE 13.75 X 4"

## (undated) DEVICE — DRAPE MAYO STAND 30"

## (undated) DEVICE — GOWN TRIMAX LG

## (undated) DEVICE — TROCAR COVIDIEN VERSASTEP PLUS 11MM STANDARD

## (undated) DEVICE — VENODYNE/SCD SLEEVE CALF MEDIUM

## (undated) DEVICE — TROCAR COVIDIEN BLUNT TIP HASSAN 10MM

## (undated) DEVICE — SNARE EXACTO COLD 9MMX230CM

## (undated) DEVICE — SOL IRR POUR H2O 1000ML

## (undated) DEVICE — DRAPE 1/2 SHEET 40X57"

## (undated) DEVICE — TROCAR COVIDIEN VERSASTEP PLUS 12MM STANDARD

## (undated) DEVICE — SOL IRR POUR H2O 250ML

## (undated) DEVICE — SUT POLYSORB 1 60" TIES

## (undated) DEVICE — SNARE POLYP SENS SM 13MM 240CM

## (undated) DEVICE — STAPLER SKIN VISI-STAT 35 WIDE

## (undated) DEVICE — GELPORT LAPAROSCOPIC SYSTEM

## (undated) DEVICE — SUT SOFSILK 3-0 18" V-20 (POP-OFF)

## (undated) DEVICE — INSUFFLATION NDL COVIDIEN STEP 14G FOR STEP/VERSASTEP

## (undated) DEVICE — BLADE SCALPEL SAFETYLOCK #15

## (undated) DEVICE — PACK COLON BUNDLE

## (undated) DEVICE — WARMING BLANKET UPPER ADULT

## (undated) DEVICE — SUT SOFSILK 2-0 30" V-20

## (undated) DEVICE — DRSG TEGADERM 6 X 8"

## (undated) DEVICE — SHEARS COVIDIEN ENDO SHEAR 5MM X 31CM W UNIPOLAR CAUTERY

## (undated) DEVICE — FORCEP RADIAL JAW 4 240CM DISP

## (undated) DEVICE — SPECIMEN CONTAINER 100ML

## (undated) DEVICE — TUBING INSUFFLATION LAP FILTER 10FT

## (undated) DEVICE — ELCTR BOVIE PENCIL SMOKE EVACUATION

## (undated) DEVICE — DRAIN PENROSE .25" X 18" LATEX

## (undated) DEVICE — DRAPE TOWEL BLUE 17" X 24"

## (undated) DEVICE — LIGASURE IMPACT